# Patient Record
Sex: MALE | Race: WHITE | NOT HISPANIC OR LATINO | Employment: OTHER | URBAN - METROPOLITAN AREA
[De-identification: names, ages, dates, MRNs, and addresses within clinical notes are randomized per-mention and may not be internally consistent; named-entity substitution may affect disease eponyms.]

---

## 2023-02-06 ENCOUNTER — HOSPITAL ENCOUNTER (EMERGENCY)
Dept: DATA CONVERSION | Facility: HOSPITAL | Age: 88
Discharge: HOME | End: 2023-02-06
Attending: STUDENT IN AN ORGANIZED HEALTH CARE EDUCATION/TRAINING PROGRAM

## 2023-02-06 DIAGNOSIS — E78.5 HYPERLIPIDEMIA, UNSPECIFIED: ICD-10-CM

## 2023-02-06 DIAGNOSIS — R42 DIZZINESS AND GIDDINESS: ICD-10-CM
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
## 2023-02-06 DIAGNOSIS — M25.511 PAIN IN RIGHT SHOULDER: ICD-10-CM

## 2023-02-06 DIAGNOSIS — R51.9 HEADACHE, UNSPECIFIED: ICD-10-CM

## 2023-02-06 DIAGNOSIS — G89.29 OTHER CHRONIC PAIN: ICD-10-CM

## 2023-02-06 DIAGNOSIS — R94.31 ABNORMAL ELECTROCARDIOGRAM (ECG) (EKG): ICD-10-CM

## 2023-02-06 DIAGNOSIS — N40.0 BENIGN PROSTATIC HYPERPLASIA WITHOUT LOWER URINARY TRACT SYMPTOMS: ICD-10-CM

## 2023-02-06 DIAGNOSIS — R55 SYNCOPE AND COLLAPSE: ICD-10-CM

## 2023-02-06 DIAGNOSIS — M48.02 SPINAL STENOSIS, CERVICAL REGION: ICD-10-CM

## 2023-02-06 DIAGNOSIS — F41.9 ANXIETY DISORDER, UNSPECIFIED: ICD-10-CM

## 2023-02-06 DIAGNOSIS — I10 ESSENTIAL (PRIMARY) HYPERTENSION: ICD-10-CM

## 2023-02-06 DIAGNOSIS — F32.A DEPRESSION, UNSPECIFIED: ICD-10-CM

## 2023-02-06 LAB
BASOPHILS (10*3/UL) IN BLOOD BY AUTOMATED COUNT: 0.02 X10E9/L (ref 0–0.1)
BASOPHILS/100 LEUKOCYTES IN BLOOD BY AUTOMATED COUNT: 0.6 % (ref 0–2)
EOSINOPHILS (10*3/UL) IN BLOOD BY AUTOMATED COUNT: 0.1 X10E9/L (ref 0–0.4)
EOSINOPHILS/100 LEUKOCYTES IN BLOOD BY AUTOMATED COUNT: 3 % (ref 0–6)
ERYTHROCYTE DISTRIBUTION WIDTH (RATIO) BY AUTOMATED COUNT: 13.1 % (ref 11.5–14.5)
ERYTHROCYTE MEAN CORPUSCULAR HEMOGLOBIN CONCENTRATION (G/DL) BY AUTOMATED: 32.9 G/DL (ref 32–36)
ERYTHROCYTE MEAN CORPUSCULAR VOLUME (FL) BY AUTOMATED COUNT: 95 FL (ref 80–100)
ERYTHROCYTES (10*6/UL) IN BLOOD BY AUTOMATED COUNT: 3.83 X10E12/L (ref 4.5–5.9)
HEMATOCRIT (%) IN BLOOD BY AUTOMATED COUNT: 36.5 % (ref 41–52)
HEMOGLOBIN (G/DL) IN BLOOD: 12 G/DL (ref 13.5–17.5)
IMMATURE GRANULOCYTES/100 LEUKOCYTES IN BLOOD BY AUTOMATED COUNT: 0.3 % (ref 0–0.9)
LEUKOCYTES (10*3/UL) IN BLOOD BY AUTOMATED COUNT: 3.3 X10E9/L (ref 4.4–11.3)
LYMPHOCYTES (10*3/UL) IN BLOOD BY AUTOMATED COUNT: 0.89 X10E9/L (ref 0.8–3)
LYMPHOCYTES/100 LEUKOCYTES IN BLOOD BY AUTOMATED COUNT: 26.9 % (ref 13–44)
MONOCYTES (10*3/UL) IN BLOOD BY AUTOMATED COUNT: 0.35 X10E9/L (ref 0.05–0.8)
MONOCYTES/100 LEUKOCYTES IN BLOOD BY AUTOMATED COUNT: 10.6 % (ref 2–10)
NEUTROPHILS (10*3/UL) IN BLOOD BY AUTOMATED COUNT: 1.94 X10E9/L (ref 1.6–5.5)
NEUTROPHILS/100 LEUKOCYTES IN BLOOD BY AUTOMATED COUNT: 58.6 % (ref 40–80)
NRBC (PER 100 WBCS) BY AUTOMATED COUNT: 0 /100 WBC (ref 0–0)
PLATELETS (10*3/UL) IN BLOOD AUTOMATED COUNT: 144 X10E9/L (ref 150–450)
TROPONIN I, HIGH SENSITIVITY: 6 NG/L (ref 0–20)

## 2023-04-26 LAB
ALANINE AMINOTRANSFERASE (SGPT) (U/L) IN SER/PLAS: 12 U/L (ref 10–52)
ALBUMIN (G/DL) IN SER/PLAS: 4.1 G/DL (ref 3.4–5)
ALKALINE PHOSPHATASE (U/L) IN SER/PLAS: 44 U/L (ref 33–136)
ANION GAP IN SER/PLAS: 11 MMOL/L (ref 10–20)
ASPARTATE AMINOTRANSFERASE (SGOT) (U/L) IN SER/PLAS: 14 U/L (ref 9–39)
BASOPHILS (10*3/UL) IN BLOOD BY AUTOMATED COUNT: 0.03 X10E9/L (ref 0–0.1)
BASOPHILS/100 LEUKOCYTES IN BLOOD BY AUTOMATED COUNT: 0.9 % (ref 0–2)
BILIRUBIN DIRECT (MG/DL) IN SER/PLAS: 0.1 MG/DL (ref 0–0.3)
BILIRUBIN TOTAL (MG/DL) IN SER/PLAS: 1.5 MG/DL (ref 0–1.2)
CALCIDIOL (25 OH VITAMIN D3) (NG/ML) IN SER/PLAS: 53 NG/ML
CALCIUM (MG/DL) IN SER/PLAS: 9.4 MG/DL (ref 8.6–10.3)
CARBON DIOXIDE, TOTAL (MMOL/L) IN SER/PLAS: 31 MMOL/L (ref 21–32)
CHLORIDE (MMOL/L) IN SER/PLAS: 106 MMOL/L (ref 98–107)
CHOLESTEROL (MG/DL) IN SER/PLAS: 141 MG/DL (ref 0–199)
CHOLESTEROL IN HDL (MG/DL) IN SER/PLAS: 71.7 MG/DL
CHOLESTEROL/HDL RATIO: 2
COBALAMIN (VITAMIN B12) (PG/ML) IN SER/PLAS: 544 PG/ML (ref 211–911)
CREATININE (MG/DL) IN SER/PLAS: 0.8 MG/DL (ref 0.5–1.3)
EOSINOPHILS (10*3/UL) IN BLOOD BY AUTOMATED COUNT: 0.09 X10E9/L (ref 0–0.4)
EOSINOPHILS/100 LEUKOCYTES IN BLOOD BY AUTOMATED COUNT: 2.6 % (ref 0–6)
ERYTHROCYTE DISTRIBUTION WIDTH (RATIO) BY AUTOMATED COUNT: 12.7 % (ref 11.5–14.5)
ERYTHROCYTE MEAN CORPUSCULAR HEMOGLOBIN CONCENTRATION (G/DL) BY AUTOMATED: 32 G/DL (ref 32–36)
ERYTHROCYTE MEAN CORPUSCULAR VOLUME (FL) BY AUTOMATED COUNT: 98 FL (ref 80–100)
ERYTHROCYTES (10*6/UL) IN BLOOD BY AUTOMATED COUNT: 4.2 X10E12/L (ref 4.5–5.9)
GFR MALE: 86 ML/MIN/1.73M2
GLUCOSE (MG/DL) IN SER/PLAS: 88 MG/DL (ref 74–99)
HEMATOCRIT (%) IN BLOOD BY AUTOMATED COUNT: 41.3 % (ref 41–52)
HEMOGLOBIN (G/DL) IN BLOOD: 13.2 G/DL (ref 13.5–17.5)
IMMATURE GRANULOCYTES/100 LEUKOCYTES IN BLOOD BY AUTOMATED COUNT: 0 % (ref 0–0.9)
IRON (UG/DL) IN SER/PLAS: 100 UG/DL (ref 35–150)
IRON BINDING CAPACITY (UG/DL) IN SER/PLAS: 247 UG/DL (ref 240–445)
IRON SATURATION (%) IN SER/PLAS: 40 % (ref 25–45)
LDL: 61 MG/DL (ref 0–99)
LEUKOCYTES (10*3/UL) IN BLOOD BY AUTOMATED COUNT: 3.5 X10E9/L (ref 4.4–11.3)
LYMPHOCYTES (10*3/UL) IN BLOOD BY AUTOMATED COUNT: 0.95 X10E9/L (ref 0.8–3)
LYMPHOCYTES/100 LEUKOCYTES IN BLOOD BY AUTOMATED COUNT: 27.5 % (ref 13–44)
MONOCYTES (10*3/UL) IN BLOOD BY AUTOMATED COUNT: 0.34 X10E9/L (ref 0.05–0.8)
MONOCYTES/100 LEUKOCYTES IN BLOOD BY AUTOMATED COUNT: 9.8 % (ref 2–10)
NEUTROPHILS (10*3/UL) IN BLOOD BY AUTOMATED COUNT: 2.05 X10E9/L (ref 1.6–5.5)
NEUTROPHILS/100 LEUKOCYTES IN BLOOD BY AUTOMATED COUNT: 59.2 % (ref 40–80)
NRBC (PER 100 WBCS) BY AUTOMATED COUNT: 0 /100 WBC (ref 0–0)
PLATELETS (10*3/UL) IN BLOOD AUTOMATED COUNT: 152 X10E9/L (ref 150–450)
POTASSIUM (MMOL/L) IN SER/PLAS: 4.2 MMOL/L (ref 3.5–5.3)
PROTEIN TOTAL: 6.9 G/DL (ref 6.4–8.2)
SODIUM (MMOL/L) IN SER/PLAS: 144 MMOL/L (ref 136–145)
THYROTROPIN (MIU/L) IN SER/PLAS BY DETECTION LIMIT <= 0.05 MIU/L: 2.46 MIU/L (ref 0.44–3.98)
TRIGLYCERIDE (MG/DL) IN SER/PLAS: 42 MG/DL (ref 0–149)
UREA NITROGEN (MG/DL) IN SER/PLAS: 29 MG/DL (ref 6–23)
VLDL: 8 MG/DL (ref 0–40)

## 2023-09-12 LAB — PROSTATE SPECIFIC AG (NG/ML) IN SER/PLAS: 1.49 NG/ML (ref 0–4)

## 2023-12-18 ENCOUNTER — HOSPITAL ENCOUNTER (INPATIENT)
Facility: HOSPITAL | Age: 88
LOS: 4 days | Discharge: NURSING, REHAB OR LONG-TERM CARE | DRG: 535 | End: 2023-12-22
Attending: EMERGENCY MEDICINE | Admitting: INTERNAL MEDICINE
Payer: MEDICARE

## 2023-12-18 ENCOUNTER — APPOINTMENT (OUTPATIENT)
Dept: RADIOLOGY | Facility: HOSPITAL | Age: 88
DRG: 535 | End: 2023-12-18
Payer: MEDICARE

## 2023-12-18 DIAGNOSIS — S09.90XA INJURY OF HEAD, INITIAL ENCOUNTER: ICD-10-CM

## 2023-12-18 DIAGNOSIS — S32.592A: ICD-10-CM
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
## 2023-12-18 LAB
ABO GROUP (TYPE) IN BLOOD: NORMAL
ALBUMIN SERPL BCP-MCNC: 4.1 G/DL (ref 3.4–5)
ALP SERPL-CCNC: 61 U/L (ref 33–136)
ALT SERPL W P-5'-P-CCNC: 14 U/L (ref 10–52)
ANION GAP SERPL CALC-SCNC: 9 MMOL/L (ref 10–20)
ANTIBODY SCREEN: NORMAL
AST SERPL W P-5'-P-CCNC: 19 U/L (ref 9–39)
BASOPHILS # BLD AUTO: 0.03 X10*3/UL (ref 0–0.1)
BASOPHILS NFR BLD AUTO: 0.7 %
BILIRUB SERPL-MCNC: 1.1 MG/DL (ref 0–1.2)
BUN SERPL-MCNC: 20 MG/DL (ref 6–23)
CALCIUM SERPL-MCNC: 9.1 MG/DL (ref 8.6–10.3)
CHLORIDE SERPL-SCNC: 103 MMOL/L (ref 98–107)
CHOLEST SERPL-MCNC: 150 MG/DL (ref 0–199)
CHOLESTEROL/HDL RATIO: 2
CK SERPL-CCNC: 89 U/L (ref 0–325)
CO2 SERPL-SCNC: 32 MMOL/L (ref 21–32)
CREAT SERPL-MCNC: 0.83 MG/DL (ref 0.5–1.3)
EOSINOPHIL # BLD AUTO: 0.11 X10*3/UL (ref 0–0.4)
EOSINOPHIL NFR BLD AUTO: 2.7 %
ERYTHROCYTE [DISTWIDTH] IN BLOOD BY AUTOMATED COUNT: 13.6 % (ref 11.5–14.5)
GFR SERPL CREATININE-BSD FRML MDRD: 85 ML/MIN/1.73M*2
GLUCOSE BLD MANUAL STRIP-MCNC: 91 MG/DL (ref 74–99)
GLUCOSE SERPL-MCNC: 90 MG/DL (ref 74–99)
HCT VFR BLD AUTO: 37.7 % (ref 41–52)
HDLC SERPL-MCNC: 74.1 MG/DL
HGB BLD-MCNC: 12.6 G/DL (ref 13.5–17.5)
IMM GRANULOCYTES # BLD AUTO: 0.03 X10*3/UL (ref 0–0.5)
IMM GRANULOCYTES NFR BLD AUTO: 0.7 % (ref 0–0.9)
INR PPP: 1.2 (ref 0.9–1.1)
LACTATE SERPL-SCNC: 1.9 MMOL/L (ref 0.4–2)
LDLC SERPL CALC-MCNC: 48 MG/DL
LYMPHOCYTES # BLD AUTO: 1.35 X10*3/UL (ref 0.8–3)
LYMPHOCYTES NFR BLD AUTO: 33.3 %
MCH RBC QN AUTO: 32.6 PG (ref 26–34)
MCHC RBC AUTO-ENTMCNC: 33.4 G/DL (ref 32–36)
MCV RBC AUTO: 98 FL (ref 80–100)
MONOCYTES # BLD AUTO: 0.47 X10*3/UL (ref 0.05–0.8)
MONOCYTES NFR BLD AUTO: 11.6 %
NEUTROPHILS # BLD AUTO: 2.07 X10*3/UL (ref 1.6–5.5)
NEUTROPHILS NFR BLD AUTO: 51 %
NON HDL CHOLESTEROL: 76 MG/DL (ref 0–149)
NRBC BLD-RTO: 0 /100 WBCS (ref 0–0)
PLATELET # BLD AUTO: 147 X10*3/UL (ref 150–450)
POTASSIUM SERPL-SCNC: 3.7 MMOL/L (ref 3.5–5.3)
PROT SERPL-MCNC: 7 G/DL (ref 6.4–8.2)
PROTHROMBIN TIME: 13.1 SECONDS (ref 9.8–12.8)
RBC # BLD AUTO: 3.86 X10*6/UL (ref 4.5–5.9)
RH FACTOR (ANTIGEN D): NORMAL
SARS-COV-2 RNA RESP QL NAA+PROBE: DETECTED
SODIUM SERPL-SCNC: 140 MMOL/L (ref 136–145)
TRIGL SERPL-MCNC: 141 MG/DL (ref 0–149)
VLDL: 28 MG/DL (ref 0–40)
WBC # BLD AUTO: 4.1 X10*3/UL (ref 4.4–11.3)

## 2023-12-18 PROCEDURE — 86900 BLOOD TYPING SEROLOGIC ABO: CPT | Performed by: EMERGENCY MEDICINE

## 2023-12-18 PROCEDURE — G0390 TRAUMA RESPONS W/HOSP CRITI: HCPCS

## 2023-12-18 PROCEDURE — 72131 CT LUMBAR SPINE W/O DYE: CPT

## 2023-12-18 PROCEDURE — 99222 1ST HOSP IP/OBS MODERATE 55: CPT

## 2023-12-18 PROCEDURE — 72125 CT NECK SPINE W/O DYE: CPT

## 2023-12-18 PROCEDURE — 80053 COMPREHEN METABOLIC PANEL: CPT | Performed by: EMERGENCY MEDICINE

## 2023-12-18 PROCEDURE — 82947 ASSAY GLUCOSE BLOOD QUANT: CPT

## 2023-12-18 PROCEDURE — 72125 CT NECK SPINE W/O DYE: CPT | Performed by: RADIOLOGY

## 2023-12-18 PROCEDURE — 72128 CT CHEST SPINE W/O DYE: CPT

## 2023-12-18 PROCEDURE — 99222 1ST HOSP IP/OBS MODERATE 55: CPT | Performed by: SURGERY

## 2023-12-18 PROCEDURE — 83605 ASSAY OF LACTIC ACID: CPT | Performed by: EMERGENCY MEDICINE

## 2023-12-18 PROCEDURE — 36415 COLL VENOUS BLD VENIPUNCTURE: CPT | Performed by: EMERGENCY MEDICINE

## 2023-12-18 PROCEDURE — 82550 ASSAY OF CK (CPK): CPT | Performed by: EMERGENCY MEDICINE

## 2023-12-18 PROCEDURE — 99223 1ST HOSP IP/OBS HIGH 75: CPT | Performed by: INTERNAL MEDICINE

## 2023-12-18 PROCEDURE — 80061 LIPID PANEL: CPT | Performed by: INTERNAL MEDICINE

## 2023-12-18 PROCEDURE — 71260 CT THORAX DX C+: CPT

## 2023-12-18 PROCEDURE — 85610 PROTHROMBIN TIME: CPT | Performed by: EMERGENCY MEDICINE

## 2023-12-18 PROCEDURE — 70486 CT MAXILLOFACIAL W/O DYE: CPT

## 2023-12-18 PROCEDURE — 85025 COMPLETE CBC W/AUTO DIFF WBC: CPT | Performed by: EMERGENCY MEDICINE

## 2023-12-18 PROCEDURE — 99291 CRITICAL CARE FIRST HOUR: CPT | Performed by: PHYSICIAN ASSISTANT

## 2023-12-18 PROCEDURE — 1200000002 HC GENERAL ROOM WITH TELEMETRY DAILY

## 2023-12-18 PROCEDURE — 71260 CT THORAX DX C+: CPT | Performed by: RADIOLOGY

## 2023-12-18 PROCEDURE — 72128 CT CHEST SPINE W/O DYE: CPT | Performed by: RADIOLOGY

## 2023-12-18 PROCEDURE — 74177 CT ABD & PELVIS W/CONTRAST: CPT | Performed by: RADIOLOGY

## 2023-12-18 PROCEDURE — 70450 CT HEAD/BRAIN W/O DYE: CPT

## 2023-12-18 PROCEDURE — 87635 SARS-COV-2 COVID-19 AMP PRB: CPT | Performed by: PHYSICIAN ASSISTANT

## 2023-12-18 PROCEDURE — 72131 CT LUMBAR SPINE W/O DYE: CPT | Performed by: RADIOLOGY

## 2023-12-18 PROCEDURE — 2550000001 HC RX 255 CONTRASTS: Performed by: EMERGENCY MEDICINE

## 2023-12-18 RX ORDER — MORPHINE SULFATE 2 MG/ML
2 INJECTION, SOLUTION INTRAMUSCULAR; INTRAVENOUS EVERY 4 HOURS PRN
Status: DISCONTINUED | OUTPATIENT
Start: 2023-12-18 | End: 2023-12-20

## 2023-12-18 RX ORDER — ACETAMINOPHEN 325 MG/1
975 TABLET ORAL EVERY 8 HOURS
Status: DISCONTINUED | OUTPATIENT
Start: 2023-12-18 | End: 2023-12-23 | Stop reason: HOSPADM

## 2023-12-18 RX ORDER — LANOLIN ALCOHOL/MO/W.PET/CERES
400 CREAM (GRAM) TOPICAL DAILY
Status: DISCONTINUED | OUTPATIENT
Start: 2023-12-19 | End: 2023-12-23 | Stop reason: HOSPADM

## 2023-12-18 RX ORDER — LIDOCAINE 50 MG/G
1 PATCH TOPICAL DAILY
Status: DISCONTINUED | OUTPATIENT
Start: 2023-12-19 | End: 2023-12-19

## 2023-12-18 RX ORDER — GABAPENTIN 300 MG/1
300 CAPSULE ORAL 3 TIMES DAILY
Status: DISCONTINUED | OUTPATIENT
Start: 2023-12-18 | End: 2023-12-23 | Stop reason: HOSPADM

## 2023-12-18 RX ORDER — MORPHINE SULFATE 4 MG/ML
4 INJECTION, SOLUTION INTRAMUSCULAR; INTRAVENOUS EVERY 4 HOURS PRN
Status: DISCONTINUED | OUTPATIENT
Start: 2023-12-18 | End: 2023-12-20

## 2023-12-18 RX ADMIN — IOHEXOL 70 ML: 350 INJECTION, SOLUTION INTRAVENOUS at 18:24

## 2023-12-18 ASSESSMENT — COLUMBIA-SUICIDE SEVERITY RATING SCALE - C-SSRS
2. HAVE YOU ACTUALLY HAD ANY THOUGHTS OF KILLING YOURSELF?: NO
6. HAVE YOU EVER DONE ANYTHING, STARTED TO DO ANYTHING, OR PREPARED TO DO ANYTHING TO END YOUR LIFE?: NO
1. SINCE LAST CONTACT, HAVE YOU WISHED YOU WERE DEAD OR WISHED YOU COULD GO TO SLEEP AND NOT WAKE UP?: NO
6. HAVE YOU EVER DONE ANYTHING, STARTED TO DO ANYTHING, OR PREPARED TO DO ANYTHING TO END YOUR LIFE?: NO
2. HAVE YOU ACTUALLY HAD ANY THOUGHTS OF KILLING YOURSELF?: NO
1. IN THE PAST MONTH, HAVE YOU WISHED YOU WERE DEAD OR WISHED YOU COULD GO TO SLEEP AND NOT WAKE UP?: NO

## 2023-12-18 ASSESSMENT — PAIN SCALES - GENERAL: PAINLEVEL_OUTOF10: 10 - WORST POSSIBLE PAIN

## 2023-12-18 ASSESSMENT — PAIN DESCRIPTION - PROGRESSION: CLINICAL_PROGRESSION: NOT CHANGED

## 2023-12-18 ASSESSMENT — LIFESTYLE VARIABLES
EVER FELT BAD OR GUILTY ABOUT YOUR DRINKING: NO
HAVE PEOPLE ANNOYED YOU BY CRITICIZING YOUR DRINKING: NO
EVER HAD A DRINK FIRST THING IN THE MORNING TO STEADY YOUR NERVES TO GET RID OF A HANGOVER: NO
HAVE YOU EVER FELT YOU SHOULD CUT DOWN ON YOUR DRINKING: NO

## 2023-12-18 ASSESSMENT — PAIN - FUNCTIONAL ASSESSMENT: PAIN_FUNCTIONAL_ASSESSMENT: 0-10

## 2023-12-18 NOTE — ED PROVIDER NOTES
HPI   No chief complaint on file.      88-year-old male presents today for injury secondary to a fall.  History of present illness is somewhat limited.  Patient reportedly fell on 2 separate occasions today with the most recent being outside.  The patient was found on the ground outside by EMS.  It is not known how long the patient was on the ground for.  Patient was found to have an abrasion like lesion over the left eyebrow.  Patient who is alert and oriented could not provide information of what transpired.  Endorses no use of anticoagulants.  Reports pain throughout his head neck and back.  No reports of chest pain or shortness of breath.  Denies vomiting.                          No data recorded                Patient History   Past Medical History:   Diagnosis Date    Personal history of malignant neoplasm, unspecified     History of malignant neoplasm    Personal history of other diseases of the circulatory system     History of hypertension    Personal history of other diseases of the digestive system     History of diverticulitis    Personal history of other diseases of the digestive system     History of pancreatitis    Personal history of other diseases of the musculoskeletal system and connective tissue     History of arthritis    Personal history of other diseases of the musculoskeletal system and connective tissue     History of back pain    Personal history of other specified conditions     History of abdominal pain    Personal history of other specified conditions     History of headache     Past Surgical History:   Procedure Laterality Date    CATARACT EXTRACTION  09/11/2017    Cataract Surgery    CHOLECYSTECTOMY  09/11/2017    Cholecystectomy    COLONOSCOPY  09/11/2017    Colonoscopy (Fiberoptic)    OTHER SURGICAL HISTORY  09/11/2017    Biopsy Skin     No family history on file.  Social History     Tobacco Use    Smoking status: Not on file    Smokeless tobacco: Not on file   Substance Use Topics     Alcohol use: Not on file    Drug use: Not on file       Physical Exam   ED Triage Vitals   Temp Pulse Resp BP   -- -- -- --      SpO2 Temp src Heart Rate Source Patient Position   -- -- -- --      BP Location FiO2 (%)     -- --       Physical Exam  Vitals and nursing note reviewed.   Constitutional:       General: He is not in acute distress.     Appearance: Normal appearance. He is normal weight. He is not ill-appearing, toxic-appearing or diaphoretic.      Comments: GCS of 15   HENT:      Head: Normocephalic.      Comments: No evidence of basilar skull fracture or midface instability     Nose: Nose normal.      Mouth/Throat:      Mouth: Mucous membranes are moist.   Eyes:      Extraocular Movements: Extraocular movements intact.      Conjunctiva/sclera: Conjunctivae normal.      Comments: No entrapment   Neck:      Comments: There is no direct bony tenderness on examination of the midline cervical thoracic or lumbar spine.  No crepitus or step-off.  Patient does have tenderness about the paraspinous planes throughout the upper portion of the thoracic spine extending into the lumbar spine  Cardiovascular:      Rate and Rhythm: Normal rate and regular rhythm.      Pulses: Normal pulses.   Pulmonary:      Effort: Pulmonary effort is normal. No respiratory distress.      Breath sounds: Normal breath sounds.   Chest:      Chest wall: No tenderness.   Abdominal:      General: Abdomen is flat. Bowel sounds are normal. There is no distension.      Palpations: Abdomen is soft.      Tenderness: There is no abdominal tenderness. There is no guarding or rebound.   Musculoskeletal:         General: Normal range of motion.      Cervical back: Normal range of motion and neck supple.      Comments: No direct bony tenderness on examination of the extremities.  The patient is neurovascularly intact throughout.  He has full range of motion.  No pelvic instability on exam.  There is no obvious shortening or rotation of the  bilateral lower extremities.  There is no increased pain with logrolling of the bilateral hips   Skin:     General: Skin is warm and dry.      Capillary Refill: Capillary refill takes less than 2 seconds.      Comments: Abrasion to the left supraorbital region   Neurological:      General: No focal deficit present.      Mental Status: He is alert and oriented to person, place, and time.   Psychiatric:         Mood and Affect: Mood normal.         Behavior: Behavior normal.         Thought Content: Thought content normal.         Judgment: Judgment normal.         ED Course & MDM   ED Course as of 12/18/23 1928   Mon Dec 18, 2023   1746 EKG obtained.  Interpreted by me.  Sinus bradycardia.  Rate of 58.  Mild inferior depression, unchanged from prior.  No acute ischemia. [MK]   1823 Blood work is found to be consistent with previous [DS]   1824 Creatine Kinase: 89 [DS]   1824 Lactate: 1.9 [DS]   1923 IMPRESSION:  CHEST:  1.  No CT evidence of acute intrathoracic injury.  2. No CT evidence of acute thoracic spine injury.      ABDOMEN-PELVIS:  1.  Acute, comminuted fractures of the left superior inferior pubic  ramus with associated space of right sees hematoma along the left.  There is a small region of density as described above that increases  in conspicuity between the arterial and portal venous phase imaging  compatible with a small focus of active bleeding within the region  immediately superior to the left superior pubic ramus.  2. No acute osseous abnormality detected of the lumbar spine.  3. No CT evidence of acute solid organ injury.   [DS]   1923 IMPRESSION:  No CT evidence of acute intracranial injury.      No CT evidence of acute displaced facial bone fracture.   [DS]   1923 IMPRESSION:  Multilevel spondylosis without acute osseous abnormality of the  cervical spine.   [DS]   1927 There is a comminuted fracture of the left superior inferior pubic  rami. There is associated left-sided space of Retzius  hematoma with a  small region of density (series 602, image 44) on the arterial phase  exam which increases in density and conspicuity on the portal venous  phase delay (series 702 image 44) suggestive of a small focus of  active extravasation/active bleeding. This measures up to 0.6 x 0.3  cm on the arterial phase imaging and 1.4 x 0.5 cm on the portal  venous phase imaging. .   [DS]      ED Course User Index  [DS] Axel Leigh PA-C  [MK] Hilario Valle MD         Diagnoses as of 12/18/23 1928   Fall, initial encounter   Injury of head, initial encounter   Closed fracture of multiple rami of left pubis, initial encounter (CMS/[facility])       Medical Decision Making  Trauma activation was called on arrival.  History is somewhat limited however the patient reportedly fell on 2 separate occasions today.  He was found outside by EMS.  Is not known how long the patient was outside for.  I reviewed the patient's electronic medical records which revealed a past history of TIA, B12 deficiency, cervical spinal stenosis, and hyperlipidemia.  Blood work will be obtained.  Extensive imaging was ordered.  Blood work sent to be consistent with previous.  CK 89 with a lactate of 1.9.  The remainder of the lab work was found to be without concerning abnormality.  CT imaging of the head and cervical spine was ordered and found to be negative for acute traumatic injury.  CT T and L-spine were also found to be negative.  CT imaging of the chest abdomen pelvis with IV contrast revealed comminuted fracture of the left superior inferior pubic rami.  There is associated left-sided space of Retzius hematoma with a small region of density (series 602, image 44) on the arterial phase exam which increases in density and conspicuity on the portal venous phase delay (series 702 image 44) suggestive of a small focus of active extravasation/active bleeding. This measures up to 0.6 x 0.3 cm on the arterial phase imaging and 1.4 x 0.5 cm on the  portal venous phase imaging.  Case was discussed with Dr. Romero trauma service on-call who recommended admitting to the medicine service.  Case was also discussed with Dr. Orozco orthopedics on-call.  He was agreeable to the consultation.  Patient be admitted to the medicine service for further evaluation.  I suspect the patient most likely will require PT OT evaluation as well as placement.          Procedure  Critical Care    Performed by: Axel Leigh PA-C  Authorized by: Hilario Valle MD    Critical care provider statement:     Critical care time (minutes):  80    Critical care start time:  12/18/2023 6:00 PM    Critical care end time:  12/18/2023 7:20 PM    Critical care was necessary to treat or prevent imminent or life-threatening deterioration of the following conditions:  Trauma    Critical care was time spent personally by me on the following activities:  Ordering and performing treatments and interventions, ordering and review of laboratory studies, ordering and review of radiographic studies, re-evaluation of patient's condition, pulse oximetry, review of old charts, examination of patient and discussions with consultants    I assumed direction of critical care for this patient from another provider in my specialty: no      Care discussed with: admitting provider         Axel Leigh PA-C  12/18/23 1951

## 2023-12-18 NOTE — ED TRIAGE NOTES
PT BIBA FROM HOME AFTER FALLING IN [place]. PER EMS PT WAS FOUND ON GROUND NEAR MAILBOX IN SNOW. PT HIT HEAD. DENIES LOC. DENIES THINNERS. ON ARRIVAL TO ED, PT COMPLAINS OF NECK, HEAD, BACK, AND LEFT LEG PAIN. PT HAS LACERATION ABOVE L EYE ON EYEBROW. PT A&O X2 ON ARRIVAL WITH SOME GARBLED SPEECH AND INABILITY TO FIND CORRECT WORDS. PT UNABLE TO FOLLOW COMMANDS.

## 2023-12-19 ENCOUNTER — APPOINTMENT (OUTPATIENT)
Dept: RADIOLOGY | Facility: HOSPITAL | Age: 88
DRG: 535 | End: 2023-12-19
Payer: MEDICARE

## 2023-12-19 LAB
ANION GAP SERPL CALC-SCNC: 8 MMOL/L (ref 10–20)
BUN SERPL-MCNC: 17 MG/DL (ref 6–23)
CALCIUM SERPL-MCNC: 8.7 MG/DL (ref 8.6–10.3)
CHLORIDE SERPL-SCNC: 102 MMOL/L (ref 98–107)
CO2 SERPL-SCNC: 33 MMOL/L (ref 21–32)
CREAT SERPL-MCNC: 0.66 MG/DL (ref 0.5–1.3)
ERYTHROCYTE [DISTWIDTH] IN BLOOD BY AUTOMATED COUNT: 13.4 % (ref 11.5–14.5)
GFR SERPL CREATININE-BSD FRML MDRD: >90 ML/MIN/1.73M*2
GLUCOSE SERPL-MCNC: 99 MG/DL (ref 74–99)
HCT VFR BLD AUTO: 35.8 % (ref 41–52)
HCT VFR BLD AUTO: 36.4 % (ref 41–52)
HGB BLD-MCNC: 12.1 G/DL (ref 13.5–17.5)
HGB BLD-MCNC: 12.2 G/DL (ref 13.5–17.5)
MAGNESIUM SERPL-MCNC: 1.94 MG/DL (ref 1.6–2.4)
MCH RBC QN AUTO: 33.1 PG (ref 26–34)
MCHC RBC AUTO-ENTMCNC: 33.8 G/DL (ref 32–36)
MCV RBC AUTO: 98 FL (ref 80–100)
NRBC BLD-RTO: 0 /100 WBCS (ref 0–0)
PLATELET # BLD AUTO: 119 X10*3/UL (ref 150–450)
POTASSIUM SERPL-SCNC: 4.2 MMOL/L (ref 3.5–5.3)
RBC # BLD AUTO: 3.66 X10*6/UL (ref 4.5–5.9)
SODIUM SERPL-SCNC: 139 MMOL/L (ref 136–145)
WBC # BLD AUTO: 5.1 X10*3/UL (ref 4.4–11.3)

## 2023-12-19 PROCEDURE — 70544 MR ANGIOGRAPHY HEAD W/O DYE: CPT | Performed by: RADIOLOGY

## 2023-12-19 PROCEDURE — 70547 MR ANGIOGRAPHY NECK W/O DYE: CPT | Performed by: RADIOLOGY

## 2023-12-19 PROCEDURE — 2500000001 HC RX 250 WO HCPCS SELF ADMINISTERED DRUGS (ALT 637 FOR MEDICARE OP)

## 2023-12-19 PROCEDURE — 36415 COLL VENOUS BLD VENIPUNCTURE: CPT

## 2023-12-19 PROCEDURE — 80048 BASIC METABOLIC PNL TOTAL CA: CPT

## 2023-12-19 PROCEDURE — 70551 MRI BRAIN STEM W/O DYE: CPT

## 2023-12-19 PROCEDURE — 85027 COMPLETE CBC AUTOMATED: CPT

## 2023-12-19 PROCEDURE — 99232 SBSQ HOSP IP/OBS MODERATE 35: CPT | Performed by: SURGERY

## 2023-12-19 PROCEDURE — 2500000004 HC RX 250 GENERAL PHARMACY W/ HCPCS (ALT 636 FOR OP/ED): Performed by: INTERNAL MEDICINE

## 2023-12-19 PROCEDURE — 70547 MR ANGIOGRAPHY NECK W/O DYE: CPT

## 2023-12-19 PROCEDURE — 99232 SBSQ HOSP IP/OBS MODERATE 35: CPT | Performed by: INTERNAL MEDICINE

## 2023-12-19 PROCEDURE — 72141 MRI NECK SPINE W/O DYE: CPT

## 2023-12-19 PROCEDURE — 85018 HEMOGLOBIN: CPT

## 2023-12-19 PROCEDURE — 2500000005 HC RX 250 GENERAL PHARMACY W/O HCPCS

## 2023-12-19 PROCEDURE — 72141 MRI NECK SPINE W/O DYE: CPT | Performed by: RADIOLOGY

## 2023-12-19 PROCEDURE — 99231 SBSQ HOSP IP/OBS SF/LOW 25: CPT | Performed by: NURSE PRACTITIONER

## 2023-12-19 PROCEDURE — 2500000004 HC RX 250 GENERAL PHARMACY W/ HCPCS (ALT 636 FOR OP/ED)

## 2023-12-19 PROCEDURE — 70551 MRI BRAIN STEM W/O DYE: CPT | Performed by: RADIOLOGY

## 2023-12-19 PROCEDURE — 1200000002 HC GENERAL ROOM WITH TELEMETRY DAILY

## 2023-12-19 PROCEDURE — 70544 MR ANGIOGRAPHY HEAD W/O DYE: CPT

## 2023-12-19 PROCEDURE — 2500000002 HC RX 250 W HCPCS SELF ADMINISTERED DRUGS (ALT 637 FOR MEDICARE OP, ALT 636 FOR OP/ED)

## 2023-12-19 PROCEDURE — 83735 ASSAY OF MAGNESIUM: CPT

## 2023-12-19 PROCEDURE — 99223 1ST HOSP IP/OBS HIGH 75: CPT | Performed by: PSYCHIATRY & NEUROLOGY

## 2023-12-19 RX ORDER — BIOTIN 10 MG
1 TABLET ORAL
COMMUNITY

## 2023-12-19 RX ORDER — FINASTERIDE 5 MG/1
5 TABLET, FILM COATED ORAL
Status: DISCONTINUED | OUTPATIENT
Start: 2023-12-19 | End: 2023-12-23 | Stop reason: HOSPADM

## 2023-12-19 RX ORDER — ATORVASTATIN CALCIUM 20 MG/1
20 TABLET, FILM COATED ORAL
Status: DISCONTINUED | OUTPATIENT
Start: 2023-12-19 | End: 2023-12-23 | Stop reason: HOSPADM

## 2023-12-19 RX ORDER — FINASTERIDE 5 MG/1
5 TABLET, FILM COATED ORAL
COMMUNITY

## 2023-12-19 RX ORDER — ATORVASTATIN CALCIUM 20 MG/1
20 TABLET, FILM COATED ORAL
COMMUNITY
Start: 2023-05-30

## 2023-12-19 RX ORDER — FLUTICASONE PROPIONATE 50 MCG
2 SPRAY, SUSPENSION (ML) NASAL DAILY
Status: DISCONTINUED | OUTPATIENT
Start: 2023-12-19 | End: 2023-12-23 | Stop reason: HOSPADM

## 2023-12-19 RX ORDER — SERTRALINE HYDROCHLORIDE 100 MG/1
100 TABLET, FILM COATED ORAL
COMMUNITY
Start: 2022-12-21 | End: 2023-12-21

## 2023-12-19 RX ORDER — MORPHINE SULFATE 4 MG/ML
4 INJECTION, SOLUTION INTRAMUSCULAR; INTRAVENOUS ONCE
Status: DISCONTINUED | OUTPATIENT
Start: 2023-12-19 | End: 2023-12-20

## 2023-12-19 RX ORDER — ASPIRIN 81 MG/1
81 TABLET ORAL
COMMUNITY

## 2023-12-19 RX ORDER — FLUNISOLIDE 0.25 MG/ML
2 SOLUTION NASAL
COMMUNITY
Start: 2022-09-26

## 2023-12-19 RX ORDER — ONDANSETRON HYDROCHLORIDE 2 MG/ML
4 INJECTION, SOLUTION INTRAVENOUS EVERY 6 HOURS PRN
Status: DISCONTINUED | OUTPATIENT
Start: 2023-12-19 | End: 2023-12-23 | Stop reason: HOSPADM

## 2023-12-19 RX ORDER — SERTRALINE HYDROCHLORIDE 100 MG/1
100 TABLET, FILM COATED ORAL
Status: DISCONTINUED | OUTPATIENT
Start: 2023-12-19 | End: 2023-12-23 | Stop reason: HOSPADM

## 2023-12-19 RX ORDER — AMLODIPINE BESYLATE 2.5 MG/1
1 TABLET ORAL DAILY
COMMUNITY
Start: 2023-08-02 | End: 2024-07-27

## 2023-12-19 RX ORDER — ASPIRIN 81 MG/1
81 TABLET ORAL
Status: DISCONTINUED | OUTPATIENT
Start: 2023-12-19 | End: 2023-12-23 | Stop reason: HOSPADM

## 2023-12-19 RX ORDER — PANTOPRAZOLE SODIUM 40 MG/1
40 TABLET, DELAYED RELEASE ORAL
Status: DISCONTINUED | OUTPATIENT
Start: 2023-12-20 | End: 2023-12-23 | Stop reason: HOSPADM

## 2023-12-19 RX ORDER — LIDOCAINE 560 MG/1
1 PATCH PERCUTANEOUS; TOPICAL; TRANSDERMAL DAILY
Status: DISCONTINUED | OUTPATIENT
Start: 2023-12-19 | End: 2023-12-23 | Stop reason: HOSPADM

## 2023-12-19 RX ORDER — AMLODIPINE BESYLATE 5 MG/1
2.5 TABLET ORAL DAILY
Status: DISCONTINUED | OUTPATIENT
Start: 2023-12-19 | End: 2023-12-23 | Stop reason: HOSPADM

## 2023-12-19 RX ORDER — LORAZEPAM 2 MG/ML
0.5 INJECTION INTRAMUSCULAR ONCE
Status: COMPLETED | OUTPATIENT
Start: 2023-12-19 | End: 2023-12-19

## 2023-12-19 RX ADMIN — MORPHINE SULFATE 2 MG: 2 INJECTION, SOLUTION INTRAMUSCULAR; INTRAVENOUS at 13:21

## 2023-12-19 RX ADMIN — AMLODIPINE BESYLATE 2.5 MG: 5 TABLET ORAL at 13:15

## 2023-12-19 RX ADMIN — ATORVASTATIN CALCIUM 20 MG: 40 TABLET, FILM COATED ORAL at 13:15

## 2023-12-19 RX ADMIN — GABAPENTIN 300 MG: 300 CAPSULE ORAL at 21:19

## 2023-12-19 RX ADMIN — MAGNESIUM OXIDE TAB 400 MG (241.3 MG ELEMENTAL MG) 400 MG: 400 (241.3 MG) TAB at 10:49

## 2023-12-19 RX ADMIN — ACETAMINOPHEN 975 MG: 325 TABLET ORAL at 21:19

## 2023-12-19 RX ADMIN — GABAPENTIN 300 MG: 300 CAPSULE ORAL at 10:49

## 2023-12-19 RX ADMIN — MORPHINE SULFATE 2 MG: 2 INJECTION, SOLUTION INTRAMUSCULAR; INTRAVENOUS at 00:09

## 2023-12-19 RX ADMIN — ONDANSETRON 4 MG: 2 INJECTION INTRAMUSCULAR; INTRAVENOUS at 03:05

## 2023-12-19 RX ADMIN — FINASTERIDE 5 MG: 5 TABLET, FILM COATED ORAL at 15:11

## 2023-12-19 RX ADMIN — ACETAMINOPHEN 975 MG: 325 TABLET ORAL at 07:00

## 2023-12-19 RX ADMIN — LORAZEPAM 0.5 MG: 2 INJECTION INTRAMUSCULAR; INTRAVENOUS at 21:12

## 2023-12-19 RX ADMIN — ASPIRIN 81 MG: 81 TABLET, COATED ORAL at 13:15

## 2023-12-19 RX ADMIN — LIDOCAINE 1 PATCH: 4 PATCH TOPICAL at 13:21

## 2023-12-19 RX ADMIN — FLUTICASONE PROPIONATE 2 SPRAY: 50 SPRAY, METERED NASAL at 15:12

## 2023-12-19 RX ADMIN — SERTRALINE HYDROCHLORIDE 100 MG: 100 TABLET ORAL at 13:15

## 2023-12-19 RX ADMIN — ACETAMINOPHEN 975 MG: 325 TABLET ORAL at 13:15

## 2023-12-19 RX ADMIN — ACETAMINOPHEN 975 MG: 325 TABLET ORAL at 00:09

## 2023-12-19 RX ADMIN — MORPHINE SULFATE 2 MG: 2 INJECTION, SOLUTION INTRAMUSCULAR; INTRAVENOUS at 13:15

## 2023-12-19 SDOH — SOCIAL STABILITY: SOCIAL INSECURITY: WERE YOU ABLE TO COMPLETE ALL THE BEHAVIORAL HEALTH SCREENINGS?: YES

## 2023-12-19 SDOH — SOCIAL STABILITY: SOCIAL INSECURITY: HAVE YOU HAD THOUGHTS OF HARMING ANYONE ELSE?: NO

## 2023-12-19 SDOH — SOCIAL STABILITY: SOCIAL INSECURITY: ARE YOU OR HAVE YOU BEEN THREATENED OR ABUSED PHYSICALLY, EMOTIONALLY, OR SEXUALLY BY ANYONE?: NO

## 2023-12-19 SDOH — SOCIAL STABILITY: SOCIAL INSECURITY: DOES ANYONE TRY TO KEEP YOU FROM HAVING/CONTACTING OTHER FRIENDS OR DOING THINGS OUTSIDE YOUR HOME?: NO

## 2023-12-19 SDOH — SOCIAL STABILITY: SOCIAL INSECURITY: ARE THERE ANY APPARENT SIGNS OF INJURIES/BEHAVIORS THAT COULD BE RELATED TO ABUSE/NEGLECT?: NO

## 2023-12-19 SDOH — SOCIAL STABILITY: SOCIAL INSECURITY: HAS ANYONE EVER THREATENED TO HURT YOUR FAMILY OR YOUR PETS?: NO

## 2023-12-19 SDOH — SOCIAL STABILITY: SOCIAL INSECURITY: ABUSE: ADULT

## 2023-12-19 SDOH — SOCIAL STABILITY: SOCIAL INSECURITY: DO YOU FEEL ANYONE HAS EXPLOITED OR TAKEN ADVANTAGE OF YOU FINANCIALLY OR OF YOUR PERSONAL PROPERTY?: NO

## 2023-12-19 SDOH — SOCIAL STABILITY: SOCIAL INSECURITY: DO YOU FEEL UNSAFE GOING BACK TO THE PLACE WHERE YOU ARE LIVING?: NO

## 2023-12-19 ASSESSMENT — PAIN SCALES - GENERAL
PAINLEVEL_OUTOF10: 8
PAINLEVEL_OUTOF10: 5 - MODERATE PAIN
PAINLEVEL_OUTOF10: 7
PAINLEVEL_OUTOF10: 10 - WORST POSSIBLE PAIN
PAINLEVEL_OUTOF10: 5 - MODERATE PAIN

## 2023-12-19 ASSESSMENT — PAIN DESCRIPTION - PROGRESSION: CLINICAL_PROGRESSION: GRADUALLY IMPROVING

## 2023-12-19 ASSESSMENT — LIFESTYLE VARIABLES
HOW MANY STANDARD DRINKS CONTAINING ALCOHOL DO YOU HAVE ON A TYPICAL DAY: PATIENT DOES NOT DRINK
PRESCIPTION_ABUSE_PAST_12_MONTHS: NO
AUDIT-C TOTAL SCORE: 0
SKIP TO QUESTIONS 9-10: 1
HOW OFTEN DO YOU HAVE 6 OR MORE DRINKS ON ONE OCCASION: NEVER
AUDIT-C TOTAL SCORE: 0
SUBSTANCE_ABUSE_PAST_12_MONTHS: NO
HOW OFTEN DO YOU HAVE A DRINK CONTAINING ALCOHOL: NEVER

## 2023-12-19 ASSESSMENT — ACTIVITIES OF DAILY LIVING (ADL)
HEARING - LEFT EAR: DIFFICULTY WITH NOISE
ADEQUATE_TO_COMPLETE_ADL: YES
WALKS IN HOME: NEEDS ASSISTANCE
LACK_OF_TRANSPORTATION: NO
FEEDING YOURSELF: INDEPENDENT
JUDGMENT_ADEQUATE_SAFELY_COMPLETE_DAILY_ACTIVITIES: YES
TOILETING: NEEDS ASSISTANCE
DRESSING YOURSELF: INDEPENDENT
BATHING: NEEDS ASSISTANCE
HEARING - RIGHT EAR: DIFFICULTY WITH NOISE
PATIENT'S MEMORY ADEQUATE TO SAFELY COMPLETE DAILY ACTIVITIES?: NO
GROOMING: INDEPENDENT

## 2023-12-19 ASSESSMENT — COGNITIVE AND FUNCTIONAL STATUS - GENERAL
MOVING FROM LYING ON BACK TO SITTING ON SIDE OF FLAT BED WITH BEDRAILS: A LOT
TURNING FROM BACK TO SIDE WHILE IN FLAT BAD: A LOT
WALKING IN HOSPITAL ROOM: A LOT
HELP NEEDED FOR BATHING: A LOT
DRESSING REGULAR UPPER BODY CLOTHING: A LOT
MOVING TO AND FROM BED TO CHAIR: A LOT
EATING MEALS: A LOT
TOILETING: A LITTLE
DAILY ACTIVITIY SCORE: 20
MOVING TO AND FROM BED TO CHAIR: A LOT
TOILETING: A LOT
MOBILITY SCORE: 13
PATIENT BASELINE BEDBOUND: NO
DRESSING REGULAR LOWER BODY CLOTHING: A LOT
TURNING FROM BACK TO SIDE WHILE IN FLAT BAD: A LOT
MOVING FROM LYING ON BACK TO SITTING ON SIDE OF FLAT BED WITH BEDRAILS: A LITTLE
STANDING UP FROM CHAIR USING ARMS: A LOT
WALKING IN HOSPITAL ROOM: A LOT
DAILY ACTIVITIY SCORE: 12
STANDING UP FROM CHAIR USING ARMS: A LOT
HELP NEEDED FOR BATHING: A LITTLE
DRESSING REGULAR LOWER BODY CLOTHING: A LITTLE
DRESSING REGULAR UPPER BODY CLOTHING: A LITTLE
PERSONAL GROOMING: A LOT
CLIMB 3 TO 5 STEPS WITH RAILING: A LOT
CLIMB 3 TO 5 STEPS WITH RAILING: A LOT
MOBILITY SCORE: 12

## 2023-12-19 ASSESSMENT — PATIENT HEALTH QUESTIONNAIRE - PHQ9
1. LITTLE INTEREST OR PLEASURE IN DOING THINGS: NOT AT ALL
SUM OF ALL RESPONSES TO PHQ9 QUESTIONS 1 & 2: 0
2. FEELING DOWN, DEPRESSED OR HOPELESS: NOT AT ALL

## 2023-12-19 ASSESSMENT — COLUMBIA-SUICIDE SEVERITY RATING SCALE - C-SSRS
2. HAVE YOU ACTUALLY HAD ANY THOUGHTS OF KILLING YOURSELF?: NO
6. HAVE YOU EVER DONE ANYTHING, STARTED TO DO ANYTHING, OR PREPARED TO DO ANYTHING TO END YOUR LIFE?: NO
1. IN THE PAST MONTH, HAVE YOU WISHED YOU WERE DEAD OR WISHED YOU COULD GO TO SLEEP AND NOT WAKE UP?: NO

## 2023-12-19 ASSESSMENT — PAIN - FUNCTIONAL ASSESSMENT
PAIN_FUNCTIONAL_ASSESSMENT: 0-10

## 2023-12-19 ASSESSMENT — PAIN DESCRIPTION - LOCATION: LOCATION: PELVIS

## 2023-12-19 NOTE — PROGRESS NOTES
"Colton Jiménez is a 88 y.o. male on day 1 of admission presenting with Fall, initial encounter.    SUBJECTIVE  Patient mentions falling down from slippery ice at his doorsteps and hitting his head twice.  Patient does endorse some headaches.  Patient denies chest pain, shortness of breath, lightheadedness, dizziness.  Patient able to complete most ADLs and takes care of his wife who has dementia and is also in the hospital.    OBJECTIVE    Physical Exam:  General: Laying in bed with c-collar in obvious distress  HEENT: Laceration across left eyebrow noted  Chest:  Clear to auscultation bilaterally  CV:  Regular rate and rhythm.    Extremities:  No lower extremity edema or cyanosis.   Neurological:  AAOx3.  Mildly dysarthric  Skin:  Warm and dry.      Last Recorded Vitals  Blood pressure 126/72, pulse 58, temperature 36.2 °C (97.2 °F), resp. rate 17, height 1.753 m (5' 9\"), weight 68 kg (150 lb), SpO2 96 %.  Intake/Output last 3 Shifts:  No intake/output data recorded.    Last CBC & BMP  Lab Results   Component Value Date    GLUCOSE 99 12/19/2023    CALCIUM 8.7 12/19/2023     12/19/2023    K 4.2 12/19/2023    CO2 33 (H) 12/19/2023     12/19/2023    BUN 17 12/19/2023    CREATININE 0.66 12/19/2023     Lab Results   Component Value Date    WBC 5.1 12/19/2023    HGB 12.1 (L) 12/19/2023    HCT 35.8 (L) 12/19/2023    MCV 98 12/19/2023     (L) 12/19/2023       ASSESSMENT & PLAN  Colton Jiménez is a 88 y.o. male with past medical history of Hx TIA, Hx DVT, HTN, HLD who presents to the hospital with complaints of fall. Patient states he went out to check his mail and slipped on the driveway. He did strike his head during the fall. He denies LOC. He states he has generally felt well before the fall. His COVID 19 PCR is positive. Looking at the chart looks like he had a symptomatic COVID 19 infection a few weeks ago and was treated with Paxlovid. Patient himself does not recall this but is clearly documented by his " PCP.     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 at disc protrusion and endplate spurring at C4-5 compressing the cord and is severely stenosing with spinal canal  Plan  -Neurosurgery consulted      #COVID 19 + pcr - false positive   -CXR shows no focal lung consolidation or effusion, asymptomatic will monitor      #HTN  #HLD  #Depression/Anxiety   #Hx TIA  #Hx pVT   -Resume home amlodipine, atorvastatin, no satellite, sertraline, aspirin, finasteride        Inpatient Checklist  Code Status: Full  DVT prophylaxis: SCDs, hold given evidence of active bleeding on CT  Lines/Drains/Tubes: IV peripheral  Diet: Regular  Disposition ->Destination: Medicine, likely SNF  ->DC Medications/Needs/Follow-ups: Follow-up with neurology in 4 months, follow-up with orthopedics in 10-14 days      Juan J Tejeda,   PGY-1, Internal Medicine  Please SecureChat for any further questions  This is a preliminary note, please await attending attestation for final A/P'

## 2023-12-19 NOTE — PROGRESS NOTES
"    Subjective   Patient complains of left pelvis pain and neck pain.  He has been removing his cervical collar.  No other complaints.  Cervical collar was replaced.       Objective     Physical Exam  Well-developed, well-nourished, no acute distress, awake and alert  HEENT: Left eyebrow abrasion.  Nontender  Neck: Mild posterior tenderness  Chest: Nontender  Abdomen: Nontender  Pelvis: Left sided pubis tenderness  Extremities: Nontender    Last Recorded Vitals  Blood pressure 172/72, pulse 63, temperature 36.2 °C (97.2 °F), resp. rate 16, height 1.753 m (5' 9\"), weight 68 kg (150 lb), SpO2 97 %.  Intake/Output last 3 Shifts:  No intake/output data recorded.    Relevant Results  Labs: WBC 5.1, hemoglobin 12.1 which is stable, platelet 119           Assessment/Plan   Principal Problem:    Fall, initial encounter  88-year-old male who suffered a same level fall yesterday afternoon.  Identified injuries include:  1.  Left superior/inferior pubic rami fractures with small hematoma.  Patient is hemodynamically stable.  Hemoglobin 12.1.  Minimal change from yesterday.  Orthopedic surgery has evaluated and recommends nonoperative therapy.  2.  Left eyebrow abrasion.  3.  Patient has some confusion and question of some mild aphasia yesterday.  History of TIAs.  No brain hemorrhage on CT scan.  MRI head and neck ordered.  4.  Mild neck discomfort and tenderness.  No injury identified on exam.  No C-spine fracture noted on CT scan.  Await results of MRI.  Leave cervical collar in place until MRI results available.    Calos Romero MD  "

## 2023-12-19 NOTE — NURSING NOTE
Upon walking into patient's room, his c-collar was off. C-collar reapplied and patient instructed to leave collar on. Patient appeared confused at this time. Upon completing patient's admission, patient asked about his wallet.  Belongings were in a bag in his room.  Clothes were preset and all wet.  Pants and shirt were cut apart. Belongings searched and no wallet or other valuables present.

## 2023-12-19 NOTE — CONSULTS
Reason For Consult  Trauma    History Of Present Illness  Colton Jiménez is a 88 y.o. male who fell outside onto the ground this afternoon.  He states that he slipped and fell and hit his buttocks and face.  He tried to get up and fell again.  He did not have any loss of consciousness.  A neighbor called EMS.  The patient states he was on the ground for about half an hour.  He complains of left hip pain.  He has some neck discomfort.    Past medical history:  Patient is a poor historian and is unable to give any significant past medical history.  Electronic health records reviewed and patient has history of TIA and cervical stenosis.     Past Medical History  He has a past medical history of Personal history of malignant neoplasm, unspecified, Personal history of other diseases of the circulatory system, Personal history of other diseases of the digestive system, Personal history of other diseases of the digestive system, Personal history of other diseases of the musculoskeletal system and connective tissue, Personal history of other diseases of the musculoskeletal system and connective tissue, Personal history of other specified conditions, and Personal history of other specified conditions.    Surgical History  He has a past surgical history that includes Other surgical history (09/11/2017); Colonoscopy (09/11/2017); Cholecystectomy (09/11/2017); and Cataract extraction (09/11/2017).     Social History  He has no history on file for tobacco use, alcohol use, and drug use.    Family History  No family history on file.     Allergies  Patient has no known allergies.     Physical Exam  Constitutional: Well-developed, well-nourished, awake and alert, no acute distress  Skin: Warm and dry, no lesions, no rashes, no jaundice  HEENT: Normocephalic, EOMI, no scleral icterus, eyes have no redness or swelling or discharge, external inspection of ears and nose is normal, mucous membranes moist  Left eyebrow abrasion with some  "surrounding blood.  No active bleeding.  Nontender.  Neck: Soft, no mass or adenopathy.  Complains of some neck discomfort.  Mild neck tenderness on palpation and mild discomfort with movement.  Cervical collar left in place.  Cardiac: Regular rate and rhythm, no murmur  Chest: Patent airway, clear to auscultation, normal breath sounds with good chest expansion, no wheezes or rales or rhonchi noted, thorax symmetric  Abdomen: Nondistended, positive bowel sounds, soft, nontender, no mass  Pelvis: Left pelvic tenderness along the left pubic ramus region.  No swelling or ecchymosis or erythema  Rectal: Not performed  Extremities: No injury, no lower extremity edema or calf tenderness  Lymphatic: No cervical adenopathy  Musculoskeletal: Back nontender.  Severe left pelvic pain when logrolling patient.  Patient was logrolled just enough to see his back and was then returned to supine position.  Neurological: Awake and alert.  Question of mild aphasia as he has difficulty finding the correct word.  No obvious focal neurologic abnormalities  Psychological: Appropriate mood and behavior     Last Recorded Vitals  Blood pressure (!) 142/92, pulse 70, temperature 36.2 °C (97.2 °F), resp. rate 16, height 1.753 m (5' 9\"), weight 68 kg (150 lb), SpO2 100 %.    Relevant Results  Labs: WBC 4.1, hemoglobin 12.6, platelet 147.  CMP unremarkable.  Lactic acid 1.9.    I reviewed the CT scans of the head, C-T-L spine, chest/abdomen/pelvis.  Left superior/inferior pubic rami fractures with hemorrhage.  Small hematoma.  CT abdomen also shows pneumobilia.    Assessment/Plan   88-year-old male who suffered a same level fall this afternoon.  Identified injuries include:  1.  Left superior/inferior pubic rami fractures with small hematoma.  Patient is hemodynamically stable.  Hemoglobin 12.6.  Orthopedic surgery has been consulted.  Further evaluation and treatment per their service.  2.  Left eyebrow abrasion.  3.  Patient has some confusion " and question of some mild aphasia.  History of TIAs.  No brain hemorrhage on CT scan.  Further evaluation and treatment per internal medicine service.  4.  Mild neck discomfort and tenderness.  No injury identified on exam.  No C-spine fracture noted on CT scan.  Leave cervical collar in place for now and reexamine neck in AM.    Calos Romero MD

## 2023-12-19 NOTE — CONSULTS
Trauma History and Physical        Subjective   Patient is a 88 y.o. male admitted on 12/18/2023  5:44 PM  Arrival Date: 12/18/23   Activation Time: 1714  Trauma Activation Level: limited  Date of injury: 12/18/23   Time of injury: Immediately prior to arrival    HPI:  Pt having significant word finding difficulties and is a limited historian. Pt reportedly found down in the snow by his neighbor. Estimated down time ~30 minutes, positive head strike, pt denies LOC, unwitnessed fall. Pt endorsing Lt pelvic pain and neck pain.     Mechanism of injury: fall  Method of Arrival: EMS  Prior to arrival: N/A    PRIMARY SURVEY:  Airway: intact  Breathing: equal breath sounds and unlabored  Circulation: pulses intact and equal and no obvious source of hemorrhage  Disability:  GCS 14, A&Ox3  Exposure/Environment: Clothing removed, injuries noted.    Procedures: none    SECONDARY SURVEY:    PMH: MDD, duodenal ulcer, HTN, thrombocytopenia, TIA, and paroxysmal Vtach; Fell in October, syncopal episode in February?  Meds: ASA 83  PSHx: lumbar fusion, laparoscopic cholecystectomy  Social: lives at home with wife, he is her primary caregiver after she had a stroke but she is currently in the hospital with COVID.    Patient has no known allergies.  Social History     Tobacco Use    Smoking status: Unknown     No family history on file.    Objective   Vitals:  Most Recent:  Vitals:    12/18/23 1953   BP: (!) 142/92   Pulse: 70   Resp: 16   Temp:    SpO2: 100%       24hr Min/Max:  Temp  Min: 35.6 °C (96.1 °F)  Max: 36.2 °C (97.2 °F)  Pulse  Min: 61  Max: 91  BP  Min: 142/92  Max: 177/84  Resp  Min: 16  Max: 19  SpO2  Min: 91 %  Max: 100 %    Hemodynamic parameters for last 24 hours:       No intake/output data recorded.      Physical exam:    Physical Exam  Constitutional:       General: He is not in acute distress.     Appearance: Normal appearance. He is not ill-appearing.   HENT:      Head: Laceration (Lt eyebrow, hemostatic)  present. No raccoon eyes or Mack's sign.      Jaw: No malocclusion.      Right Ear: External ear normal.      Left Ear: External ear normal.      Nose: No nasal deformity, septal deviation or signs of injury.      Right Nostril: No epistaxis or septal hematoma.      Left Nostril: No epistaxis or septal hematoma.      Mouth/Throat:      Lips: Pink.      Mouth: Mucous membranes are moist. No injury.      Dentition: Normal dentition.   Eyes:      Extraocular Movements: Extraocular movements intact.      Pupils: Pupils are equal, round, and reactive to light.   Neck:      Trachea: No tracheal deviation.      Comments: No step-offs  Cardiovascular:      Rate and Rhythm: Normal rate and regular rhythm.      Heart sounds: Normal heart sounds.   Pulmonary:      Effort: Pulmonary effort is normal. No respiratory distress.      Breath sounds: Normal breath sounds and air entry.   Chest:      Chest wall: No deformity, tenderness or crepitus.      Comments: No chest wall ecchymoses or hematoma  Abdominal:      General: Abdomen is scaphoid. Bowel sounds are normal. There is no distension.      Palpations: Abdomen is soft.   Genitourinary:     Penis: Normal.       Comments: TTP over lt pubic bone, no palpable deformity, pelvis stable to AP and lateral compression. No blood at urethral meatus.  Musculoskeletal:         General: No deformity. Normal range of motion.      Cervical back: Bony tenderness (Point tender over C7) present. No deformity. Pain with movement and muscular tenderness present.      Thoracic back: Normal. No deformity or bony tenderness.      Lumbar back: Normal. No deformity or bony tenderness.      Comments: No joint tenderness or swelling   Skin:     General: Skin is warm and dry.      Coloration: Skin is not pale.   Neurological:      Mental Status: He is alert. He is confused.      GCS: GCS eye subscore is 4. GCS verbal subscore is 4. GCS motor subscore is 6.      Cranial Nerves: Cranial nerves 2-12 are  intact.      Sensory: Sensation is intact.      Motor: Motor function is intact.      Comments: Word finding difficulties         Lab/Radiology/Diagnostic Review:  Results for orders placed or performed during the hospital encounter of 12/18/23 (from the past 24 hour(s))   POCT GLUCOSE   Result Value Ref Range    POCT Glucose 91 74 - 99 mg/dL   CBC and Auto Differential   Result Value Ref Range    WBC 4.1 (L) 4.4 - 11.3 x10*3/uL    nRBC 0.0 0.0 - 0.0 /100 WBCs    RBC 3.86 (L) 4.50 - 5.90 x10*6/uL    Hemoglobin 12.6 (L) 13.5 - 17.5 g/dL    Hematocrit 37.7 (L) 41.0 - 52.0 %    MCV 98 80 - 100 fL    MCH 32.6 26.0 - 34.0 pg    MCHC 33.4 32.0 - 36.0 g/dL    RDW 13.6 11.5 - 14.5 %    Platelets 147 (L) 150 - 450 x10*3/uL    Neutrophils % 51.0 40.0 - 80.0 %    Immature Granulocytes %, Automated 0.7 0.0 - 0.9 %    Lymphocytes % 33.3 13.0 - 44.0 %    Monocytes % 11.6 2.0 - 10.0 %    Eosinophils % 2.7 0.0 - 6.0 %    Basophils % 0.7 0.0 - 2.0 %    Neutrophils Absolute 2.07 1.60 - 5.50 x10*3/uL    Immature Granulocytes Absolute, Automated 0.03 0.00 - 0.50 x10*3/uL    Lymphocytes Absolute 1.35 0.80 - 3.00 x10*3/uL    Monocytes Absolute 0.47 0.05 - 0.80 x10*3/uL    Eosinophils Absolute 0.11 0.00 - 0.40 x10*3/uL    Basophils Absolute 0.03 0.00 - 0.10 x10*3/uL   Comprehensive Metabolic Panel   Result Value Ref Range    Glucose 90 74 - 99 mg/dL    Sodium 140 136 - 145 mmol/L    Potassium 3.7 3.5 - 5.3 mmol/L    Chloride 103 98 - 107 mmol/L    Bicarbonate 32 21 - 32 mmol/L    Anion Gap 9 (L) 10 - 20 mmol/L    Urea Nitrogen 20 6 - 23 mg/dL    Creatinine 0.83 0.50 - 1.30 mg/dL    eGFR 85 >60 mL/min/1.73m*2    Calcium 9.1 8.6 - 10.3 mg/dL    Albumin 4.1 3.4 - 5.0 g/dL    Alkaline Phosphatase 61 33 - 136 U/L    Total Protein 7.0 6.4 - 8.2 g/dL    AST 19 9 - 39 U/L    Bilirubin, Total 1.1 0.0 - 1.2 mg/dL    ALT 14 10 - 52 U/L   Lactate   Result Value Ref Range    Lactate 1.9 0.4 - 2.0 mmol/L   Protime-INR   Result Value Ref Range     Protime 13.1 (H) 9.8 - 12.8 seconds    INR 1.2 (H) 0.9 - 1.1   Type And Screen   Result Value Ref Range    ABO TYPE O     Rh TYPE POS     ANTIBODY SCREEN NEG    Creatine Kinase   Result Value Ref Range    Creatine Kinase 89 0 - 325 U/L     CT thoracic spine wo IV contrast    Result Date: 12/18/2023  Interpreted By:  Minor Reddy, STUDY: CT CHEST ABDOMEN PELVIS W IV CONTRAST; CT LUMBAR SPINE WO IV CONTRAST; CT THORACIC SPINE WO IV CONTRAST;  12/18/2023 6:24 pm   INDICATION: Signs/Symptoms:Trauma; Signs/Symptoms:fall.   COMPARISON: CT abdomen pelvis 06/15/2021   ACCESSION NUMBER(S): [id number]; [id number]; [id number]   ORDERING CLINICIAN: AKSHAT PIKE   TECHNIQUE: CT of the chest, abdomen, and pelvis was performed.  Contiguous axial images were obtained at 3 mm slice thickness through the chest, abdomen and pelvis. Coronal and sagittal reconstructions at 3 mm slice thickness were performed. Multiplanar reconstructions were processed of the thoracic and lumbar spine on a separate workstation. 70 ml of contrast  were administered intravenously without immediate complication.   FINDINGS: CHEST:   LUNG/PLEURA/LARGE AIRWAYS: No endobronchial lesion is seen.   Minimal dependent atelectatic change of the left lower lobe. Subcentimeter calcified nodule the right lower lobe compatible with old granulomatous disease.   No pulmonary contusion.   VESSELS: No evidence of thoracic aortic injury. No evidence of thoracic   The main pulmonary artery and its branches are unremarkable with respect course, caliber, and contour   No significant coronary atherosclerotic calcifications are seen.   HEART: Heart size within normal limits. No pericardial effusion.   MEDIASTINUM AND MARILIA: No pathologic enlarged mediastinal lymph nodes by CT criteria. Subcentimeter mediastinal lymph nodes are noted, nonspecific and possibly reactive.   CHEST WALL AND LOWER NECK: No acute osseous abnormality. Multilevel presumed degenerative changes  throughout the imaged spine. No acute soft tissue abnormality.   Thoracic spine:   No acute displaced fracture.Multilevel bridging anterior osteophyte formation suggestive of diffuse idiopathic skeletal hyperostosis. Please note this predisposes the patient to increased risk of injury due to relatively minor trauma and there is a level of focal point tenderness, MRI may be performed for further assessment of occult injury.   ABDOMEN:   LIVER: No evidence of acute injury.   BILE DUCTS: Nonspecific pneumobilia, possibly related to prior procedure.   GALLBLADDER: No calcified gallstones.   PANCREAS: Unremarkable.   SPLEEN: No evidence of acute injury.   ADRENAL GLANDS: No evidence of acute injury.   KIDNEYS AND URETERS: No evidence of renal laceration or contusion. Nonobstructing bilateral nephrolithiasis. No obstructing urolithiasis.   PELVIS:   BLADDER: Decompressed and partially effaced on the left due to space of rates hematoma.   REPRODUCTIVE ORGANS: Prostate is enlarged at 5.6 x 4.8 cm.   BOWEL: No secondary signs of bowel wall injury. No pathologic distension of visualized large or small bowel.     VESSELS: There is no aneurysmal dilatation of the abdominal aorta. No evidence of abdominal aortic injury.   PERITONEUM/RETROPERITONEUM/LYMPH NODES: No free intraperitoneal gas. No enlarged mesenteric lymph nodes.   BONE AND SOFT TISSUE: There is a comminuted fracture of the left superior inferior pubic rami. There is associated left-sided space of Retzius hematoma with a small region of density (series 602, image 44) on the arterial phase exam which increases in density and conspicuity on the portal venous phase delay (series 702 image 44) suggestive of a small focus of active extravasation/active bleeding. This measures up to 0.6 x 0.3 cm on the arterial phase imaging and 1.4 x 0.5 cm on the portal venous phase imaging. .   Lumbar spine:   No acute displaced fracture.L4-S1 posterior fusion which appears similar to  prior comparison imaging. Multilevel disc space narrowing. Multilevel facet arthropathy. Multilevel anterior osteophyte formation. Multilevel vacuum disc phenomena.       CHEST: 1.  No CT evidence of acute intrathoracic injury. 2. No CT evidence of acute thoracic spine injury.   ABDOMEN-PELVIS: 1.  Acute, comminuted fractures of the left superior inferior pubic ramus with associated space of right sees hematoma along the left. There is a small region of density as described above that increases in conspicuity between the arterial and portal venous phase imaging compatible with a small focus of active bleeding within the region immediately superior to the left superior pubic ramus. 2. No acute osseous abnormality detected of the lumbar spine. 3. No CT evidence of acute solid organ injury.     MACRO: Minor Reddy discussed the significance and urgency of this critical finding via epic secure chat with  AKSHAT PIKE on 12/18/2023 at 7:09 pm.  (**-RCF-**) Findings:  See findings.     Signed by: Minor Reddy 12/18/2023 7:10 PM Dictation workstation:   [id number]    CT lumbar spine wo IV contrast    Result Date: 12/18/2023  Interpreted By:  Minor Reddy, STUDY: CT CHEST ABDOMEN PELVIS W IV CONTRAST; CT LUMBAR SPINE WO IV CONTRAST; CT THORACIC SPINE WO IV CONTRAST;  12/18/2023 6:24 pm   INDICATION: Signs/Symptoms:Trauma; Signs/Symptoms:fall.   COMPARISON: CT abdomen pelvis 06/15/2021   ACCESSION NUMBER(S): [id number]; [id number]; [id number]   ORDERING CLINICIAN: AKSHAT PIKE   TECHNIQUE: CT of the chest, abdomen, and pelvis was performed.  Contiguous axial images were obtained at 3 mm slice thickness through the chest, abdomen and pelvis. Coronal and sagittal reconstructions at 3 mm slice thickness were performed. Multiplanar reconstructions were processed of the thoracic and lumbar spine on a separate workstation. 70 ml of contrast  were administered intravenously without immediate complication.    FINDINGS: CHEST:   LUNG/PLEURA/LARGE AIRWAYS: No endobronchial lesion is seen.   Minimal dependent atelectatic change of the left lower lobe. Subcentimeter calcified nodule the right lower lobe compatible with old granulomatous disease.   No pulmonary contusion.   VESSELS: No evidence of thoracic aortic injury. No evidence of thoracic   The main pulmonary artery and its branches are unremarkable with respect course, caliber, and contour   No significant coronary atherosclerotic calcifications are seen.   HEART: Heart size within normal limits. No pericardial effusion.   MEDIASTINUM AND MARILIA: No pathologic enlarged mediastinal lymph nodes by CT criteria. Subcentimeter mediastinal lymph nodes are noted, nonspecific and possibly reactive.   CHEST WALL AND LOWER NECK: No acute osseous abnormality. Multilevel presumed degenerative changes throughout the imaged spine. No acute soft tissue abnormality.   Thoracic spine:   No acute displaced fracture.Multilevel bridging anterior osteophyte formation suggestive of diffuse idiopathic skeletal hyperostosis. Please note this predisposes the patient to increased risk of injury due to relatively minor trauma and there is a level of focal point tenderness, MRI may be performed for further assessment of occult injury.   ABDOMEN:   LIVER: No evidence of acute injury.   BILE DUCTS: Nonspecific pneumobilia, possibly related to prior procedure.   GALLBLADDER: No calcified gallstones.   PANCREAS: Unremarkable.   SPLEEN: No evidence of acute injury.   ADRENAL GLANDS: No evidence of acute injury.   KIDNEYS AND URETERS: No evidence of renal laceration or contusion. Nonobstructing bilateral nephrolithiasis. No obstructing urolithiasis.   PELVIS:   BLADDER: Decompressed and partially effaced on the left due to space of rates hematoma.   REPRODUCTIVE ORGANS: Prostate is enlarged at 5.6 x 4.8 cm.   BOWEL: No secondary signs of bowel wall injury. No pathologic distension of visualized large or  small bowel.     VESSELS: There is no aneurysmal dilatation of the abdominal aorta. No evidence of abdominal aortic injury.   PERITONEUM/RETROPERITONEUM/LYMPH NODES: No free intraperitoneal gas. No enlarged mesenteric lymph nodes.   BONE AND SOFT TISSUE: There is a comminuted fracture of the left superior inferior pubic rami. There is associated left-sided space of Retzius hematoma with a small region of density (series 602, image 44) on the arterial phase exam which increases in density and conspicuity on the portal venous phase delay (series 702 image 44) suggestive of a small focus of active extravasation/active bleeding. This measures up to 0.6 x 0.3 cm on the arterial phase imaging and 1.4 x 0.5 cm on the portal venous phase imaging. .   Lumbar spine:   No acute displaced fracture.L4-S1 posterior fusion which appears similar to prior comparison imaging. Multilevel disc space narrowing. Multilevel facet arthropathy. Multilevel anterior osteophyte formation. Multilevel vacuum disc phenomena.       CHEST: 1.  No CT evidence of acute intrathoracic injury. 2. No CT evidence of acute thoracic spine injury.   ABDOMEN-PELVIS: 1.  Acute, comminuted fractures of the left superior inferior pubic ramus with associated space of right sees hematoma along the left. There is a small region of density as described above that increases in conspicuity between the arterial and portal venous phase imaging compatible with a small focus of active bleeding within the region immediately superior to the left superior pubic ramus. 2. No acute osseous abnormality detected of the lumbar spine. 3. No CT evidence of acute solid organ injury.     MACRO: Minor Reddy discussed the significance and urgency of this critical finding via epic secure chat with  AKSHAT PIKE on 12/18/2023 at 7:09 pm.  (**-RCF-**) Findings:  See findings.     Signed by: Minor Reddy 12/18/2023 7:10 PM Dictation workstation:   [id number]    CT chest abdomen  pelvis w IV contrast    Result Date: 12/18/2023  Interpreted By:  Minor Reddy, STUDY: CT CHEST ABDOMEN PELVIS W IV CONTRAST; CT LUMBAR SPINE WO IV CONTRAST; CT THORACIC SPINE WO IV CONTRAST;  12/18/2023 6:24 pm   INDICATION: Signs/Symptoms:Trauma; Signs/Symptoms:fall.   COMPARISON: CT abdomen pelvis 06/15/2021   ACCESSION NUMBER(S): [id number]; [id number]; [id number]   ORDERING CLINICIAN: AKSHAT PIKE   TECHNIQUE: CT of the chest, abdomen, and pelvis was performed.  Contiguous axial images were obtained at 3 mm slice thickness through the chest, abdomen and pelvis. Coronal and sagittal reconstructions at 3 mm slice thickness were performed. Multiplanar reconstructions were processed of the thoracic and lumbar spine on a separate workstation. 70 ml of contrast  were administered intravenously without immediate complication.   FINDINGS: CHEST:   LUNG/PLEURA/LARGE AIRWAYS: No endobronchial lesion is seen.   Minimal dependent atelectatic change of the left lower lobe. Subcentimeter calcified nodule the right lower lobe compatible with old granulomatous disease.   No pulmonary contusion.   VESSELS: No evidence of thoracic aortic injury. No evidence of thoracic   The main pulmonary artery and its branches are unremarkable with respect course, caliber, and contour   No significant coronary atherosclerotic calcifications are seen.   HEART: Heart size within normal limits. No pericardial effusion.   MEDIASTINUM AND MARILIA: No pathologic enlarged mediastinal lymph nodes by CT criteria. Subcentimeter mediastinal lymph nodes are noted, nonspecific and possibly reactive.   CHEST WALL AND LOWER NECK: No acute osseous abnormality. Multilevel presumed degenerative changes throughout the imaged spine. No acute soft tissue abnormality.   Thoracic spine:   No acute displaced fracture.Multilevel bridging anterior osteophyte formation suggestive of diffuse idiopathic skeletal hyperostosis. Please note this predisposes the  patient to increased risk of injury due to relatively minor trauma and there is a level of focal point tenderness, MRI may be performed for further assessment of occult injury.   ABDOMEN:   LIVER: No evidence of acute injury.   BILE DUCTS: Nonspecific pneumobilia, possibly related to prior procedure.   GALLBLADDER: No calcified gallstones.   PANCREAS: Unremarkable.   SPLEEN: No evidence of acute injury.   ADRENAL GLANDS: No evidence of acute injury.   KIDNEYS AND URETERS: No evidence of renal laceration or contusion. Nonobstructing bilateral nephrolithiasis. No obstructing urolithiasis.   PELVIS:   BLADDER: Decompressed and partially effaced on the left due to space of rates hematoma.   REPRODUCTIVE ORGANS: Prostate is enlarged at 5.6 x 4.8 cm.   BOWEL: No secondary signs of bowel wall injury. No pathologic distension of visualized large or small bowel.     VESSELS: There is no aneurysmal dilatation of the abdominal aorta. No evidence of abdominal aortic injury.   PERITONEUM/RETROPERITONEUM/LYMPH NODES: No free intraperitoneal gas. No enlarged mesenteric lymph nodes.   BONE AND SOFT TISSUE: There is a comminuted fracture of the left superior inferior pubic rami. There is associated left-sided space of Retzius hematoma with a small region of density (series 602, image 44) on the arterial phase exam which increases in density and conspicuity on the portal venous phase delay (series 702 image 44) suggestive of a small focus of active extravasation/active bleeding. This measures up to 0.6 x 0.3 cm on the arterial phase imaging and 1.4 x 0.5 cm on the portal venous phase imaging. .   Lumbar spine:   No acute displaced fracture.L4-S1 posterior fusion which appears similar to prior comparison imaging. Multilevel disc space narrowing. Multilevel facet arthropathy. Multilevel anterior osteophyte formation. Multilevel vacuum disc phenomena.       CHEST: 1.  No CT evidence of acute intrathoracic injury. 2. No CT evidence of  acute thoracic spine injury.   ABDOMEN-PELVIS: 1.  Acute, comminuted fractures of the left superior inferior pubic ramus with associated space of right sees hematoma along the left. There is a small region of density as described above that increases in conspicuity between the arterial and portal venous phase imaging compatible with a small focus of active bleeding within the region immediately superior to the left superior pubic ramus. 2. No acute osseous abnormality detected of the lumbar spine. 3. No CT evidence of acute solid organ injury.     MACRO: Minor Reddy discussed the significance and urgency of this critical finding via epic secure chat with  AKSHAT PIKE on 12/18/2023 at 7:09 pm.  (**-RCF-**) Findings:  See findings.     Signed by: Minor Reddy 12/18/2023 7:10 PM Dictation workstation:   [id number]    CT cervical spine wo IV contrast    Result Date: 12/18/2023  Interpreted By:  Minor Reddy, STUDY: CT CERVICAL SPINE WO IV CONTRAST;  12/18/2023 6:24 pm   INDICATION: Signs/Symptoms:fall.   COMPARISON: None.   ACCESSION NUMBER(S): [id number]   ORDERING CLINICIAN: AKSHAT PIKE   TECHNIQUE: Axial CT images of the cervical spine are obtained. Axial, coronal and sagittal reconstructions are provided for review.   FINDINGS:     Fractures: There is no evidence for an acute fracture of the cervical spine.   Vertebral Alignment: No posttraumatic malalignment is detected.   Craniocervical Junction: The odontoid process and craniocervical junction are intact.   Vertebrae/Disc Spaces:  Multilevel disc space narrowing. Multilevel facet arthropathy Multilevel uncovertebral hypertrophy.Multilevel osteophyte formation.   Prevertebral/Paraspinal Soft Tissues: The prevertebral and paraspinal soft tissues are unremarkable.         Multilevel spondylosis without acute osseous abnormality of the cervical spine.   MACRO: None   Signed by: Minor Reddy 12/18/2023 6:37 PM Dictation workstation:    [id number]    CT head W O contrast trauma protocol    Result Date: 12/18/2023  Interpreted By:  Minor Reddy, STUDY: CT HEAD W/O CONTRAST TRAUMA PROTOCOL; CT FACIAL BONES WO IV CONTRAST; 12/18/2023 6:24 pm   INDICATION: Signs/Symptoms:head injury, mental status change; Signs/Symptoms:fall, confusion.   COMPARISON: Head CT 02/06/2023   ACCESSION NUMBER(S): [id number]; [id number]   ORDERING CLINICIAN: AKSHAT PIKE   TECHNIQUE: Noncontrast volumetric CT scan of head and facial bones was performed. Angled reformats in brain and bone windows were generated. The images were reviewed in bone, brain, blood and soft tissue windows. Three-dimensional reformations were processed of the facial bones on a separate workstation.   FINDINGS: CSF Spaces: The ventricles, sulci and basal cisterns are within normal limits. There is no extraaxial fluid collection.   Parenchyma:  The grey-white differentiation is intact. There is no mass effect or midline shift.  There is no intracranial hemorrhage.   Calvarium: The calvarium is unremarkable.   Facial bones, paranasal sinuses and mastoids: No acute displaced fracture. Pterygoid plates are intact. Bilateral mandibular condyles are well situated. Orbits appear symmetric without evidence of retrobulbar hematoma.       No CT evidence of acute intracranial injury.   No CT evidence of acute displaced facial bone fracture.   MACRO: None     Signed by: Minor Reddy 12/18/2023 6:34 PM Dictation workstation:   [id number]    CT maxillofacial bones wo IV contrast    Result Date: 12/18/2023  Interpreted By:  Minor Reddy, STUDY: CT HEAD W/O CONTRAST TRAUMA PROTOCOL; CT FACIAL BONES WO IV CONTRAST; 12/18/2023 6:24 pm   INDICATION: Signs/Symptoms:head injury, mental status change; Signs/Symptoms:fall, confusion.   COMPARISON: Head CT 02/06/2023   ACCESSION NUMBER(S): [id number]; [id number]   ORDERING CLINICIAN: AKSAHT PIKE   TECHNIQUE: Noncontrast volumetric CT scan of head and  facial bones was performed. Angled reformats in brain and bone windows were generated. The images were reviewed in bone, brain, blood and soft tissue windows. Three-dimensional reformations were processed of the facial bones on a separate workstation.   FINDINGS: CSF Spaces: The ventricles, sulci and basal cisterns are within normal limits. There is no extraaxial fluid collection.   Parenchyma:  The grey-white differentiation is intact. There is no mass effect or midline shift.  There is no intracranial hemorrhage.   Calvarium: The calvarium is unremarkable.   Facial bones, paranasal sinuses and mastoids: No acute displaced fracture. Pterygoid plates are intact. Bilateral mandibular condyles are well situated. Orbits appear symmetric without evidence of retrobulbar hematoma.       No CT evidence of acute intracranial injury.   No CT evidence of acute displaced facial bone fracture.   MACRO: None     Signed by: Minor Reddy 12/18/2023 6:34 PM Dictation workstation:   [id number]    Assessment /Plan    Patient is an 89 y/o male with PMH significant for MDD, duodenal ulcer, HTN, thrombocytopenia, TIA, and paroxysmal Vtach who presented to [facility] on 12/18 as a limited trauma after fall from standing. Pt reportedly found down in the snow by his neighbor. Estimated down time ~30 minutes, positive head strike, pt denies LOC, unwitnessed fall. Pt endorsing Lt pelvic pain and neck pain.     List of Injuries/Significant Findings:  Lt comminuted and displaced pubic rami fx  Pelvic hematoma  Lt scalp laceration  Neck pain and tenderness  Aphasia? History of TIA  Frequent falls, prior syncope episode    Assessment and Recommendations:  - Concern for stroke or TIA given new aphasia and confusion    > Consider MRI head and/or neurology consult  - Neck pain likely muscular, but point tender at C7    > Continue C-collar at all times for now    > Consider MRI neck and/or NSGY consult  - Ortho consulted for pubic fracture  -  Strict bed rest until evaluated by ortho  - NPO after midnight for possible OR  - Multimodal pain control    > Scheduled tylenol, gabapentin, and lidocaine patch    > Avoid NSAIDs given h/o duodenal ulcer    > PRN morphine for mod-severe pain  - Outpatient falls clinic referral   - Hold DVT chemoprophylaxis given evidence of active bleeding on CT  - DVT ppx with SCDs only  - PT/OT once cleared by ortho    Dispo: admit to [facility], discharge location pending PT/OT eval    Patient seen and discussed with attending surgeon, Dr. Romero.    I spent 60 minutes in the professional and overall care of this patient.      Delilah Stevenson PA-C

## 2023-12-19 NOTE — HOSPITAL COURSE
Colton Jiménez is a 88 y.o. male who presents to the hospital with complaints of fall. Patient states he went out to check his mail and slipped on the driveway. He did strike his head during the fall. He denies LOC. He states he has generally felt well before the fall. His COVID 19 PCR is positive. Looking at the chart looks like he had a symptomatic COVID 19 infection a few weeks ago and was treated with Paxlovid. Patient himself does not recall this but is clearly documented by his PCP.

## 2023-12-19 NOTE — PROGRESS NOTES
"Colton Jiménez is a 88 y.o. male on day 1 of admission presenting with Fall, initial encounter.    Subjective   Patient complains of continued head and neck pain. No pelvic pain at rest.        Objective     Physical Exam   Secondary Survey:  NEURO: A&O x3, GCS 15, CN II-XII intact, TOLEDO equally, muscle strength 5/5, no sensory deficits. Intermittent trouble with work finding  HEAD: NC/AT, Small lac above left eyebrow, no bony step offs, midface stable.  EENT: PERRL, EOMI. Pupils 4-2mm b/l. external ear without laceration. Nasal septum midline, no crepitus or septal hematoma. Oral mucosa and tongue without lacerations, teeth in place.   NECK: No cervical spine tenderness or step offs, no lacerations or abrasions, trachea midline. No JVD. Cervical neck tenderness on palpation, c-collar in place  RESPIRATORY/CHEST: No abrasions, contusions, crepitus or tenderness to palpation. Non-labored, equal chest expansion, CTAB, no W/R/R.  CV: RRR, nml S1 and S2, no M/R/G. Pulses bilateral: 2+ radial, 2+DP, 2+PT,  2+femoral and 2+ carotid. No TTP of chest  ABDOMEN: soft, nontender, nondistended. No scars, abrasions or lacerations.  PELVIS: Stable to compression. Mild tenderness on palpation  : nml external genitalia, no blood at urethral meatus  RECTAL: rectal tone intact with no gross blood noted on exam.  BACK/SPINE: No thoracic midline tenderness, step-offs or deformities. No lumbar midline tenderness, step-offs, or deformities.  No abrasions, hematomas or lacerations noted.  EXTREMITIES: No edema or cyanosis. Nml ROM w/o pain. No deformities, lacerations or contusions.      Last Recorded Vitals  Blood pressure 126/72, pulse 58, temperature 36.2 °C (97.2 °F), resp. rate 17, height 1.753 m (5' 9\"), weight 68 kg (150 lb), SpO2 96 %.  Intake/Output last 3 Shifts:  No intake/output data recorded.    Relevant Results              Results for orders placed or performed during the hospital encounter of 12/18/23 (from the past 24 " hour(s))   POCT GLUCOSE   Result Value Ref Range    POCT Glucose 91 74 - 99 mg/dL   CBC and Auto Differential   Result Value Ref Range    WBC 4.1 (L) 4.4 - 11.3 x10*3/uL    nRBC 0.0 0.0 - 0.0 /100 WBCs    RBC 3.86 (L) 4.50 - 5.90 x10*6/uL    Hemoglobin 12.6 (L) 13.5 - 17.5 g/dL    Hematocrit 37.7 (L) 41.0 - 52.0 %    MCV 98 80 - 100 fL    MCH 32.6 26.0 - 34.0 pg    MCHC 33.4 32.0 - 36.0 g/dL    RDW 13.6 11.5 - 14.5 %    Platelets 147 (L) 150 - 450 x10*3/uL    Neutrophils % 51.0 40.0 - 80.0 %    Immature Granulocytes %, Automated 0.7 0.0 - 0.9 %    Lymphocytes % 33.3 13.0 - 44.0 %    Monocytes % 11.6 2.0 - 10.0 %    Eosinophils % 2.7 0.0 - 6.0 %    Basophils % 0.7 0.0 - 2.0 %    Neutrophils Absolute 2.07 1.60 - 5.50 x10*3/uL    Immature Granulocytes Absolute, Automated 0.03 0.00 - 0.50 x10*3/uL    Lymphocytes Absolute 1.35 0.80 - 3.00 x10*3/uL    Monocytes Absolute 0.47 0.05 - 0.80 x10*3/uL    Eosinophils Absolute 0.11 0.00 - 0.40 x10*3/uL    Basophils Absolute 0.03 0.00 - 0.10 x10*3/uL   Comprehensive Metabolic Panel   Result Value Ref Range    Glucose 90 74 - 99 mg/dL    Sodium 140 136 - 145 mmol/L    Potassium 3.7 3.5 - 5.3 mmol/L    Chloride 103 98 - 107 mmol/L    Bicarbonate 32 21 - 32 mmol/L    Anion Gap 9 (L) 10 - 20 mmol/L    Urea Nitrogen 20 6 - 23 mg/dL    Creatinine 0.83 0.50 - 1.30 mg/dL    eGFR 85 >60 mL/min/1.73m*2    Calcium 9.1 8.6 - 10.3 mg/dL    Albumin 4.1 3.4 - 5.0 g/dL    Alkaline Phosphatase 61 33 - 136 U/L    Total Protein 7.0 6.4 - 8.2 g/dL    AST 19 9 - 39 U/L    Bilirubin, Total 1.1 0.0 - 1.2 mg/dL    ALT 14 10 - 52 U/L   Lactate   Result Value Ref Range    Lactate 1.9 0.4 - 2.0 mmol/L   Protime-INR   Result Value Ref Range    Protime 13.1 (H) 9.8 - 12.8 seconds    INR 1.2 (H) 0.9 - 1.1   Type And Screen   Result Value Ref Range    ABO TYPE O     Rh TYPE POS     ANTIBODY SCREEN NEG    Creatine Kinase   Result Value Ref Range    Creatine Kinase 89 0 - 325 U/L   Lipid Panel   Result Value Ref  Range    Cholesterol 150 0 - 199 mg/dL    HDL-Cholesterol 74.1 mg/dL    Cholesterol/HDL Ratio 2.0     LDL Calculated 48 <=99 mg/dL    VLDL 28 0 - 40 mg/dL    Triglycerides 141 0 - 149 mg/dL    Non HDL Cholesterol 76 0 - 149 mg/dL   SARS-CoV-2 RT PCR   Result Value Ref Range    Coronavirus 2019, PCR Detected (A) Not Detected   Hemoglobin and hematocrit, blood   Result Value Ref Range    Hemoglobin 12.2 (L) 13.5 - 17.5 g/dL    Hematocrit 36.4 (L) 41.0 - 52.0 %   CBC   Result Value Ref Range    WBC 5.1 4.4 - 11.3 x10*3/uL    nRBC 0.0 0.0 - 0.0 /100 WBCs    RBC 3.66 (L) 4.50 - 5.90 x10*6/uL    Hemoglobin 12.1 (L) 13.5 - 17.5 g/dL    Hematocrit 35.8 (L) 41.0 - 52.0 %    MCV 98 80 - 100 fL    MCH 33.1 26.0 - 34.0 pg    MCHC 33.8 32.0 - 36.0 g/dL    RDW 13.4 11.5 - 14.5 %    Platelets 119 (L) 150 - 450 x10*3/uL   Magnesium   Result Value Ref Range    Magnesium 1.94 1.60 - 2.40 mg/dL   Basic metabolic panel   Result Value Ref Range    Glucose 99 74 - 99 mg/dL    Sodium 139 136 - 145 mmol/L    Potassium 4.2 3.5 - 5.3 mmol/L    Chloride 102 98 - 107 mmol/L    Bicarbonate 33 (H) 21 - 32 mmol/L    Anion Gap 8 (L) 10 - 20 mmol/L    Urea Nitrogen 17 6 - 23 mg/dL    Creatinine 0.66 0.50 - 1.30 mg/dL    eGFR >90 >60 mL/min/1.73m*2    Calcium 8.7 8.6 - 10.3 mg/dL                    Assessment/Plan   Principal Problem:    Fall, initial encounter    Patient is an 89 y/o male with PMH significant for MDD, duodenal ulcer, HTN, thrombocytopenia, TIA, and paroxysmal Vtach who presented to [facility] on 12/18 as a limited trauma after fall from standing. Pt reportedly found down in the snow by his neighbor. Estimated down time ~30 minutes, positive head strike, pt denies LOC, unwitnessed fall. Pt endorsing Lt pelvic pain and neck pain.      List of Injuries/Significant Findings:  Lt comminuted and displaced pubic rami fx  Pelvic hematoma  Lt scalp laceration  Neck pain and tenderness  Aphasia? History of TIA  Frequent falls, prior syncope  episode     Assessment and Recommendations:  - Concern for stroke or TIA given new aphasia and confusion    > pending MRI head  - Neck pain likely muscular, but point tender at C7    > Continue C-collar at all times for now    > pending MRI neck  - Ortho consulted for pubic fracture     -> Nonop, WBAT  - Multimodal pain control    > Scheduled tylenol, gabapentin, and lidocaine patch    > Avoid NSAIDs given h/o duodenal ulcer    > PRN morphine for mod-severe pain  - Outpatient falls clinic referral   - Hold DVT chemoprophylaxis given evidence of active bleeding on CT  - DVT ppx with SCDs only  - PT/OT, pending eval    Patient seen and discussed with Dr. Romero, plan as above.        I spent 15 minutes in the professional and overall care of this patient.      Kimberlee Godinez, APRN-CNP

## 2023-12-19 NOTE — H&P
"Hospital Medicine History & Physical    Patient Name: Colton Jiménez   YOB: 1935    Subjective:    Colton Jiménez is a 88 y.o. male who presents to the hospital with complaints of fall. Patient states he went out to check his mail and slipped on the driveway. He did strike his head during the fall. He denies LOC. He states he has generally felt well before the fall. His COVID 19 PCR is positive. Looking at the chart looks like he had a symptomatic COVID 19 infection a few weeks ago and was treated with Paxlovid. Patient himself does not recall this but is clearly documented by his PCP.     ED work up showed fractures of the L pubic rami along with a small hematoma. Patient was hemodynamically stable. He was noted to have some word finding difficultly by other providers, I did not note this during my interaction.     A 10 point ROS was completed and is negative expect as stated in HPI.     Past Medical History:  HTN  HLD  Depression/Anxiety   Hx TIA  Hx pVT     Past Surgical History:  Cholecystectomy   EGD  Lumbar Fusion  Tonsillectomy   Basal Cell excision     Social History: Tobacco - Never, Alcohol - social drinker, Recreational Drugs - none    Family History: family history is not on file.    Objective:    BP (!) 142/92   Pulse 70   Temp 36.2 °C (97.2 °F)   Resp 16   Ht 1.753 m (5' 9\")   Wt 68 kg (150 lb)   SpO2 100%   BMI 22.15 kg/m²     Physical Exam:    GENERAL: In no apparent distress. Alert and cooperative.  HEENT: L eyebrow abrasion. Wearing a C-spine collar.   CARDIOVASCULAR: Regular rate and rhythm.  No murmurs, rubs, or gallops. S1/S2.  RESPIRATORY: Clear to auscultation b/l. No wheezes, rales or rhonchi. Normal effort.   ABDOMEN: Soft, non-tender. Not distended. No rebound or guarding.  MUSCULOSKELETAL: L pelvic pain  EXTREMITIES: No peripheral edema.  NEUROLOGICAL: A&O X 2. Able to relay tonight's events but confused on historical questioning. No focalizing deficits.   SKIN: Warm and " dry, no lesions, no rashes.  PSYCH: Appropriate mood and affect.      Assessment/Plan:    Mechanical Fall  Pubic Rami Fracture w/ hematoma 2/2 above   L scalp laceration 2/2 above   ? of aphasia   COVID 19 + pcr - false positive   HTN  HLD  Depression/Anxiety   Hx TIA  Hx pVT     Bedrest, C-collar, pain control, trend H&H, hold blood thinners. Trauma and Ortho consults. PT/OT once cleared by both services  With reports of aphasia and hx TIA will go ahead with MRI/MRA head and neck. Neurochecks. Daily ASA once H&H stability established   No specific interventions needed for positive COVID 19 PCR   Reconcile home meds once list is available         DVT Prophylaxis: SCDs only for now given above issues  Code Status: FULL CODE assumed, cannot discuss with patient in his current condition   Disposition: admit       Frankie Steven, DO  Hospital Medicine

## 2023-12-19 NOTE — PROGRESS NOTES
SW attempted to see patient but he appears confused at this time. Patient is from home with wife who has dementia. Wife is also in the ED. Patients daughter would like wife placed in a nursing home while patient is in the hospice. Daughter Vivienne [phone]. SW left voicemail for her to call this  back.  will continue to follow. Message with questions.  TIMI Montilla

## 2023-12-19 NOTE — CONSULTS
Consults    Reason For Consult  Concern for dysarthria    History Of Present Illness  Colton Jiménez is a 88 y.o. male with history of hypertension, hyperlipidemia, depression/anxiety, TIA, paroxysmal V. tach presenting with complaints of fall. He was out to check his mail and slipped on the driveway, did not strike his head, did not lose consciousness, had felt generally well prior to fall. Is COVID-positive had COVID few weeks ago and was treated with Paxlovid.    Has sustained left pubic rami fracture with small hematoma. Has had some difficulty with word finding by other providers, however not by primary team.     CT head with no acute intracranial injury. MRI/MRA head and neck pending.     Neurology consulted for new dysarthria.  During time of my examination, patient has difficulty stating words such as blue, misha and repeating more complicated sentences, however does not have difficulty with word searching during regular conversation.  States did not have this issue prior to his fall yesterday.  The patient states that he has had progressive difficulties with ambulation and in the past was diagnosed with cervical stenosis.  He did meet with a surgeon at [facility] but never had any surgery done.    -Medical history: As above  -Surgical history: Cholecystectomy, EGD, lumbar fusion, tonsillectomy, basal cell excision  -Social history: Never smoker, no alcohol or illicit drug use    10 systems reviewed and negative except otherwise noted in HPI above.      Physical Exam     Last Recorded Vitals  /67   Pulse 56   Temp 36.2 °C (97.2 °F)   Resp 16   Wt 68 kg (150 lb)   SpO2 96%      The patient is a well developed, [well-nourished] [male] in no acute distress.  The patient is currently in a c-collar.    The patient's funduscopic examination shows no papilledema bilaterally.    The patient's extremity examination shows that the pulses are 2+ in the upper and lower extremities bilaterally and there is no edema in  the lower extremities bilaterally.    The patient's mental status testing is alert and oriented ×3 with no evidence of aphasia or dysarthria.  The patient's memory testing, fund of knowledge and concentration are all within normal limits.  The patient's cranial nerves 2, 3, 4, 5, 6, 7, 8, 9, 10, 11 and 12 are all within normal limits.  The patient's motor testing shows normal tone, bulk, and power in the upper bilaterally and and 5-/5 power in the lower extremities bilaterally with exception that the proximal left lower extremity strength was limited secondary to the patient's recent fracture.    The patient's sensory testing is intact to light touch in the upper and lower extremities bilaterally.  The patient's cerebellar testing is intact in the upper and lower extremities bilaterally.  The patient's station and gait were not tested as the patient cannot stand.  The patient's reflexes are 1+ in the upper and lower extremities and symmetrical.    Scheduled medications  acetaminophen, 975 mg, oral, q8h  amLODIPine, 2.5 mg, oral, Daily  aspirin, 81 mg, oral, Daily  atorvastatin, 20 mg, oral, Daily  finasteride, 5 mg, oral, Daily  fluticasone, 2 spray, Each Nostril, Daily  gabapentin, 300 mg, oral, TID  lidocaine, 1 patch, transdermal, Daily  magnesium oxide, 400 mg, oral, Daily  morphine, 4 mg, intravenous, Once  sertraline, 100 mg, oral, Daily      Continuous medications     PRN medications  PRN medications: morphine, morphine, ondansetron   Results for orders placed or performed during the hospital encounter of 12/18/23 (from the past 96 hour(s))   POCT GLUCOSE   Result Value Ref Range    POCT Glucose 91 74 - 99 mg/dL   CBC and Auto Differential   Result Value Ref Range    WBC 4.1 (L) 4.4 - 11.3 x10*3/uL    nRBC 0.0 0.0 - 0.0 /100 WBCs    RBC 3.86 (L) 4.50 - 5.90 x10*6/uL    Hemoglobin 12.6 (L) 13.5 - 17.5 g/dL    Hematocrit 37.7 (L) 41.0 - 52.0 %    MCV 98 80 - 100 fL    MCH 32.6 26.0 - 34.0 pg    MCHC 33.4 32.0 -  36.0 g/dL    RDW 13.6 11.5 - 14.5 %    Platelets 147 (L) 150 - 450 x10*3/uL    Neutrophils % 51.0 40.0 - 80.0 %    Immature Granulocytes %, Automated 0.7 0.0 - 0.9 %    Lymphocytes % 33.3 13.0 - 44.0 %    Monocytes % 11.6 2.0 - 10.0 %    Eosinophils % 2.7 0.0 - 6.0 %    Basophils % 0.7 0.0 - 2.0 %    Neutrophils Absolute 2.07 1.60 - 5.50 x10*3/uL    Immature Granulocytes Absolute, Automated 0.03 0.00 - 0.50 x10*3/uL    Lymphocytes Absolute 1.35 0.80 - 3.00 x10*3/uL    Monocytes Absolute 0.47 0.05 - 0.80 x10*3/uL    Eosinophils Absolute 0.11 0.00 - 0.40 x10*3/uL    Basophils Absolute 0.03 0.00 - 0.10 x10*3/uL   Comprehensive Metabolic Panel   Result Value Ref Range    Glucose 90 74 - 99 mg/dL    Sodium 140 136 - 145 mmol/L    Potassium 3.7 3.5 - 5.3 mmol/L    Chloride 103 98 - 107 mmol/L    Bicarbonate 32 21 - 32 mmol/L    Anion Gap 9 (L) 10 - 20 mmol/L    Urea Nitrogen 20 6 - 23 mg/dL    Creatinine 0.83 0.50 - 1.30 mg/dL    eGFR 85 >60 mL/min/1.73m*2    Calcium 9.1 8.6 - 10.3 mg/dL    Albumin 4.1 3.4 - 5.0 g/dL    Alkaline Phosphatase 61 33 - 136 U/L    Total Protein 7.0 6.4 - 8.2 g/dL    AST 19 9 - 39 U/L    Bilirubin, Total 1.1 0.0 - 1.2 mg/dL    ALT 14 10 - 52 U/L   Lactate   Result Value Ref Range    Lactate 1.9 0.4 - 2.0 mmol/L   Protime-INR   Result Value Ref Range    Protime 13.1 (H) 9.8 - 12.8 seconds    INR 1.2 (H) 0.9 - 1.1   Type And Screen   Result Value Ref Range    ABO TYPE O     Rh TYPE POS     ANTIBODY SCREEN NEG    Creatine Kinase   Result Value Ref Range    Creatine Kinase 89 0 - 325 U/L   Lipid Panel   Result Value Ref Range    Cholesterol 150 0 - 199 mg/dL    HDL-Cholesterol 74.1 mg/dL    Cholesterol/HDL Ratio 2.0     LDL Calculated 48 <=99 mg/dL    VLDL 28 0 - 40 mg/dL    Triglycerides 141 0 - 149 mg/dL    Non HDL Cholesterol 76 0 - 149 mg/dL   SARS-CoV-2 RT PCR   Result Value Ref Range    Coronavirus 2019, PCR Detected (A) Not Detected   Hemoglobin and hematocrit, blood   Result Value Ref Range     Hemoglobin 12.2 (L) 13.5 - 17.5 g/dL    Hematocrit 36.4 (L) 41.0 - 52.0 %   CBC   Result Value Ref Range    WBC 5.1 4.4 - 11.3 x10*3/uL    nRBC 0.0 0.0 - 0.0 /100 WBCs    RBC 3.66 (L) 4.50 - 5.90 x10*6/uL    Hemoglobin 12.1 (L) 13.5 - 17.5 g/dL    Hematocrit 35.8 (L) 41.0 - 52.0 %    MCV 98 80 - 100 fL    MCH 33.1 26.0 - 34.0 pg    MCHC 33.8 32.0 - 36.0 g/dL    RDW 13.4 11.5 - 14.5 %    Platelets 119 (L) 150 - 450 x10*3/uL   Magnesium   Result Value Ref Range    Magnesium 1.94 1.60 - 2.40 mg/dL   Basic metabolic panel   Result Value Ref Range    Glucose 99 74 - 99 mg/dL    Sodium 139 136 - 145 mmol/L    Potassium 4.2 3.5 - 5.3 mmol/L    Chloride 102 98 - 107 mmol/L    Bicarbonate 33 (H) 21 - 32 mmol/L    Anion Gap 8 (L) 10 - 20 mmol/L    Urea Nitrogen 17 6 - 23 mg/dL    Creatinine 0.66 0.50 - 1.30 mg/dL    eGFR >90 >60 mL/min/1.73m*2    Calcium 8.7 8.6 - 10.3 mg/dL      MR cervical spine wo IV contrast    Result Date: 12/19/2023  Interpreted By:  Lemuel Ruth, STUDY: MR CERVICAL SPINE WO IV CONTRAST;  12/19/2023 3:06 pm   INDICATION: Signs/Symptoms:Point tendernss, negative CT.   COMPARISON: None.   ACCESSION NUMBER(S): [id number]   ORDERING CLINICIAN: RICHARD HERNANDEZ   TECHNIQUE: Sagittal T1, T2, STIR, axial T1 and axial T2 weighted images were acquired through the cervical spine.   FINDINGS: The C5 and C6 vertebral bodies are fused.   3-4 mm anterolisthesis is noted at C7-T1, T1-2 and T2-3.   The central spinal cord has an approximately 1 cm length of hyperintensity consistent with edema/myelomalacia centered at a central C4-5 disc protrusion and endplate spurring. The spinal canal is severely stenosed at this level with deformity of the cord in the midline.   Otherwise no intrinsic abnormalities of the spinal cord are noted.   Craniocervical junction: No mass of the foramen magnum is noted. The atlanto odontoid articulation has moderate degenerative changes.   C2-3: The facet joints are moderately  arthritic more so on the right. The disc bulges minimally with no significant stenosis.   C3-4: The disc bulges minimally with no significant stenosis. The facet joints are moderately arthritic worse on the right.   C4-5: The spinal canal, lateral recesses and to a lesser degree neural foramina are severely stenosed secondary to central disc protrusion and uncovertebral spurring. The spinal cord is severely deformed centrally by the protrusion and endplate spurring.   C5-6: The spinal canal is mildly to moderately stenosed by disc bulge and uncovertebral spurring abutting and slightly flattening the cord. A large amount of CSF lies posterior to the cord at this level however.   C6-7: The disc bulges mildly with no significant stenosis.   C7-T1: No stenosis is noted.   T1-2: No stenosis is noted on the sagittal images.   T2-3: The neural foramina are mildly stenosed by facet hypertrophy on sagittal images bilaterally.   T3-4 and T4-5: No stenoses are noted on the sagittal images.         The spinal cord has edema/myelomalacia centered at the disc protrusion and endplate spurring at C4-5 compressing the cord and severely stenosing the spinal canal, lateral recesses and foramina.   The C5 and C6 vertebral bodies are fused.   Additional degenerative changes elsewhere as noted.   MACRO: None   Signed by: Lemuel Ruth 12/19/2023 3:35 PM Dictation workstation:   [id number]    MR brain wo IV contrast    Result Date: 12/19/2023  Interpreted By:  Lemuel Ruth, STUDY: MR BRAIN WO IV CONTRAST; MR ANGIO NECK WO IV CONTRAST; MR ANGIO HEAD WO IV CONTRAST;  12/19/2023 3:06 pm   INDICATION: Signs/Symptoms:TIA; Signs/Symptoms:tia.  Stroke protocol. Trauma to head, fell on ice striking head. Intermittent difficulty finding words. Questionable aphasia.   COMPARISON: 12/18/2023 head CT   ACCESSION NUMBER(S): [id number]; [id number]; [id number]   ORDERING CLINICIAN: BRISSA ENAMORADO   TECHNIQUE: Axial T2, FLAIR, DWI, gradient echo T2  and  sagittal and coronal T1 weighted images of brain were acquired.   Time-of-flight MRA of the head  and neck was performed. The images were reviewed as source images and maximum intensity projections. Carotid stenoses were determined using modified NASCET criteria.   FINDINGS: Brain:   CSF Spaces: The sulci and ventricles are normal for the patient's age similar to the prior exam.   Parenchyma: No acute infarct, hemorrhage or mass is noted.   The white matter, thalami and justice have focal to confluent nonspecific FLAIR hyperintensities corresponding to lucencies on the prior CT. The basal ganglia have numerous T2 hyperintensities related to dilated perivascular spaces, also similar to the prior exam.   Paranasal Sinuses and Mastoids: Visualized paranasal sinuses and mastoid air cells are unremarkable.   MRA of head:   Anterior circulation:    There is expected flow signal in bilateral intracranial internal carotid arteries, bilateral carotid terminals, bilateral proximal anterior and middle cerebral arteries.   Posterior circulation:    Bilateral intracranial vertebral arteries, vertebrobasilar junction, basilar artery and proximal posterior cerebral arteries demonstrate expected flow signal.   MRA of neck:   The source images are mildly degraded by artifact.   Right carotid vessels:  There is expected flow signal in the visualized portion of the common carotid artery.  There is mild attenuation of flow signal at the carotid bifurcation which may be secondary to flow related artifact. The right internal carotid bulb has less than 10% stenosis.   Left carotid vessels:   There is expected flow signal in the visualized portion of the common carotid artery.  There is mild attenuation of flow signal at the carotid bifurcation which may be secondary to flow related artifact. The left internal carotid bulb has less than 10% stenosis.   Vertebral vessels:   The visualized segments of the cervical vertebral arteries  demonstrate expected flow signal.       MRI Brain:   No acute infarct, hemorrhage or mass is noted.   The parenchymal hyperintensities are nonspecific and may be secondary to chronic microvascular ischemic changes among others.   MRA:   No evidence of major vessel cutoff or significant stenosis on MRA head and neck.   MACRO: None   Signed by: Lemuel Ruth 12/19/2023 3:30 PM Dictation workstation:   [id number]    MR angio neck wo IV contrast    Result Date: 12/19/2023  Interpreted By:  Lemuel Ruth, STUDY: MR BRAIN WO IV CONTRAST; MR ANGIO NECK WO IV CONTRAST; MR ANGIO HEAD WO IV CONTRAST;  12/19/2023 3:06 pm   INDICATION: Signs/Symptoms:TIA; Signs/Symptoms:tia.  Stroke protocol. Trauma to head, fell on ice striking head. Intermittent difficulty finding words. Questionable aphasia.   COMPARISON: 12/18/2023 head CT   ACCESSION NUMBER(S): [id number]; [id number]; [id number]   ORDERING CLINICIAN: BRISSA ENAMORADO   TECHNIQUE: Axial T2, FLAIR, DWI, gradient echo T2 and  sagittal and coronal T1 weighted images of brain were acquired.   Time-of-flight MRA of the head  and neck was performed. The images were reviewed as source images and maximum intensity projections. Carotid stenoses were determined using modified NASCET criteria.   FINDINGS: Brain:   CSF Spaces: The sulci and ventricles are normal for the patient's age similar to the prior exam.   Parenchyma: No acute infarct, hemorrhage or mass is noted.   The white matter, thalami and justice have focal to confluent nonspecific FLAIR hyperintensities corresponding to lucencies on the prior CT. The basal ganglia have numerous T2 hyperintensities related to dilated perivascular spaces, also similar to the prior exam.   Paranasal Sinuses and Mastoids: Visualized paranasal sinuses and mastoid air cells are unremarkable.   MRA of head:   Anterior circulation:    There is expected flow signal in bilateral intracranial internal carotid arteries, bilateral carotid terminals,  bilateral proximal anterior and middle cerebral arteries.   Posterior circulation:    Bilateral intracranial vertebral arteries, vertebrobasilar junction, basilar artery and proximal posterior cerebral arteries demonstrate expected flow signal.   MRA of neck:   The source images are mildly degraded by artifact.   Right carotid vessels:  There is expected flow signal in the visualized portion of the common carotid artery.  There is mild attenuation of flow signal at the carotid bifurcation which may be secondary to flow related artifact. The right internal carotid bulb has less than 10% stenosis.   Left carotid vessels:   There is expected flow signal in the visualized portion of the common carotid artery.  There is mild attenuation of flow signal at the carotid bifurcation which may be secondary to flow related artifact. The left internal carotid bulb has less than 10% stenosis.   Vertebral vessels:   The visualized segments of the cervical vertebral arteries demonstrate expected flow signal.       MRI Brain:   No acute infarct, hemorrhage or mass is noted.   The parenchymal hyperintensities are nonspecific and may be secondary to chronic microvascular ischemic changes among others.   MRA:   No evidence of major vessel cutoff or significant stenosis on MRA head and neck.   MACRO: None   Signed by: Lemuel Ruth 12/19/2023 3:30 PM Dictation workstation:   [id number]    MR angio head wo IV contrast    Result Date: 12/19/2023  Interpreted By:  Lemuel Ruth, STUDY: MR BRAIN WO IV CONTRAST; MR ANGIO NECK WO IV CONTRAST; MR ANGIO HEAD WO IV CONTRAST;  12/19/2023 3:06 pm   INDICATION: Signs/Symptoms:TIA; Signs/Symptoms:tia.  Stroke protocol. Trauma to head, fell on ice striking head. Intermittent difficulty finding words. Questionable aphasia.   COMPARISON: 12/18/2023 head CT   ACCESSION NUMBER(S): [id number]; [id number]; [id number]   ORDERING CLINICIAN: BRISSA ENAMORADO   TECHNIQUE: Axial T2, FLAIR, DWI, gradient echo T2  demonstrate expected flow signal.       MRI Brain:   No acute infarct, hemorrhage or mass is noted.   The parenchymal hyperintensities are nonspecific and may be secondary to chronic microvascular ischemic changes among others.   MRA:   No evidence of major vessel cutoff or significant stenosis on MRA head and neck.   MACRO: None   Signed by: Lemuel Ruth 12/19/2023 3:30 PM Dictation workstation:   [id number]    CT thoracic spine wo IV contrast    Result Date: 12/18/2023  Interpreted By:  Minor Reddy, STUDY: CT CHEST ABDOMEN PELVIS W IV CONTRAST; CT LUMBAR SPINE WO IV CONTRAST; CT THORACIC SPINE WO IV CONTRAST;  12/18/2023 6:24 pm   INDICATION: Signs/Symptoms:Trauma; Signs/Symptoms:fall.   COMPARISON: CT abdomen pelvis 06/15/2021   ACCESSION NUMBER(S): [id number]; [id number]; [id number]   ORDERING CLINICIAN: AKSHAT PIKE   TECHNIQUE: CT of the chest, abdomen, and pelvis was performed.  Contiguous axial images were obtained at 3 mm slice thickness through the chest, abdomen and pelvis. Coronal and sagittal reconstructions at 3 mm slice thickness were performed. Multiplanar reconstructions were processed of the thoracic and lumbar spine on a separate workstation. 70 ml of contrast  were administered intravenously without immediate complication.   FINDINGS: CHEST:   LUNG/PLEURA/LARGE AIRWAYS: No endobronchial lesion is seen.   Minimal dependent atelectatic change of the left lower lobe. Subcentimeter calcified nodule the right lower lobe compatible with old granulomatous disease.   No pulmonary contusion.   VESSELS: No evidence of thoracic aortic injury. No evidence of thoracic   The main pulmonary artery and its branches are unremarkable with respect course, caliber, and contour   No significant coronary atherosclerotic calcifications are seen.   HEART: Heart size within normal limits. No pericardial effusion.   MEDIASTINUM AND MARILIA: No pathologic enlarged mediastinal lymph nodes by CT criteria.  Subcentimeter mediastinal lymph nodes are noted, nonspecific and possibly reactive.   CHEST WALL AND LOWER NECK: No acute osseous abnormality. Multilevel presumed degenerative changes throughout the imaged spine. No acute soft tissue abnormality.   Thoracic spine:   No acute displaced fracture.Multilevel bridging anterior osteophyte formation suggestive of diffuse idiopathic skeletal hyperostosis. Please note this predisposes the patient to increased risk of injury due to relatively minor trauma and there is a level of focal point tenderness, MRI may be performed for further assessment of occult injury.   ABDOMEN:   LIVER: No evidence of acute injury.   BILE DUCTS: Nonspecific pneumobilia, possibly related to prior procedure.   GALLBLADDER: No calcified gallstones.   PANCREAS: Unremarkable.   SPLEEN: No evidence of acute injury.   ADRENAL GLANDS: No evidence of acute injury.   KIDNEYS AND URETERS: No evidence of renal laceration or contusion. Nonobstructing bilateral nephrolithiasis. No obstructing urolithiasis.   PELVIS:   BLADDER: Decompressed and partially effaced on the left due to space of rates hematoma.   REPRODUCTIVE ORGANS: Prostate is enlarged at 5.6 x 4.8 cm.   BOWEL: No secondary signs of bowel wall injury. No pathologic distension of visualized large or small bowel.     VESSELS: There is no aneurysmal dilatation of the abdominal aorta. No evidence of abdominal aortic injury.   PERITONEUM/RETROPERITONEUM/LYMPH NODES: No free intraperitoneal gas. No enlarged mesenteric lymph nodes.   BONE AND SOFT TISSUE: There is a comminuted fracture of the left superior inferior pubic rami. There is associated left-sided space of Retzius hematoma with a small region of density (series 602, image 44) on the arterial phase exam which increases in density and conspicuity on the portal venous phase delay (series 702 image 44) suggestive of a small focus of active extravasation/active bleeding. This measures up to 0.6 x 0.3  cm on the arterial phase imaging and 1.4 x 0.5 cm on the portal venous phase imaging. .   Lumbar spine:   No acute displaced fracture.L4-S1 posterior fusion which appears similar to prior comparison imaging. Multilevel disc space narrowing. Multilevel facet arthropathy. Multilevel anterior osteophyte formation. Multilevel vacuum disc phenomena.       CHEST: 1.  No CT evidence of acute intrathoracic injury. 2. No CT evidence of acute thoracic spine injury.   ABDOMEN-PELVIS: 1.  Acute, comminuted fractures of the left superior inferior pubic ramus with associated space of right sees hematoma along the left. There is a small region of density as described above that increases in conspicuity between the arterial and portal venous phase imaging compatible with a small focus of active bleeding within the region immediately superior to the left superior pubic ramus. 2. No acute osseous abnormality detected of the lumbar spine. 3. No CT evidence of acute solid organ injury.     MACRO: Minor Reddy discussed the significance and urgency of this critical finding via epic secure chat with  AKSHAT PIKE on 12/18/2023 at 7:09 pm.  (**-RCF-**) Findings:  See findings.     Signed by: Minor Reddy 12/18/2023 7:10 PM Dictation workstation:   [id number]    CT lumbar spine wo IV contrast    Result Date: 12/18/2023  Interpreted By:  Minor Reddy, STUDY: CT CHEST ABDOMEN PELVIS W IV CONTRAST; CT LUMBAR SPINE WO IV CONTRAST; CT THORACIC SPINE WO IV CONTRAST;  12/18/2023 6:24 pm   INDICATION: Signs/Symptoms:Trauma; Signs/Symptoms:fall.   COMPARISON: CT abdomen pelvis 06/15/2021   ACCESSION NUMBER(S): [id number]; [id number]; [id number]   ORDERING CLINICIAN: AKSHAT PIKE   TECHNIQUE: CT of the chest, abdomen, and pelvis was performed.  Contiguous axial images were obtained at 3 mm slice thickness through the chest, abdomen and pelvis. Coronal and sagittal reconstructions at 3 mm slice thickness were performed. Multiplanar  reconstructions were processed of the thoracic and lumbar spine on a separate workstation. 70 ml of contrast  were administered intravenously without immediate complication.   FINDINGS: CHEST:   LUNG/PLEURA/LARGE AIRWAYS: No endobronchial lesion is seen.   Minimal dependent atelectatic change of the left lower lobe. Subcentimeter calcified nodule the right lower lobe compatible with old granulomatous disease.   No pulmonary contusion.   VESSELS: No evidence of thoracic aortic injury. No evidence of thoracic   The main pulmonary artery and its branches are unremarkable with respect course, caliber, and contour   No significant coronary atherosclerotic calcifications are seen.   HEART: Heart size within normal limits. No pericardial effusion.   MEDIASTINUM AND MARILIA: No pathologic enlarged mediastinal lymph nodes by CT criteria. Subcentimeter mediastinal lymph nodes are noted, nonspecific and possibly reactive.   CHEST WALL AND LOWER NECK: No acute osseous abnormality. Multilevel presumed degenerative changes throughout the imaged spine. No acute soft tissue abnormality.   Thoracic spine:   No acute displaced fracture.Multilevel bridging anterior osteophyte formation suggestive of diffuse idiopathic skeletal hyperostosis. Please note this predisposes the patient to increased risk of injury due to relatively minor trauma and there is a level of focal point tenderness, MRI may be performed for further assessment of occult injury.   ABDOMEN:   LIVER: No evidence of acute injury.   BILE DUCTS: Nonspecific pneumobilia, possibly related to prior procedure.   GALLBLADDER: No calcified gallstones.   PANCREAS: Unremarkable.   SPLEEN: No evidence of acute injury.   ADRENAL GLANDS: No evidence of acute injury.   KIDNEYS AND URETERS: No evidence of renal laceration or contusion. Nonobstructing bilateral nephrolithiasis. No obstructing urolithiasis.   PELVIS:   BLADDER: Decompressed and partially effaced on the left due to space of  rates hematoma.   REPRODUCTIVE ORGANS: Prostate is enlarged at 5.6 x 4.8 cm.   BOWEL: No secondary signs of bowel wall injury. No pathologic distension of visualized large or small bowel.     VESSELS: There is no aneurysmal dilatation of the abdominal aorta. No evidence of abdominal aortic injury.   PERITONEUM/RETROPERITONEUM/LYMPH NODES: No free intraperitoneal gas. No enlarged mesenteric lymph nodes.   BONE AND SOFT TISSUE: There is a comminuted fracture of the left superior inferior pubic rami. There is associated left-sided space of Retzius hematoma with a small region of density (series 602, image 44) on the arterial phase exam which increases in density and conspicuity on the portal venous phase delay (series 702 image 44) suggestive of a small focus of active extravasation/active bleeding. This measures up to 0.6 x 0.3 cm on the arterial phase imaging and 1.4 x 0.5 cm on the portal venous phase imaging. .   Lumbar spine:   No acute displaced fracture.L4-S1 posterior fusion which appears similar to prior comparison imaging. Multilevel disc space narrowing. Multilevel facet arthropathy. Multilevel anterior osteophyte formation. Multilevel vacuum disc phenomena.       CHEST: 1.  No CT evidence of acute intrathoracic injury. 2. No CT evidence of acute thoracic spine injury.   ABDOMEN-PELVIS: 1.  Acute, comminuted fractures of the left superior inferior pubic ramus with associated space of right sees hematoma along the left. There is a small region of density as described above that increases in conspicuity between the arterial and portal venous phase imaging compatible with a small focus of active bleeding within the region immediately superior to the left superior pubic ramus. 2. No acute osseous abnormality detected of the lumbar spine. 3. No CT evidence of acute solid organ injury.     MACRO: Minor Reddy discussed the significance and urgency of this critical finding via epic secure chat with  AKSHAT PIKE  on 12/18/2023 at 7:09 pm.  (**-RCF-**) Findings:  See findings.     Signed by: Minor Reddy 12/18/2023 7:10 PM Dictation workstation:   [id number]    CT chest abdomen pelvis w IV contrast    Result Date: 12/18/2023  Interpreted By:  Minor Reddy, STUDY: CT CHEST ABDOMEN PELVIS W IV CONTRAST; CT LUMBAR SPINE WO IV CONTRAST; CT THORACIC SPINE WO IV CONTRAST;  12/18/2023 6:24 pm   INDICATION: Signs/Symptoms:Trauma; Signs/Symptoms:fall.   COMPARISON: CT abdomen pelvis 06/15/2021   ACCESSION NUMBER(S): [id number]; [id number]; [id number]   ORDERING CLINICIAN: AKSHAT PIKE   TECHNIQUE: CT of the chest, abdomen, and pelvis was performed.  Contiguous axial images were obtained at 3 mm slice thickness through the chest, abdomen and pelvis. Coronal and sagittal reconstructions at 3 mm slice thickness were performed. Multiplanar reconstructions were processed of the thoracic and lumbar spine on a separate workstation. 70 ml of contrast  were administered intravenously without immediate complication.   FINDINGS: CHEST:   LUNG/PLEURA/LARGE AIRWAYS: No endobronchial lesion is seen.   Minimal dependent atelectatic change of the left lower lobe. Subcentimeter calcified nodule the right lower lobe compatible with old granulomatous disease.   No pulmonary contusion.   VESSELS: No evidence of thoracic aortic injury. No evidence of thoracic   The main pulmonary artery and its branches are unremarkable with respect course, caliber, and contour   No significant coronary atherosclerotic calcifications are seen.   HEART: Heart size within normal limits. No pericardial effusion.   MEDIASTINUM AND MARILIA: No pathologic enlarged mediastinal lymph nodes by CT criteria. Subcentimeter mediastinal lymph nodes are noted, nonspecific and possibly reactive.   CHEST WALL AND LOWER NECK: No acute osseous abnormality. Multilevel presumed degenerative changes throughout the imaged spine. No acute soft tissue abnormality.   Thoracic spine:   No  acute displaced fracture.Multilevel bridging anterior osteophyte formation suggestive of diffuse idiopathic skeletal hyperostosis. Please note this predisposes the patient to increased risk of injury due to relatively minor trauma and there is a level of focal point tenderness, MRI may be performed for further assessment of occult injury.   ABDOMEN:   LIVER: No evidence of acute injury.   BILE DUCTS: Nonspecific pneumobilia, possibly related to prior procedure.   GALLBLADDER: No calcified gallstones.   PANCREAS: Unremarkable.   SPLEEN: No evidence of acute injury.   ADRENAL GLANDS: No evidence of acute injury.   KIDNEYS AND URETERS: No evidence of renal laceration or contusion. Nonobstructing bilateral nephrolithiasis. No obstructing urolithiasis.   PELVIS:   BLADDER: Decompressed and partially effaced on the left due to space of rates hematoma.   REPRODUCTIVE ORGANS: Prostate is enlarged at 5.6 x 4.8 cm.   BOWEL: No secondary signs of bowel wall injury. No pathologic distension of visualized large or small bowel.     VESSELS: There is no aneurysmal dilatation of the abdominal aorta. No evidence of abdominal aortic injury.   PERITONEUM/RETROPERITONEUM/LYMPH NODES: No free intraperitoneal gas. No enlarged mesenteric lymph nodes.   BONE AND SOFT TISSUE: There is a comminuted fracture of the left superior inferior pubic rami. There is associated left-sided space of Retzius hematoma with a small region of density (series 602, image 44) on the arterial phase exam which increases in density and conspicuity on the portal venous phase delay (series 702 image 44) suggestive of a small focus of active extravasation/active bleeding. This measures up to 0.6 x 0.3 cm on the arterial phase imaging and 1.4 x 0.5 cm on the portal venous phase imaging. .   Lumbar spine:   No acute displaced fracture.L4-S1 posterior fusion which appears similar to prior comparison imaging. Multilevel disc space narrowing. Multilevel facet  arthropathy. Multilevel anterior osteophyte formation. Multilevel vacuum disc phenomena.       CHEST: 1.  No CT evidence of acute intrathoracic injury. 2. No CT evidence of acute thoracic spine injury.   ABDOMEN-PELVIS: 1.  Acute, comminuted fractures of the left superior inferior pubic ramus with associated space of right sees hematoma along the left. There is a small region of density as described above that increases in conspicuity between the arterial and portal venous phase imaging compatible with a small focus of active bleeding within the region immediately superior to the left superior pubic ramus. 2. No acute osseous abnormality detected of the lumbar spine. 3. No CT evidence of acute solid organ injury.     MACRO: Minor Reddy discussed the significance and urgency of this critical finding via epic secure chat with  AKSHAT PIKE on 12/18/2023 at 7:09 pm.  (**-RCF-**) Findings:  See findings.     Signed by: Minor Reddy 12/18/2023 7:10 PM Dictation workstation:   [id number]    CT cervical spine wo IV contrast    Result Date: 12/18/2023  Interpreted By:  Minor Reddy, STUDY: CT CERVICAL SPINE WO IV CONTRAST;  12/18/2023 6:24 pm   INDICATION: Signs/Symptoms:fall.   COMPARISON: None.   ACCESSION NUMBER(S): [id number]   ORDERING CLINICIAN: AKSHAT PIKE   TECHNIQUE: Axial CT images of the cervical spine are obtained. Axial, coronal and sagittal reconstructions are provided for review.   FINDINGS:     Fractures: There is no evidence for an acute fracture of the cervical spine.   Vertebral Alignment: No posttraumatic malalignment is detected.   Craniocervical Junction: The odontoid process and craniocervical junction are intact.   Vertebrae/Disc Spaces:  Multilevel disc space narrowing. Multilevel facet arthropathy Multilevel uncovertebral hypertrophy.Multilevel osteophyte formation.   Prevertebral/Paraspinal Soft Tissues: The prevertebral and paraspinal soft tissues are unremarkable.          Multilevel spondylosis without acute osseous abnormality of the cervical spine.   MACRO: None   Signed by: Minor Reddy 12/18/2023 6:37 PM Dictation workstation:   [id number]    CT head W O contrast trauma protocol    Result Date: 12/18/2023  Interpreted By:  Minor Reddy, STUDY: CT HEAD W/O CONTRAST TRAUMA PROTOCOL; CT FACIAL BONES WO IV CONTRAST; 12/18/2023 6:24 pm   INDICATION: Signs/Symptoms:head injury, mental status change; Signs/Symptoms:fall, confusion.   COMPARISON: Head CT 02/06/2023   ACCESSION NUMBER(S): [id number]; [id number]   ORDERING CLINICIAN: AKSHAT PIKE   TECHNIQUE: Noncontrast volumetric CT scan of head and facial bones was performed. Angled reformats in brain and bone windows were generated. The images were reviewed in bone, brain, blood and soft tissue windows. Three-dimensional reformations were processed of the facial bones on a separate workstation.   FINDINGS: CSF Spaces: The ventricles, sulci and basal cisterns are within normal limits. There is no extraaxial fluid collection.   Parenchyma:  The grey-white differentiation is intact. There is no mass effect or midline shift.  There is no intracranial hemorrhage.   Calvarium: The calvarium is unremarkable.   Facial bones, paranasal sinuses and mastoids: No acute displaced fracture. Pterygoid plates are intact. Bilateral mandibular condyles are well situated. Orbits appear symmetric without evidence of retrobulbar hematoma.       No CT evidence of acute intracranial injury.   No CT evidence of acute displaced facial bone fracture.   MACRO: None     Signed by: Minor Reddy 12/18/2023 6:34 PM Dictation workstation:   [id number]    CT maxillofacial bones wo IV contrast    Result Date: 12/18/2023  Interpreted By:  Minor Reddy, STUDY: CT HEAD W/O CONTRAST TRAUMA PROTOCOL; CT FACIAL BONES WO IV CONTRAST; 12/18/2023 6:24 pm   INDICATION: Signs/Symptoms:head injury, mental status change; Signs/Symptoms:fall, confusion.    COMPARISON: Head CT 02/06/2023   ACCESSION NUMBER(S): [id number]; [id number]   ORDERING CLINICIAN: AKSHAT PIKE   TECHNIQUE: Noncontrast volumetric CT scan of head and facial bones was performed. Angled reformats in brain and bone windows were generated. The images were reviewed in bone, brain, blood and soft tissue windows. Three-dimensional reformations were processed of the facial bones on a separate workstation.   FINDINGS: CSF Spaces: The ventricles, sulci and basal cisterns are within normal limits. There is no extraaxial fluid collection.   Parenchyma:  The grey-white differentiation is intact. There is no mass effect or midline shift.  There is no intracranial hemorrhage.   Calvarium: The calvarium is unremarkable.   Facial bones, paranasal sinuses and mastoids: No acute displaced fracture. Pterygoid plates are intact. Bilateral mandibular condyles are well situated. Orbits appear symmetric without evidence of retrobulbar hematoma.       No CT evidence of acute intracranial injury.   No CT evidence of acute displaced facial bone fracture.   MACRO: None     Signed by: Minor Reddy 12/18/2023 6:34 PM Dictation workstation:   [id number]        Assessment/Plan   Impression: The patient is an 88-year-old male with a history of multiple stroke risk factors who fell.  The patient suffered a superior pubic rami fracture.  His neurological examination is abnormal and noted above.  The differential diagnosis for the patient's lower extremity weakness includes cervical stenosis, lumbar stenosis, polyneuropathy and multiple lumbosacral radiculopathies.  The patient does have severe cervical stenosis noted on the MRI of the cervical spine.    Plan: The patient needs an orthopedic surgery consult for his fracture and also a neurosurgical consult for his cervical stenosis.  I would certainly continue his c-collar until seen by neurosurgery.  The patient needs to continue stroke risk factor modification.   The  patient needs a PT, OT, social service, rehab and speech therapy consult.  The patient needs DVT prophylaxis.  The patient needs neurochecks as per protocol.  I will send the note to Dr. Coombs.  Thank you very much for sending me this very interesting consultation.  I discussed all these issues in detail with the patient and and his family and answered all their questions.  I did review the neuroimaging with the patient's family.  I will continue to follow the patient while they are in the hospital.  The patient needs follow-up with their primary care doctor within 2 weeks of discharge.  On discharge, the patient will follow up with me in the office in 4 months.    Angela Steven,

## 2023-12-19 NOTE — CONSULTS
ORTHOPAEDIC SURGERY CONSULT NOTE    Patient Name: Colton Jiménez  MRN: [id number]  Admission Date: 12/18/2023  Date of Evaluation:  December 19, 2023  Time of Evaluation: 9:32 AM      Reason for Consult: Pubic rami fractures  Primary Care Physician: Elvie Hugo, APRN-CNP    IMPRESSION  1.  Left superior and inferior pubic rami fractures    PLAN  Admission status: Admitted to primary team.  Test(s)/Imaging: CT scan reviewed.  Intervention: None.  PT/OT: WBAT LLE.  Diet: Per primary.   Pain Control: Per primary  DVT Prophylaxis: Per primary.  Disposition: No acute orthopedic intervention necessary at this time. Recommend symptomatic pain control. The expected course of healing was discussed with the patient. Patient will need to follow up in clinic in 10-14 days.     SUBJECTIVE  Mr. Jiménez is a 88 y.o. male with a history of TIA, pVT, HTN, HLD, lumbar fusion who presents for left hip pain. Patient reports fall at home outside in his driveway yesterday, was down for about 30 minutes, found by neighbor.  He denies LOC.  Patient denies low back pain, numbness/tingling of the affected leg, or injury to other extremities. Patient denies use of bisphosphonates, chronic steroids or anticoagulation.  Ambulates without assistive device at baseline.      Past Medical History:   Past Medical History:   Diagnosis Date    Personal history of malignant neoplasm, unspecified     History of malignant neoplasm    Personal history of other diseases of the circulatory system     History of hypertension    Personal history of other diseases of the digestive system     History of diverticulitis    Personal history of other diseases of the digestive system     History of pancreatitis    Personal history of other diseases of the musculoskeletal system and connective tissue     History of arthritis    Personal history of other diseases of the musculoskeletal system and connective tissue     History of back pain    Personal history of  "other specified conditions     History of abdominal pain    Personal history of other specified conditions     History of headache     Past Surgical History:   Past Surgical History:   Procedure Laterality Date    CATARACT EXTRACTION  09/11/2017    Cataract Surgery    CHOLECYSTECTOMY  09/11/2017    Cholecystectomy    COLONOSCOPY  09/11/2017    Colonoscopy (Fiberoptic)    OTHER SURGICAL HISTORY  09/11/2017    Biopsy Skin     Family History: No family history on file.  Social History:   Social History     Tobacco Use    Smoking status: Unknown     Medications:  Prior to Admission Medications:  (Not in a hospital admission)     Current Allergies:   Allergies as of 12/18/2023    (No Known Allergies)       Complete Review of Systems:    GENERAL: Denies fever or chills.   RESPIRATORY: Denies shortness of breath.   CARDIOVASCULAR: Denies chest pain.   GI: Denies nausea or vomiting.   MUSCULOSKELETAL: See HPI  HEMATOLOGY Denies anticoagulation or hematologic disorder.   NEURO: Denies numbness or tingling.     OBJECTIVE  Physical Exam:   Patient Vitals for the past 24 hrs:   BP Temp Temp src Pulse Resp SpO2 Height Weight   12/19/23 0900 143/67 -- -- 56 -- 96 % -- --   12/19/23 0800 133/64 -- -- 56 -- 96 % -- --   12/19/23 0700 174/81 -- -- 60 -- 99 % -- --   12/19/23 0500 143/70 -- -- 74 16 94 % -- --   12/19/23 0349 137/73 -- -- 67 16 100 % -- --   12/19/23 0225 137/64 -- -- 62 16 93 % -- --   12/19/23 0130 136/67 -- -- 57 16 94 % -- --   12/19/23 0002 158/64 -- -- 51 18 98 % -- --   12/18/23 2217 143/69 -- -- 66 16 94 % -- --   12/18/23 1953 (!) 142/92 -- -- 70 16 100 % -- --   12/18/23 1900 175/86 36.2 °C (97.2 °F) -- 71 17 95 % -- --   12/18/23 1851 -- -- -- -- -- -- 1.753 m (5' 9\") 68 kg (150 lb)   12/18/23 1830 168/81 36.1 °C (97 °F) -- 68 18 96 % -- --   12/18/23 1829 171/79 35.6 °C (96.1 °F) Temporal 91 19 94 % 1.753 m (5' 9\") 68 kg (150 lb)   12/18/23 1815 176/78 36.1 °C (97 °F) -- 63 19 94 % -- --   12/18/23 1800 " 177/84 36 °C (96.8 °F) -- 61 17 94 % -- --   12/18/23 1745 169/80 36 °C (96.8 °F) -- 64 18 93 % -- --   12/18/23 1730 171/79 35.6 °C (96.1 °F) Temporal 91 19 91 % -- --     Body mass index is 22.15 kg/m².  GENERAL: NAD. A&Ox3. Cooperative. Pleasant.   Left hip MSK:  - Leg lengths grossly equal, no rash, ecchymosis, abrasions or lacerations noted over hip  - Wiggles toes; + EHL/TA/TP/GS function intact  - SILT L3-S1; 2+ DP pulse, BCR across all digits   - Compartments soft and compressible   - TTP over groin  Secondary skeletal exam   - No tenderness or obvious deformity to long bones or other major joints    DATA:   Diagnostic tests reviewed for today's visit:      LABORATORY TESTS:    Results for orders placed or performed during the hospital encounter of 12/18/23 (from the past 24 hour(s))   POCT GLUCOSE   Result Value Ref Range    POCT Glucose 91 74 - 99 mg/dL   CBC and Auto Differential   Result Value Ref Range    WBC 4.1 (L) 4.4 - 11.3 x10*3/uL    nRBC 0.0 0.0 - 0.0 /100 WBCs    RBC 3.86 (L) 4.50 - 5.90 x10*6/uL    Hemoglobin 12.6 (L) 13.5 - 17.5 g/dL    Hematocrit 37.7 (L) 41.0 - 52.0 %    MCV 98 80 - 100 fL    MCH 32.6 26.0 - 34.0 pg    MCHC 33.4 32.0 - 36.0 g/dL    RDW 13.6 11.5 - 14.5 %    Platelets 147 (L) 150 - 450 x10*3/uL    Neutrophils % 51.0 40.0 - 80.0 %    Immature Granulocytes %, Automated 0.7 0.0 - 0.9 %    Lymphocytes % 33.3 13.0 - 44.0 %    Monocytes % 11.6 2.0 - 10.0 %    Eosinophils % 2.7 0.0 - 6.0 %    Basophils % 0.7 0.0 - 2.0 %    Neutrophils Absolute 2.07 1.60 - 5.50 x10*3/uL    Immature Granulocytes Absolute, Automated 0.03 0.00 - 0.50 x10*3/uL    Lymphocytes Absolute 1.35 0.80 - 3.00 x10*3/uL    Monocytes Absolute 0.47 0.05 - 0.80 x10*3/uL    Eosinophils Absolute 0.11 0.00 - 0.40 x10*3/uL    Basophils Absolute 0.03 0.00 - 0.10 x10*3/uL   Comprehensive Metabolic Panel   Result Value Ref Range    Glucose 90 74 - 99 mg/dL    Sodium 140 136 - 145 mmol/L    Potassium 3.7 3.5 - 5.3 mmol/L     Chloride 103 98 - 107 mmol/L    Bicarbonate 32 21 - 32 mmol/L    Anion Gap 9 (L) 10 - 20 mmol/L    Urea Nitrogen 20 6 - 23 mg/dL    Creatinine 0.83 0.50 - 1.30 mg/dL    eGFR 85 >60 mL/min/1.73m*2    Calcium 9.1 8.6 - 10.3 mg/dL    Albumin 4.1 3.4 - 5.0 g/dL    Alkaline Phosphatase 61 33 - 136 U/L    Total Protein 7.0 6.4 - 8.2 g/dL    AST 19 9 - 39 U/L    Bilirubin, Total 1.1 0.0 - 1.2 mg/dL    ALT 14 10 - 52 U/L   Lactate   Result Value Ref Range    Lactate 1.9 0.4 - 2.0 mmol/L   Protime-INR   Result Value Ref Range    Protime 13.1 (H) 9.8 - 12.8 seconds    INR 1.2 (H) 0.9 - 1.1   Type And Screen   Result Value Ref Range    ABO TYPE O     Rh TYPE POS     ANTIBODY SCREEN NEG    Creatine Kinase   Result Value Ref Range    Creatine Kinase 89 0 - 325 U/L   Lipid Panel   Result Value Ref Range    Cholesterol 150 0 - 199 mg/dL    HDL-Cholesterol 74.1 mg/dL    Cholesterol/HDL Ratio 2.0     LDL Calculated 48 <=99 mg/dL    VLDL 28 0 - 40 mg/dL    Triglycerides 141 0 - 149 mg/dL    Non HDL Cholesterol 76 0 - 149 mg/dL   SARS-CoV-2 RT PCR   Result Value Ref Range    Coronavirus 2019, PCR Detected (A) Not Detected   Hemoglobin and hematocrit, blood   Result Value Ref Range    Hemoglobin 12.2 (L) 13.5 - 17.5 g/dL    Hematocrit 36.4 (L) 41.0 - 52.0 %   CBC   Result Value Ref Range    WBC 5.1 4.4 - 11.3 x10*3/uL    nRBC 0.0 0.0 - 0.0 /100 WBCs    RBC 3.66 (L) 4.50 - 5.90 x10*6/uL    Hemoglobin 12.1 (L) 13.5 - 17.5 g/dL    Hematocrit 35.8 (L) 41.0 - 52.0 %    MCV 98 80 - 100 fL    MCH 33.1 26.0 - 34.0 pg    MCHC 33.8 32.0 - 36.0 g/dL    RDW 13.4 11.5 - 14.5 %    Platelets 119 (L) 150 - 450 x10*3/uL   Magnesium   Result Value Ref Range    Magnesium 1.94 1.60 - 2.40 mg/dL   Basic metabolic panel   Result Value Ref Range    Glucose 99 74 - 99 mg/dL    Sodium 139 136 - 145 mmol/L    Potassium 4.2 3.5 - 5.3 mmol/L    Chloride 102 98 - 107 mmol/L    Bicarbonate 33 (H) 21 - 32 mmol/L    Anion Gap 8 (L) 10 - 20 mmol/L    Urea Nitrogen 17  6 - 23 mg/dL    Creatinine 0.66 0.50 - 1.30 mg/dL    eGFR >90 >60 mL/min/1.73m*2    Calcium 8.7 8.6 - 10.3 mg/dL           IMAGING TESTS:  PELVIS:      BLADDER:  Decompressed and partially effaced on the left due to space of rates  hematoma.      REPRODUCTIVE ORGANS:  Prostate is enlarged at 5.6 x 4.8 cm.      BOWEL:  No secondary signs of bowel wall injury. No pathologic distension of  visualized large or small bowel.          VESSELS:  There is no aneurysmal dilatation of the abdominal aorta. No evidence  of abdominal aortic injury.      PERITONEUM/RETROPERITONEUM/LYMPH NODES:  No free intraperitoneal gas. No enlarged mesenteric lymph nodes.      BONE AND SOFT TISSUE:  There is a comminuted fracture of the left superior inferior pubic  rami. There is associated left-sided space of Retzius hematoma with a  small region of density (series 602, image 44) on the arterial phase  exam which increases in density and conspicuity on the portal venous  phase delay (series 702 image 44) suggestive of a small focus of  active extravasation/active bleeding. This measures up to 0.6 x 0.3  cm on the arterial phase imaging and 1.4 x 0.5 cm on the portal  venous phase imaging. .      Lumbar spine:      No acute displaced fracture.L4-S1 posterior fusion which appears  similar to prior comparison imaging. Multilevel disc space narrowing.  Multilevel facet arthropathy. Multilevel anterior osteophyte  formation. Multilevel vacuum disc phenomena.      IMPRESSION:  CHEST:  1.  No CT evidence of acute intrathoracic injury.  2. No CT evidence of acute thoracic spine injury.      ABDOMEN-PELVIS:  1.  Acute, comminuted fractures of the left superior inferior pubic  ramus with associated space of right sees hematoma along the left.  There is a small region of density as described above that increases  in conspicuity between the arterial and portal venous phase imaging  compatible with a small focus of active bleeding within the  region  immediately superior to the left superior pubic ramus.  2. No acute osseous abnormality detected of the lumbar spine.  3. No CT evidence of acute solid organ injury.          Thank you for the consult, I appreciate the opportunity to be involved in this patient's care.      Barrett Elam, DO  Orthopaedic Surgery  Sports Medicine & Shoulder  [facility]   [phone]

## 2023-12-20 LAB
ANION GAP SERPL CALC-SCNC: 10 MMOL/L (ref 10–20)
BUN SERPL-MCNC: 23 MG/DL (ref 6–23)
CALCIUM SERPL-MCNC: 8.8 MG/DL (ref 8.6–10.3)
CHLORIDE SERPL-SCNC: 102 MMOL/L (ref 98–107)
CO2 SERPL-SCNC: 31 MMOL/L (ref 21–32)
CREAT SERPL-MCNC: 0.6 MG/DL (ref 0.5–1.3)
ERYTHROCYTE [DISTWIDTH] IN BLOOD BY AUTOMATED COUNT: 13.5 % (ref 11.5–14.5)
GFR SERPL CREATININE-BSD FRML MDRD: >90 ML/MIN/1.73M*2
GLUCOSE SERPL-MCNC: 105 MG/DL (ref 74–99)
HCT VFR BLD AUTO: 36.5 % (ref 41–52)
HGB BLD-MCNC: 11.9 G/DL (ref 13.5–17.5)
MAGNESIUM SERPL-MCNC: 1.98 MG/DL (ref 1.6–2.4)
MCH RBC QN AUTO: 32.2 PG (ref 26–34)
MCHC RBC AUTO-ENTMCNC: 32.6 G/DL (ref 32–36)
MCV RBC AUTO: 99 FL (ref 80–100)
NRBC BLD-RTO: 0 /100 WBCS (ref 0–0)
PLATELET # BLD AUTO: 118 X10*3/UL (ref 150–450)
POTASSIUM SERPL-SCNC: 4.2 MMOL/L (ref 3.5–5.3)
RBC # BLD AUTO: 3.69 X10*6/UL (ref 4.5–5.9)
SODIUM SERPL-SCNC: 139 MMOL/L (ref 136–145)
WBC # BLD AUTO: 5.3 X10*3/UL (ref 4.4–11.3)

## 2023-12-20 PROCEDURE — 99238 HOSP IP/OBS DSCHRG MGMT 30/<: CPT | Performed by: INTERNAL MEDICINE

## 2023-12-20 PROCEDURE — 99231 SBSQ HOSP IP/OBS SF/LOW 25: CPT | Performed by: PSYCHIATRY & NEUROLOGY

## 2023-12-20 PROCEDURE — 97161 PT EVAL LOW COMPLEX 20 MIN: CPT | Mod: GP

## 2023-12-20 PROCEDURE — 85027 COMPLETE CBC AUTOMATED: CPT

## 2023-12-20 PROCEDURE — 2500000001 HC RX 250 WO HCPCS SELF ADMINISTERED DRUGS (ALT 637 FOR MEDICARE OP)

## 2023-12-20 PROCEDURE — 2500000004 HC RX 250 GENERAL PHARMACY W/ HCPCS (ALT 636 FOR OP/ED): Performed by: INTERNAL MEDICINE

## 2023-12-20 PROCEDURE — 36415 COLL VENOUS BLD VENIPUNCTURE: CPT

## 2023-12-20 PROCEDURE — 80048 BASIC METABOLIC PNL TOTAL CA: CPT

## 2023-12-20 PROCEDURE — 2500000004 HC RX 250 GENERAL PHARMACY W/ HCPCS (ALT 636 FOR OP/ED)

## 2023-12-20 PROCEDURE — 99232 SBSQ HOSP IP/OBS MODERATE 35: CPT | Performed by: SURGERY

## 2023-12-20 PROCEDURE — 97165 OT EVAL LOW COMPLEX 30 MIN: CPT | Mod: GO

## 2023-12-20 PROCEDURE — 99232 SBSQ HOSP IP/OBS MODERATE 35: CPT | Performed by: REGISTERED NURSE

## 2023-12-20 PROCEDURE — 83735 ASSAY OF MAGNESIUM: CPT

## 2023-12-20 PROCEDURE — 99221 1ST HOSP IP/OBS SF/LOW 40: CPT | Performed by: NURSE PRACTITIONER

## 2023-12-20 PROCEDURE — 92610 EVALUATE SWALLOWING FUNCTION: CPT | Mod: GN

## 2023-12-20 PROCEDURE — 1200000002 HC GENERAL ROOM WITH TELEMETRY DAILY

## 2023-12-20 PROCEDURE — 2500000005 HC RX 250 GENERAL PHARMACY W/O HCPCS

## 2023-12-20 RX ORDER — DEXAMETHASONE 4 MG/1
4 TABLET ORAL DAILY
Status: DISCONTINUED | OUTPATIENT
Start: 2023-12-20 | End: 2023-12-23 | Stop reason: HOSPADM

## 2023-12-20 RX ORDER — POLYETHYLENE GLYCOL 3350 17 G/17G
17 POWDER, FOR SOLUTION ORAL DAILY
Status: DISCONTINUED | OUTPATIENT
Start: 2023-12-20 | End: 2023-12-23 | Stop reason: HOSPADM

## 2023-12-20 RX ORDER — DOCUSATE SODIUM 100 MG/1
100 CAPSULE, LIQUID FILLED ORAL 2 TIMES DAILY
Status: DISCONTINUED | OUTPATIENT
Start: 2023-12-20 | End: 2023-12-23 | Stop reason: HOSPADM

## 2023-12-20 RX ORDER — TRAMADOL HYDROCHLORIDE 50 MG/1
25 TABLET ORAL EVERY 8 HOURS PRN
Status: DISCONTINUED | OUTPATIENT
Start: 2023-12-20 | End: 2023-12-21

## 2023-12-20 RX ORDER — OXYCODONE HYDROCHLORIDE 5 MG/1
5 TABLET ORAL EVERY 4 HOURS PRN
Status: DISCONTINUED | OUTPATIENT
Start: 2023-12-20 | End: 2023-12-21

## 2023-12-20 RX ORDER — MORPHINE SULFATE 2 MG/ML
2 INJECTION, SOLUTION INTRAMUSCULAR; INTRAVENOUS EVERY 4 HOURS PRN
Status: DISCONTINUED | OUTPATIENT
Start: 2023-12-20 | End: 2023-12-21

## 2023-12-20 RX ADMIN — POLYETHYLENE GLYCOL 3350 17 G: 17 POWDER, FOR SOLUTION ORAL at 11:17

## 2023-12-20 RX ADMIN — ACETAMINOPHEN 975 MG: 325 TABLET ORAL at 05:23

## 2023-12-20 RX ADMIN — MAGNESIUM OXIDE TAB 400 MG (241.3 MG ELEMENTAL MG) 400 MG: 400 (241.3 MG) TAB at 09:00

## 2023-12-20 RX ADMIN — FLUTICASONE PROPIONATE 2 SPRAY: 50 SPRAY, METERED NASAL at 09:02

## 2023-12-20 RX ADMIN — FINASTERIDE 5 MG: 5 TABLET, FILM COATED ORAL at 09:02

## 2023-12-20 RX ADMIN — GABAPENTIN 300 MG: 300 CAPSULE ORAL at 18:36

## 2023-12-20 RX ADMIN — DOCUSATE SODIUM 100 MG: 100 CAPSULE, LIQUID FILLED ORAL at 20:37

## 2023-12-20 RX ADMIN — ACETAMINOPHEN 975 MG: 325 TABLET ORAL at 20:37

## 2023-12-20 RX ADMIN — DEXAMETHASONE 4 MG: 4 TABLET ORAL at 09:30

## 2023-12-20 RX ADMIN — ATORVASTATIN CALCIUM 20 MG: 40 TABLET, FILM COATED ORAL at 09:02

## 2023-12-20 RX ADMIN — DOCUSATE SODIUM 100 MG: 100 CAPSULE, LIQUID FILLED ORAL at 11:16

## 2023-12-20 RX ADMIN — ACETAMINOPHEN 975 MG: 325 TABLET ORAL at 13:07

## 2023-12-20 RX ADMIN — PANTOPRAZOLE SODIUM 40 MG: 40 TABLET, DELAYED RELEASE ORAL at 09:29

## 2023-12-20 RX ADMIN — AMLODIPINE BESYLATE 2.5 MG: 5 TABLET ORAL at 09:00

## 2023-12-20 RX ADMIN — GABAPENTIN 300 MG: 300 CAPSULE ORAL at 05:23

## 2023-12-20 RX ADMIN — GABAPENTIN 300 MG: 300 CAPSULE ORAL at 11:16

## 2023-12-20 RX ADMIN — SERTRALINE HYDROCHLORIDE 100 MG: 100 TABLET ORAL at 09:02

## 2023-12-20 RX ADMIN — LIDOCAINE 1 PATCH: 4 PATCH TOPICAL at 09:02

## 2023-12-20 RX ADMIN — ASPIRIN 81 MG: 81 TABLET, COATED ORAL at 09:00

## 2023-12-20 ASSESSMENT — PAIN SCALES - GENERAL
PAINLEVEL_OUTOF10: 3
PAINLEVEL_OUTOF10: 0 - NO PAIN

## 2023-12-20 ASSESSMENT — COGNITIVE AND FUNCTIONAL STATUS - GENERAL
MOVING TO AND FROM BED TO CHAIR: A LOT
DAILY ACTIVITIY SCORE: 14
CLIMB 3 TO 5 STEPS WITH RAILING: TOTAL
MOBILITY SCORE: 11
TOILETING: A LOT
EATING MEALS: A LITTLE
DRESSING REGULAR UPPER BODY CLOTHING: A LOT
HELP NEEDED FOR BATHING: A LOT
DAILY ACTIVITIY SCORE: 14
MOVING FROM LYING ON BACK TO SITTING ON SIDE OF FLAT BED WITH BEDRAILS: A LOT
DRESSING REGULAR LOWER BODY CLOTHING: A LOT
MOBILITY SCORE: 11
TOILETING: A LOT
CLIMB 3 TO 5 STEPS WITH RAILING: TOTAL
MOVING FROM LYING ON BACK TO SITTING ON SIDE OF FLAT BED WITH BEDRAILS: A LOT
TURNING FROM BACK TO SIDE WHILE IN FLAT BAD: A LOT
WALKING IN HOSPITAL ROOM: A LOT
PERSONAL GROOMING: A LITTLE
WALKING IN HOSPITAL ROOM: A LOT
TURNING FROM BACK TO SIDE WHILE IN FLAT BAD: A LOT
STANDING UP FROM CHAIR USING ARMS: A LOT
PERSONAL GROOMING: A LITTLE
STANDING UP FROM CHAIR USING ARMS: A LOT
DRESSING REGULAR LOWER BODY CLOTHING: A LOT
HELP NEEDED FOR BATHING: A LOT
DRESSING REGULAR UPPER BODY CLOTHING: A LOT
MOVING TO AND FROM BED TO CHAIR: A LOT
EATING MEALS: A LITTLE

## 2023-12-20 ASSESSMENT — PAIN - FUNCTIONAL ASSESSMENT
PAIN_FUNCTIONAL_ASSESSMENT: 0-10
PAIN_FUNCTIONAL_ASSESSMENT: 0-10

## 2023-12-20 NOTE — PROGRESS NOTES
Speech-Language Pathology                 Therapy Communication Note    Patient Name: Colton Jiménez  MRN: [id number]  Today's Date: 12/20/2023     Discipline: Speech Language Pathology    Missed Visit Reason: Missed Time Reason: Other (Comment) (too sleepy)    Missed Time: Attempt, pt very drowsy this a.m. even post tactile and verbal stimulation     Comment: possible surgical intervention per neurosurgery -

## 2023-12-20 NOTE — PROGRESS NOTES
"Colton Jiménez is a 88 y.o. male on day 2 of admission presenting with Fall, initial encounter.    Subjective   The patient recalled having me as his surgeon back in 2008 at [facility].  Dr. Vasquez and I performed fusion from L4 to the sacrum.  More recently he has had trouble with his balance and I can see that Dr. Dejah medina at [facility] was recommending surgery for the cervical spine.  The patient was again seen in February 2023 here at [facility] and a CT of his neck was performed.  It is essentially the same today.  He tells me his arm started to get weak years ago.  He does not associate the weakness in his arms to his fall yesterday.  He has a very uncomfortable collar on.  I think at 88 years of age we should make sure that he is in the best medical condition possible before contemplating surgery on his cervical spine.  The patient and his family have issues with his wife needed to be placed as well.  I think if we can get both of them situated then I can have a family discussion after we have cardiac clearance on this patient after the new year.  We can decide at that time whether we want to do a single level anterior cervical disc excision with interbody fusion or if we should do a posterior larger decompression.  General: NAD, AOx 3,  no aphasia or dysarthria, normal fund of knowledge  Cranial Nerves II-XII: VFF, PERRL, EOMI, Face Symm, Facial SILT, Palate/Tongue midline and symmetric  Motor: 5/5 Throughout all extremities except he does have trouble with his fine motor coordination in the hands just having him hold a spoon was difficult.,  No drift, no dysmetria on finger to nose  Sensation: SILT and PP throughout all extremities  Positive Romero's bilaterally       Objective     Physical Exam      Last Recorded Vitals  Blood pressure 166/79, pulse 67, temperature 36.4 °C (97.5 °F), temperature source Temporal, resp. rate 16, height 1.753 m (5' 9\"), weight 72 kg " (158 lb 11.7 oz), SpO2 96 %.  Intake/Output last 3 Shifts:  I/O last 3 completed shifts:  In: - (0 mL/kg)   Out: 200 (2.8 mL/kg) [Urine:200 (0.1 mL/kg/hr)]  Weight: 72 kg     Relevant Results           This patient currently has cardiac telemetry ordered; if you would like to modify or discontinue the telemetry order, click here to go to the orders activity to modify/discontinue the order.                 Assessment/Plan   Principal Problem:    Fall, initial encounter    88-year-old gentleman with severe cervical canal stenosis at C4-5.  The patient has signal change in the spinal cord suggestive of myelomalacia.  The canal stenosis has been present for quite some time and surgery has been recommended in the past by other surgeons.  I think it is in the patient's best interest to get him medically optimized and that we schedule this electively after any irritation to the cord has quieted down.  We need to get him a comfortable collar that he will use that will help minimize his neck motion and allow things to quiet down before scheduling surgery.       I spent 35 minutes in the professional and overall care of this patient.      Juanito Moyer MD

## 2023-12-20 NOTE — PROGRESS NOTES
Physical Therapy    Physical Therapy Evaluation    Patient Name: Colton Jiménez  MRN: [id number]  Today's Date: 12/20/2023   Time Calculation  Start Time: 1143  Stop Time: 1204  Time Calculation (min): 21 min    Assessment/Plan   PT Assessment  PT Assessment Results: Decreased strength, Decreased endurance, Impaired balance, Decreased mobility, Decreased safety awareness  Rehab Prognosis: Good  Assessment Comment: Continued skilled PT intervention indicated to facilitate increased strength, balance & gait stability  End of Session Patient Position: Bed, 2 rail up, Alarm on (hob elevated to comfort, call light in reach, all needs met)  IP OR SWING BED PT PLAN  Inpatient or Swing Bed: Inpatient  PT Plan  Treatment/Interventions: Bed mobility, Transfer training, Gait training, Balance training, Therapeutic exercise, Therapeutic activity  PT Plan: Skilled PT  PT Frequency: 3 times per week  PT Discharge Recommendations: Moderate intensity level of continued care  PT - OK to Discharge: Yes (to next leve of care when cleared by medical team)    Subjective     Current Problem:  Patient Active Problem List   Diagnosis    Fall, initial encounter       General Visit Information:  General  Reason for Referral: PT eval & treat/impaired mobility;12/18/23 fell 2x, c/o head/neck/back pain; ctap: comminuted fx's lt superior/inferior rami; ct cspine: disc osteophyte complexes C4-5, C5-6 w/ canal narrowing; mri spine:severe canal narrowing w/cord impingement/myelomalacia C4-5; Neurosx recommends cervical decompression,ok to work w/therapy w/cervical collar on at all times. Ortho consult:no ortho intervention,PT wbat  Referred By: Ileana  Past Medical History Relevant to Rehab: malignant neoplasm, lumbar stenosis, headaches, pud, chronic back pain, bph, l4-s1 fusion  Missed Visit: Yes  Missed Visit Reason:  (PT eval deferred, possible surgical intervention per neurosurgery)  Caregiver Feedback: Per conference w/ RN patient stable to  participate in therapy  General Comment: Pleasant & cooperative, receptive to mobility& instructions, oriented to self, mos/yr, & to place w/ cues    Home Living:  Home Living  Lives With:  (w/ spouse who is presently hospitalized, pt is her caregiver assists w/ transfers & mobilty w/ WC; 2dtrs local/supportive; 4steps w/o rail to enter [facility] fl setup including laundry;stall shower;standard ht toilet;standard bed)    Prior Level of Function:  Prior Function Per Pt/Caregiver Report  Level of [place]:  (indepdendent w/o use of device, recent h/o falls; ind adl; dtrs assist w/ iadl's prn & pt w/ recent meals on wheels; pt drives)    Precautions:  Precautions  Precautions Comment: fall, alarm, cervical collar at all times, O2, purewick  Pain:  Pain Assessment  Pain Assessment:  (denies c/o pain)  General Assessments:    Sensation  Sensation Comment: denies numbness/tingling      Functional Assessments:     Bed Mobility  Bed Mobility:  (mod assist x2 supine<>sit leans to rt upon sitting)  Transfers  Transfer:  (mod assistx1 elevated bed to ww leans to rt & retropulsive, difficulty extending trunk & le's)  Ambulation/Gait Training  Ambulation/Gait Training Performed:  (mod assist x1 w/ ww 2ft  side stepping, 2ft forward/2ft backward, 2ft side stepping; leans rt & retropulsive improved as standing activity progressed, general instability/fall risk w/ buckling le's)   Extremity/Trunk Assessments:   RLE   RLE :  (end rom tightness heelcords & hamstrings w/seated knee ext minus ~20deg, ankle df to neutral; stength grossly 3+/5)  LLE   LLE :  (end rom tightness heelcords & hamstrings w/seated knee ext minus ~20deg, ankle df to neutral; stength grossly 3+/5)    Outcome Measures:  [facility] Basic Mobility  Turning from your back to your side while in a flat bed without using bedrails: A lot  Moving from lying on your back to sitting on the side of a flat bed without using bedrails: A lot  Moving to and from bed to chair  (including a wheelchair): A lot  Standing up from a chair using your arms (e.g. wheelchair or bedside chair): A lot  To walk in hospital room: A lot  Climbing 3-5 steps with railing: Total  Basic Mobility - Total Score: 11     Goals:  Encounter Problems       Encounter Problems (Active)       PT Problem       bed mobility  (Progressing)       Start:  12/20/23    Expected End:  01/03/24       Sba supine<>sit          transfers  (Progressing)       Start:  12/20/23    Expected End:  01/03/24       Cga sit<>stand w/ ww         gait   (Progressing)       Start:  12/20/23    Expected End:  01/03/24       Cga >=40ftx2 w/ use of ww         balance   (Progressing)       Start:  12/20/23    Expected End:  01/03/24       Sba maintaining sitting supported balance while performing >=10reps x2 sets ble therex to facilitate inc fxl mobility& gait stability             Pain - Adult            Education Documentation  Mobility Training, taught by Ondina Aguilar, PT at 12/20/2023  1:02 PM.  Learner: Patient  Readiness: Acceptance  Method: Explanation  Response: Verbalizes Understanding, Needs Reinforcement  Comment: safety, activity progression, use of ww, le arom

## 2023-12-20 NOTE — PROGRESS NOTES
"Colton Jiménez is a 88 y.o. male on day 2 of admission presenting with Fall, initial encounter.    Subjective   Patient states the only thing bothering him right now in the C-collar- \"it is pushing up on my ear\" and he is having difficulty feeding himself      Denies any numbness or tingling or new weakness in BUE    Objective     Physical Exam  Constitutional:       General: He is not in acute distress.     Appearance: He is normal weight. He is ill-appearing.   HENT:      Head: Normocephalic.      Comments: Hard C-Collar in place- well fitting on neck, but pushing up on right ear     Right Ear: External ear normal.      Left Ear: External ear normal.      Mouth/Throat:      Mouth: Mucous membranes are moist.      Comments: Upper airway/throat congestion. Teeth in fair repair   Eyes:      General: Scleral icterus present. No visual field deficit.     Extraocular Movements: Extraocular movements intact.      Pupils: Pupils are equal, round, and reactive to light.      Comments: Jaundiced appearing eyes, conjunctiva slightly sallow/pale in appearance - not injected    Cardiovascular:      Rate and Rhythm: Normal rate and regular rhythm.      Pulses: Normal pulses.      Comments: NSR 65  Pulmonary:      Effort: Pulmonary effort is normal. No respiratory distress.      Breath sounds: Examination of the right-upper field reveals rhonchi. Examination of the left-upper field reveals rhonchi. Rhonchi present. No wheezing.   Chest:      Chest wall: No tenderness.   Abdominal:      General: Bowel sounds are normal. There is no distension.      Palpations: Abdomen is soft.      Tenderness: There is no abdominal tenderness.   Genitourinary:     Penis: Normal.       Comments: External catheter   Musculoskeletal:      Right lower leg: No edema.      Left lower leg: No edema.   Skin:     General: Skin is warm.      Coloration: Skin is not jaundiced.   Neurological:      Mental Status: He is alert and oriented to person, place, and " "time.      GCS: GCS eye subscore is 4. GCS verbal subscore is 5. GCS motor subscore is 6.      Cranial Nerves: No facial asymmetry.      Sensory: No sensory deficit.      Motor: No weakness.      Coordination: Coordination abnormal. Finger-Nose-Finger Test abnormal.      Comments: 5/5 strength in BUE and BLE in proximal and distal muscle groups    Psychiatric:      Comments: Poor judgement / insight          Last Recorded Vitals  Blood pressure 118/63, pulse 78, temperature 37.1 °C (98.8 °F), resp. rate 16, height 1.753 m (5' 9.02\"), weight 72 kg (158 lb 11.7 oz), SpO2 98 %.  Intake/Output last 3 Shifts:  I/O last 3 completed shifts:  In: - (0 mL/kg)   Out: 200 (2.8 mL/kg) [Urine:200 (0.1 mL/kg/hr)]  Weight: 72 kg     Relevant Results  Scheduled medications  acetaminophen, 975 mg, oral, q8h  amLODIPine, 2.5 mg, oral, Daily  aspirin, 81 mg, oral, Daily  atorvastatin, 20 mg, oral, Daily  dexAMETHasone, 4 mg, oral, Daily  docusate sodium, 100 mg, oral, BID  finasteride, 5 mg, oral, Daily  fluticasone, 2 spray, Each Nostril, Daily  gabapentin, 300 mg, oral, TID  lidocaine, 1 patch, transdermal, Daily  magnesium oxide, 400 mg, oral, Daily  pantoprazole, 40 mg, oral, Daily before breakfast  polyethylene glycol, 17 g, oral, Daily  sertraline, 100 mg, oral, Daily      Continuous medications     PRN medications  PRN medications: morphine, ondansetron, oxyCODONE, traMADol    Results for orders placed or performed during the hospital encounter of 12/18/23 (from the past 24 hour(s))   Magnesium   Result Value Ref Range    Magnesium 1.98 1.60 - 2.40 mg/dL   Basic Metabolic Panel   Result Value Ref Range    Glucose 105 (H) 74 - 99 mg/dL    Sodium 139 136 - 145 mmol/L    Potassium 4.2 3.5 - 5.3 mmol/L    Chloride 102 98 - 107 mmol/L    Bicarbonate 31 21 - 32 mmol/L    Anion Gap 10 10 - 20 mmol/L    Urea Nitrogen 23 6 - 23 mg/dL    Creatinine 0.60 0.50 - 1.30 mg/dL    eGFR >90 >60 mL/min/1.73m*2    Calcium 8.8 8.6 - 10.3 mg/dL "   CBC   Result Value Ref Range    WBC 5.3 4.4 - 11.3 x10*3/uL    nRBC 0.0 0.0 - 0.0 /100 WBCs    RBC 3.69 (L) 4.50 - 5.90 x10*6/uL    Hemoglobin 11.9 (L) 13.5 - 17.5 g/dL    Hematocrit 36.5 (L) 41.0 - 52.0 %    MCV 99 80 - 100 fL    MCH 32.2 26.0 - 34.0 pg    MCHC 32.6 32.0 - 36.0 g/dL    RDW 13.5 11.5 - 14.5 %    Platelets 118 (L) 150 - 450 x10*3/uL                Assessment/Plan   Principal Problem:    Fall, initial encounter    89 y/o male with a PMH significant for depression/anxiety, duodenal ulcer, HTN, thrombocytopenia, TIA, and paroxysmal Vtach who presented to [facility] on 12/18 as a limited trauma after fall from standing at home. Pt reportedly found down in the snow by his neighbor. Estimated down time ~30 minutes, positive head strike, pt denies LOC, unwitnessed fall. Pt endorsing Lt pelvic pain and neck pain.      List of Injuries/Significant Findings:  Lt comminuted and displaced pubic rami fx  Pelvic hematoma  Lt scalp laceration  Neck pain and tenderness  Severe cervical canal stenosis C4-5 - chronic   Frequent falls, prior syncope episode     Assessment and Recommendations:  - Continue C-collar at all times; awaiting [place] collar from DME   - Appreciate NSGY recommendations    > outpatient follow up for recommended cervical decompression.  - monitor for acute neurological changes/weakness to BUE  - Ortho consulted for pubic fracture     -> Nonop, WBAT  - Multimodal pain control    > Scheduled tylenol, gabapentin, and lidocaine patch    > Avoid NSAIDs given h/o duodenal ulcer    > PRN tramadol/oxycodone for mod-severe pain  - Outpatient falls clinic referral   - Hold DVT chemoprophylaxis given evidence of active bleeding on CT related to pelvic hematoma   - DVT ppx with SCDs only  - would be okay for PRN labs only given 48 hour stability   - PT/OT, pending eval - OKAY for PT/OT to work with patient as long as C-collar remains on     Dispo:  Anticipate SNF at discharge     The patient was seen and  discussed with the attending surgeon Dr. Romero    There are no new acute traumatic injuries or ongoing issues for trauma surgery to manage. We will now sign off. Thank You  Please re-consult if new issues should arise.       I spent 30 minutes in the professional and overall care of this patient.      Stacy Rashid, APRN-CNP

## 2023-12-20 NOTE — DISCHARGE SUMMARY
Discharge Diagnosis  Fall, initial encounter    Discharge Meds     Your medication list        ASK your doctor about these medications        Instructions Last Dose Given Next Dose Due   amLODIPine 2.5 mg tablet  Commonly known as: Norvasc           aspirin 81 mg EC tablet           atorvastatin 20 mg tablet  Commonly known as: Lipitor           biotin 10 mg tablet           finasteride 5 mg tablet  Commonly known as: Proscar           flunisolide 25 mcg (0.025 %) spray,non-aerosol  Commonly known as: Nasalide           sertraline 100 mg tablet  Commonly known as: Zoloft                    Test Results Pending At Discharge  Pending Labs       No current pending labs.            Hospital Course  Colton Jiménez is a 88 y.o. male who presents to the hospital with complaints of fall. Patient states he went out to check his mail and slipped on the driveway.  Patient endorses hitting his head twice however denies LOC.  Patient arrived hemodynamically stable however was found to have a fracture of the left pubic rami along with small hematoma and wearing a c-collar on examination for neck pain.  Orthopedics was consulted and recommended no surgical intervention at this time and instead focused on pain control and physical therapy, hemoglobin was trended and found to be stable. A CT of the head/C-spine along with an MRI of the head/neck showed no acute hemorrhage or fractures.  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  Patient to be discharged on all his medications prior to arrival and to SNF.  Patient advised to follow-up with outpatient falls clinic referral, neurosurgery, and orthopedics.  Patient medically optimized for discharge.    Pertinent Physical Exam At Time of Discharge  Physical Exam    General: Laying in bed with c-collar   HEENT: Laceration  across left eyebrow noted  Chest:  Clear to auscultation bilaterally  CV:  Regular rate and rhythm.    Extremities:  No lower extremity edema or cyanosis.   Neurological:  AAOx3.  Mildly dysarthric  Skin:  Warm and dry.    Outpatient Follow-Up  Future Appointments   Date Time Provider Department Center   1/9/2024  1:45 PM Juanito Moyer MD [id number] [place]         Juan J Tejeda DO

## 2023-12-20 NOTE — PROGRESS NOTES
"Colton Jiménez is a 88 y.o. male on day 2 of admission presenting with Fall, initial encounter.    SUBJECTIVE      OBJECTIVE    Physical Exam:  General: Laying in bed with c-collar in obvious distress  HEENT: Laceration across left eyebrow noted  Chest:  Clear to auscultation bilaterally  CV:  Regular rate and rhythm.    Extremities:  No lower extremity edema or cyanosis.   Neurological:  AAOx3.  Mildly dysarthric  Skin:  Warm and dry.      Last Recorded Vitals  Blood pressure 142/80, pulse 63, temperature 37 °C (98.6 °F), resp. rate 18, height 1.753 m (5' 9.02\"), weight 72 kg (158 lb 11.7 oz), SpO2 96 %.  Intake/Output last 3 Shifts:  I/O last 3 completed shifts:  In: - (0 mL/kg)   Out: 200 (2.8 mL/kg) [Urine:200 (0.1 mL/kg/hr)]  Weight: 72 kg     Last CBC & BMP  Lab Results   Component Value Date    GLUCOSE 105 (H) 12/20/2023    CALCIUM 8.8 12/20/2023     12/20/2023    K 4.2 12/20/2023    CO2 31 12/20/2023     12/20/2023    BUN 23 12/20/2023    CREATININE 0.60 12/20/2023     Lab Results   Component Value Date    WBC 5.3 12/20/2023    HGB 11.9 (L) 12/20/2023    HCT 36.5 (L) 12/20/2023    MCV 99 12/20/2023     (L) 12/20/2023       ASSESSMENT & PLAN  Colton Jiménez is a 88 y.o. male with past medical history of Hx TIA, Hx DVT, HTN, HLD who presents to the hospital with complaints of fall. Patient states he went out to check his mail and slipped on the driveway. He did strike his head during the fall. He denies LOC. He states he has generally felt well before the fall. His COVID 19 PCR is positive. Looking at the chart looks like he had a symptomatic COVID 19 infection a few weeks ago and was treated with Paxlovid. Patient himself does not recall this but is clearly documented by his PCP.     #Mechanical Fall  #Pubic Rami Fracture w/ hematoma 2/2 above   #L scalp laceration 2/2 above  -Small region of active bleed and hematoma immediately superior to pubic ramus fracture  -CT head/C-spine shows no brain " hemorrhage, fractures  -MRI head and neck shows no acute infarct, hemorrhage or mass noted  -Neck pain likely muscular  Plan  -Pain control, Tylenol, gabapentin, lidocaine patch and morphine IV 2-4 Mg as needed  -PT/OT, speech therapy, social service consulted  -Ortho following, no surgical intervention at this time    #Severe cervical stenosis  -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 centered at disc protrusion and endplate spurring at C4-5 compressing the cord and is severely stenosing with spinal canal  Plan  -Neurosurgery consulted, patient will need to follow-up with neurosurgery as an outpatient to pursue cervical decompression        #COVID 19 + pcr - false positive   -CXR shows no focal lung consolidation or effusion, asymptomatic will monitor      #HTN  #HLD  #Depression/Anxiety   #Hx TIA  #Hx pVT   -Resume home amlodipine, atorvastatin, no satellite, sertraline, aspirin, finasteride        Inpatient Checklist  Code Status: Full  DVT prophylaxis: SCDs, hold given evidence of active bleeding on CT  Lines/Drains/Tubes: IV peripheral  Diet: Regular  Disposition ->Destination: Medicine, likely SNF  ->DC Medications/Needs/Follow-ups: Follow-up with neurology in 4 months, follow-up with orthopedics in 10-14 days      Juan J Tejeda, DO  PGY-1, Internal Medicine  Please SecureChat for any further questions  This is a preliminary note, please await attending attestation for final A/P'

## 2023-12-20 NOTE — PROGRESS NOTES
SW spoke with Attending regarding dc plan for pt. Attending recommending SNF for pt. Awaiting PT/OT evals. Requested a new eval today and confirmed that request received from PT. SW contacted pt Daughter Vivienne and left a message to discuss dc planning. SW will follow up.    DOUGLAS YOUNGER    UPDATED NOTE 11:19AM  SW received call from pt Daughter Vivienne. Vivienne defers to pts decision on where pt Wife and pt go for continued care. SW explained that pt is currently confused and having difficulty making decisions. SW explained that SW wanted to reach out to Vivienne because she knows her parents well and we want to make sure their wishes are known. Vivienne interested in facilities close to her zip code ([id number]) and prefers that they go together to the facility. Vivienne is visiting the hospital later today and SW will meet with her to provide a list and discuss options for placement for pt.    DOUGLAS YOUNGER    UPDATED NOTE 3PM   SW met with pt, pt's wife, pt Jessica Sim and Felisha in pt room. SW explained continued care options for pt an pt wife. SW provided lists of SNF placements for pt. Daughters interested in [facility]. SW sent referral and will follow up on acceptance.    DOUGLAS YOUNGER

## 2023-12-20 NOTE — NURSING NOTE
"1910 pt arrived from ed, C collar not in place, pt stated he removed the C collar, pt educated on importance of C collar and risks/benefits, pt stated \"I will try to keep it on\"     2020 pt becoming agitated attempting to remove c collar, yelling at staff, \"take this off now\" pt not receptive to education at this time. page send out to hospitalist    2053 surgical NP contacted, waiting orders. Pt removes C collar, refusing nursing to put it back on.     2100 surgical NP and this nurse at bedside, education provided regarding c collar compliance, PRN ativan given as ordered, C collar placed by surgical NP and this nurse, pt resting quietly at this time with c collar in place  "

## 2023-12-20 NOTE — PROGRESS NOTES
Speech-Language Pathology      Speech-Language Pathology Clinical Swallow Evaluation per MD request after clarifying order. .    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 male who presents to the hospital s/p a fall on ice 23.   Patient states he went out to check his mail and slipped on the driveway. He did strike his head during the fall. He denies LOC. He states he has generally felt well before the fall. His COVID 19 PCR is positive. Looking at the chart looks like he had a symptomatic COVID 19 infection a few weeks ago and was treated with Paxlovid.  He was noted to have dysarthria in ED however speech clear this date with MRI negative fo ran acute infarct.    He currently admits to frequent falls at home, imbalance (uses walls, furniture to maneuver through home), dropping items.        ED work up showed fractures of the L pubic rami along with a small hematoma.   pmhx; htn, hld, depression, anxiety, TIA, PVT, lumbar fusion in  ,  basal cell excision , cervical spurring and canal stenosis at C4-c5           Relevant imaging results:    CT C Spine 2023, demonstrates disc - osteophyte complexes at C4 - 5 and C5 - 6 with canal narrowing and multilevel spondylosis without acute osseous abnormality of the cervical spine   MRI C Spine 2023, demonstrates similar findings to one year ago.   CT T and L Spine demonstrate multilevel degenerative changes, post operative lumbar changes.   MRI / MRA brain completed 23  negative for an acute infarct or hemorrhage or mass       PLAN  Recommendations:  Solid consistency: Regular (IDDSI level 7)  Liquid consistency: Thin (IDDSI 0)  Medication administration: Whole and in puree  Compensatory swallow strategies: Upright positioning for all PO intake, Remain upright for >30 min after meals, Slow rate of intake, and Alternate bites and sips, small sips, bites.     Recommended frequency/duration:  Skilled SLP services recommended: Yes  Frequency: 1x/week  Duration: x1 follow-up visit to ensure ongoing safety with current diet  Discharge recommendation: Unable to determine at this time; please see follow-up notes for DC recommendation.    Goals:  1. Pt will consume prescribed diet of RG7/TNO without overt s/sx aspiration/penetration >95% of the time.  2. Pt/family education        SUBJECTIVE  SLP Received On: 12/20/23      Pt 's speech is clear with pt Ox3 .   Pt denies any swallowing issues.   Pt lives with  at home alone.   PLOF-reg/thins   Pt on RA with stable respiratory skills and No SOB.      RN cleared pt to participate in session and reported that pt was now fully awake with no obvious swallowing noted issues.   WBC 5.3     Pain Assessment  Pain Assessment: 0-10  Pain Score: 3  Pain Type: Acute pain  Pain Location: Neck  Clinical Progression: Gradually improving  Patient's Stated Pain Goal: No pain       Orientation: Oriented to self, Oriented to location, Oriented to time, Oriented to month, Oriented to year, Oriented to day with cues  and Oriented to situation.     Patient positioning: Upright in bed, feeding self a late breakfast meal.      OBJECTIVE  Clinical swallow evaluation completed and consisted of interview, oral motor assessment, and PO trials of the breakfast meal ( potatoes, Tristanian toast, hard fruit, 3 ounces of water via cup before PO and post PO  and  Nectars by 3 ounces via straw .  ORAL PHASE: Natural dentition in good condition.   Oral mucosa were pink, moist, and  free of obvious lesions.   Lingual strength and ROM were WFL.    Labial strength/ROM were WFL   Labial seal was adequate.   Mastication of regular solids was efficient.   A/P transit was WFL.   No oral residuals were seen.    PHARYNGEAL PHASE:   Laryngeal elevation was visualized or palpated with all trials, however adequacy of hyolaryngeal elevation/excursion cannot be determined at bedside. Pt now wearing a hard cervical collar.   No immediate or delayed s/sx aspiration/penetration were observed with any consistencies except throat clears x3 post the exam with pt c/o phlegm and was able to expectorate CLEAR min amounts of mucous.   Vocal quality remained clear.       ASSESSMENT  Impressions:  WFL Oral  , Suspected functional  pharyngeal phase without bolus retention as pt passed the 3 ounce water exam and has no c/o PO sticking in his pharynx. Pt denies prior h/o swallowing issues as related to his Cervical DJD.        Treatment/Education:  Results and recommendations were relayed to: Patient, Bedside nurse Karin and Physician Corin.   Education provided: Yes   Learner: Patient   Barriers to learning: None   Method of teaching: Verbal and Demonstration   Topic: Role of ST, results of assessment, risk for aspiration, recommended diet modifications, recommendation for MBSS if s/sx of aspiration occur across PO, recommended safe swallow strategies, and recommendation for dysphagia follow-up for ongoing meal analysis.    Outcome of teaching: Pt verbalized understanding and Education will be reinforced during follow-up visits, as appropriate  Treatment provided: No

## 2023-12-20 NOTE — CONSULTS
"Nutrition Note  Reason for Assessment  Reason for Assessment: Admission nursing screening, Dietitian discretion (missed MST=5 for weight loss and decreased po intakes)    Visit Reason: s/p fall, head injury; COVID recovered     Recommendation(s):  Diet as ordered and tolerated   Mighty shakes twice daily (For an additional 220 kcals, 6 gm protein each)  and Magic cup once daily (For an additional 290 kcals, 9 gm protein each)    Reweigh at least every 5 days   Consider daily MVI       Nutrition Assessment    Nutrition History  Energy Intake:  (not established yet)  Food and Nutrient History: Pt is COVID recovered.  Pt sleeping at time of attempted visit today; noted that patient lives at home with wife who has Dementia. Wife is also hospitalized at this time.      Difficulty chewing: none noted  Difficulty swallowing: none noted     Per Flowsheet Percent Meal intake:    Dietary Orders (From admission, onward)       Start     Ordered    12/20/23 1148  Oral nutritional supplements  Until discontinued        Question Answer Comment   Deliver with Breakfast    Deliver with Dinner    Select supplement: Mighty Shake        12/20/23 1147    12/20/23 1148  Oral nutritional supplements  Until discontinued        Question Answer Comment   Deliver with Lunch    Select supplement: Magic Cup        12/20/23 1147    12/19/23 1218  Adult diet Regular  Diet effective now        Question:  Diet type  Answer:  Regular    12/19/23 1217                     Results from last 7 days   Lab Units 12/20/23  0516 12/19/23  0840 12/18/23  1749   GLUCOSE mg/dL 105* 99 90   SODIUM mmol/L 139 139 140   POTASSIUM mmol/L 4.2 4.2 3.7   CHLORIDE mmol/L 102 102 103   CO2 mmol/L 31 33* 32   BUN mg/dL 23 17 20   CREATININE mg/dL 0.60 0.66 0.83   EGFR mL/min/1.73m*2 >90 >90 85   CALCIUM mg/dL 8.8 8.7 9.1   MAGNESIUM mg/dL 1.98 1.94  --      No results found for: \"HGBA1C\"  Results from last 7 days   Lab Units 12/18/23  1744   POCT GLUCOSE mg/dL 91     GI " "per flowsheet:  Gastrointestinal  Gastrointestinal (WDL): Within Defined Limits  Abdomen Inspection: Soft, Flat, Nondistended  Last bowel movement documented:  unsure of last BM   PMH: confusion, mild aphasia, L superior/inferior pubic rami fx with small hematoma, HTN, HLD, MDD, depression, anxiety   Allergies: Patient has no known allergies.     Anthropometrics:  Height: 175.3 cm (5' 9.02\")  Weight: 72 kg (158 lb 11.7 oz)  BMI (Calculated): 23.43  IBW: 72.7 kg  %IBW: 100 %      Weight History / % Weight Change: 7/11/23 67.5kg       Significant Weight Loss: No     Estimated Nutritional Needs:  Method for Estimating Needs: 1800-1940kcals (25-27kcals/kg ABW)    Method for Estimating Needs: 79-86g (1.1-1.2g/kg ABW)    Method for Estimating Needs: 1 mL/kcal or as per MD    Nutrition Focused Physical Findings:   Orbital Fat Pads: Defer (pt sleeping)         Edema  Edema: none    Skin: Positive (L face)  Pain Score: 3     Nutrition Diagnosis   Patient has Malnutrition Diagnosis: No    Patient has Nutrition Diagnosis: Yes  New  Nutrition Diagnosis 1: Predicted inadequate energy intake  Related to (1): acute on chronic illness, advanced age  As Evidenced by (1): MST reports decreased po intakes and weight loss          Nutrition Interventions/Recommendations   Interventions     Interventions: Meals and snacks  Meals and Snacks: General healthful diet  Goal: consume >75% of meals  Additional Interventions: will order Mighty shakes twice daily and magic up once daily  Individualized Nutrition Prescription Provided for : Regular diet    Nutrition Monitoring and Evaluation   Body Composition/Growth/Weight History  Monitoring and Evaluation Plan: Weight  Weight: Measured weight  Criteria: bed scale/standing scale weights obtained; reweigh at least every 5 days  Biochemical Data, Medical Tests and Procedures  Monitoring and Evaluation Plan: Electrolyte/renal panel  Food/Nutrient Related History Monitoring  Monitoring and " Evaluation Plan: Energy intake, Fluid intake, Amount of food  Energy Intake: Estimated energy intake  Criteria: energy intake compared to estimated need  Fluid Intake: Estimated fluid intake  Criteria: fluid intake compared to estimated need  Criteria: percent of meals/ONS consumed  Nutrition Focused Physical Findings  Monitoring and Evaluation Plan: Skin  Criteria: maintain skin integrity and promote wound healing    Progress towards goals: Will assess progress towards goals at follow up.      Education Documentation  No documentation found.      Time Spent (min): 45 minutes  Last Date of Nutrition Visit: 12/20/23  Nutrition Follow-Up Needed?: 3-8 days  Follow up Comment: 12/26 TG

## 2023-12-20 NOTE — PROGRESS NOTES
Occupational Therapy    Evaluation    Patient Name: Colton Jiménez  MRN: [id number]  Today's Date: 12/20/2023  Time Calculation  Start Time: 1144  Stop Time: 1204  Time Calculation (min): 20 min        Assessment:  End of Session Patient Position: Bed, 2 rail up, Alarm on (hob elevated to comfort, call light in reach, all needs met)     Plan:  Treatment Interventions: ADL retraining, Functional transfer training, UE strengthening/ROM, Endurance training, Fine motor coordination activities, Compensatory technique education  OT Frequency: 3 times per week  OT Discharge Recommendations: Moderate intensity level of continued care  OT - OK to Discharge: Yes (once medically appropriate to next level of care)  Treatment Interventions: ADL retraining, Functional transfer training, UE strengthening/ROM, Endurance training, Fine motor coordination activities, Compensatory technique education    Subjective   Current Problem:  1. Fall, initial encounter        2. Injury of head, initial encounter        3. Closed fracture of multiple rami of left pubis, initial encounter (CMS/[facility])          General:  General  Reason for Referral: OT eval and tx; ADLs. 12/18/23 fell 2x, c/o head/neck/back pain; ctap: comminuted fx's lt superior/inferior rami; ct cspine: disc osteophyte complexes C4-5, C5-6 w/ canal narrowing; mri spine:severe canal narrowing w/cord impingement/myelomalacia C4-5; Neurosx recommends cervical decompression,ok to work w/therapy w/cervical collar on at all times. Ortho consult:no ortho intervention,PT wbat  Referred By: Ileana  Past Medical History Relevant to Rehab: 88 y.o. male who presents to the hospital with complaints of fall. Patient states he went out to check his mail and slipped on the driveway. He did strike his head during the fall. He denies LOC. He states he has generally felt well before the fall. His COVID 19 PCR is positive. Looking at the chart looks like he had a symptomatic COVID 19 infection a few  weeks ago and was treated with Paxlovid. Patient himself does not recall this but is clearly documented by his PCP.  Co-Treatment: PT  Co-Treatment Reason: for safety and to maximize therapeutic performance  Prior to Session Communication: Bedside nurse  Patient Position Received:  (patient seated upright in bed at start and end of eval, 2 rails up, call light in reach, bed alarm engaged, all needs met. tele in place throughout)  Precautions:  Precautions Comment: fall, alarm, cervical collar at all times, O2, purewick, C-spine precautions    Pain:  Pain Assessment  Pain Assessment:  (patient did not report pain)    Objective   Cognition:  Overall Cognitive Status: Within Functional Limits (oriented to self, month/year; oriented to place with cues)           Home Living:  Lives With:  (w/ spouse who is presently hospitalized, pt is her caregiver assists w/ transfers, ADLs, & mobilty w/ WC; 2dtrs local/supportive; 4steps w/o rail to enter [facility] setup including laundry;stall shower;standard ht toilet;standard bed)  Prior Function:  Level of [place]:  (indepdendent w/o use of device, recent h/o falls; ind adl; dtrs assist w/ iadl's prn & pt w/ recent meals on wheels; pt drives)       ADL:  ADL Comments: anticipate MAX A for ADLs based on performance with transfers/mobility and precautions    Bed Mobility/Transfers: Bed Mobility  Bed Mobility:  (completed supine <-->sit with MOD A x 2; right lateral lean noted)    Transfers  Transfer:  (completed STS with MOD A x 1 with WW)      Ambulation/Gait Training:  Ambulation/Gait Training  Ambulation/Gait Training Performed:  (completed simple mobility with MOD A x 1 with WW, retropulsion noted)     Sensation:  Sensation Comment: denies sensation changes      Outcome Measures:[facility] Daily Activity  Putting on and taking off regular lower body clothing: A lot  Bathing (including washing, rinsing, drying): A lot  Putting on and taking off regular upper body clothing: A  lot  Toileting, which includes using toilet, bedpan or urinal: A lot  Taking care of personal grooming such as brushing teeth: A little  Eating Meals: A little  Daily Activity - Total Score: 14        Education Documentation  Body Mechanics, taught by Alis Ramsey OT at 12/20/2023  1:25 PM.  Learner: Patient  Readiness: Acceptance  Method: Explanation  Response: Verbalizes Understanding, Needs Reinforcement    Precautions, taught by Alis Ramsey OT at 12/20/2023  1:25 PM.  Learner: Patient  Readiness: Acceptance  Method: Explanation  Response: Verbalizes Understanding, Needs Reinforcement    Education Comments  No comments found.        OP EDUCATION:       Goals:  Encounter Problems       Encounter Problems (Active)       OT Goals       STG- patient will complete toileting with MIN A x 1 with use of ae/ad/dme prn (Progressing)       Start:  12/20/23    Expected End:  01/03/24            STG- patient will complete LB dressing with MIN A x1 with use of ae/ad/dme prn (Progressing)       Start:  12/20/23    Expected End:  01/03/24            STG- patient will complete transfers to/from bed, chair, commode to MIN A x 1 with use of ae/ad/dme prn (Progressing)       Start:  12/20/23    Expected End:  01/03/24            STG- patient will complete simple mobility with MIN A x 1 with use of ae/ad/dme prn (Progressing)       Start:  12/20/23    Expected End:  01/03/24            STG- Patient will complete grooming with set up/clean up assist with use of ae/ad/dme prn (Progressing)       Start:  12/20/23    Expected End:  01/03/24

## 2023-12-20 NOTE — CARE PLAN
The patient's goals for the shift include  Pain management    The clinical goals for the shift include maintain safety

## 2023-12-20 NOTE — PROGRESS NOTES
12/20/23 1101   Discharge Planning   Living Arrangements Spouse/significant other   Support Systems Spouse/significant other   Type of Residence Private residence   Do you have animals or pets at home? No   Who is requesting discharge planning? Provider   Type of Post Acute Facility Services Skilled nursing   Patient expects to be discharged to: skilled   Does the patient need discharge transport arranged? Yes     Met with pt this am with Dr Coombs, pt medicated but able to answer questions.  Pt aware that he will need skilled care at discharge, pt admit for fractured pelvis from a fall, no surgical intervention needed.  Pt also has neck pain,  C-Collar maintained, pt has cervical stenosis, may need surgery  to repair the stenosis  in future .   Awaiting therapy, SW updated.  Plan is for discharge and obtain preferences from daughter, SW called her and message was left.  Will need precert.   Shanta Morris RN T[facility]

## 2023-12-20 NOTE — CARE PLAN
The patient's goals for the shift include      The clinical goals for the shift include  maintain safety  Problem: Nutrition  Goal: Less than 5 days NPO/clear liquids  Outcome: Progressing  Goal: Oral intake greater than 50%  Outcome: Progressing  Goal: Oral intake greater 75%  Outcome: Progressing  Goal: Consume prescribed supplement  Outcome: Progressing  Goal: Adequate PO fluid intake  Outcome: Progressing  Goal: Nutrition support goals are met within 48 hrs  Outcome: Progressing  Goal: Nutrition support is meeting 75% of nutrient needs  Outcome: Progressing  Goal: Tube feed tolerance  Outcome: Progressing  Goal: BG  mg/dL  Outcome: Progressing  Goal: Lab values WNL  Outcome: Progressing  Goal: Electrolytes WNL  Outcome: Progressing  Goal: Promote healing  Outcome: Progressing  Goal: Maintain stable weight  Outcome: Progressing  Goal: Reduce weight from edema/fluid  Outcome: Progressing  Goal: Gradual weight gain  Outcome: Progressing  Goal: Improve ostomy output  Outcome: Progressing     Problem: Skin  Goal: Decreased wound size/increased tissue granulation at next dressing change  Outcome: Progressing  Goal: Participates in plan/prevention/treatment measures  Outcome: Progressing  Goal: Prevent/manage excess moisture  Outcome: Progressing  Goal: Prevent/minimize sheer/friction injuries  Outcome: Progressing  Flowsheets (Taken 12/19/2023 2206)  Prevent/minimize sheer/friction injuries: Use pull sheet  Goal: Promote/optimize nutrition  Outcome: Progressing  Goal: Promote skin healing  Outcome: Progressing     Problem: Safety - Adult  Goal: Free from fall injury  Outcome: Progressing

## 2023-12-20 NOTE — DISCHARGE INSTRUCTIONS
PLEASE HAVE FAMILY MEMBER OTHER THAN YOUR WIFE come with you to your appointment with Dr. Juanito Moyer on January 9, 2024, to discuss your surgical options.  Please call [phone] with any questions about your visit with Dr. Moyer.    Please call neurology today to make an appointment for follow-up at 4 months    Please call your PCP today to make a follow-up appointment as soon as possible within 1-2 weeks

## 2023-12-20 NOTE — PROGRESS NOTES
Physical Therapy                 Therapy Communication Note    Patient Name: Colton Jiménez  MRN: [id number]  Today's Date: 12/20/2023     Discipline: Physical Therapy    Missed Visit Reason: Missed Visit Reason:  (PT eval deferred, possible surgical intervention per neurosurgery)

## 2023-12-20 NOTE — PROGRESS NOTES
"General surgery attending note:  I examined and evaluated the patient.  I discussed the patient with the LUIS and reviewed the LUIS note.    Subjective   Complains of left pelvic pain with movement.  C-collar is uncomfortable.  No other complaints.       Objective     Physical Exam  Well-developed, well-nourished, no acute distress, awake and alert  HEENT: Left eyebrow abrasion.  Nontender  Neck: Cervical collar in place.  Chest: Nontender  Abdomen: Nontender  Pelvis: Left sided pubis tenderness  Extremities: Nontender    Last Recorded Vitals  Blood pressure 118/63, pulse 78, temperature 37.1 °C (98.8 °F), temperature source Temporal, resp. rate 18, height 1.753 m (5' 9.02\"), weight 72 kg (158 lb 11.7 oz), SpO2 98 %.  Intake/Output last 3 Shifts:  I/O last 3 completed shifts:  In: - (0 mL/kg)   Out: 200 (2.8 mL/kg) [Urine:200 (0.1 mL/kg/hr)]  Weight: 72 kg     Relevant Results  Labs: WBC 5.3, hemoglobin 11.9    MRI cervical spine  IMPRESSION:  The spinal cord has edema/myelomalacia centered at the disc  protrusion and endplate spurring at C4-5 compressing the cord and  severely stenosing the spinal canal, lateral recesses and foramina.    The C5 and C6 vertebral bodies are fused.    Additional degenerative changes elsewhere as noted.      Impression/plan:  88-year-old male who suffered a same level fall on December 18, 2023.  Identified injuries include:  1.  Left superior/inferior pubic rami fractures with small hematoma.  Patient is hemodynamically stable.  Hemoglobin 11.9.  Minimal change from yesterday.  Orthopedic surgery has evaluated and recommends nonoperative therapy.  2.  Left eyebrow abrasion.  3.  MRI cervical spine shows that the spinal cord has edema/myelomalacia centered at the disc  protrusion and endplate spurring at C4-5 compressing the cord and  severely stenosing the spinal canal, lateral recesses and foramina.  Neurosurgery has evaluated the patient.    Nothing to add from a general/trauma surgery " standpoint.  I will sign off of the patient for now.  Please call if I can be of assistance.    Calos Romero MD

## 2023-12-20 NOTE — CONSULTS
"Reason For Consult  The spinal cord has edema/myelomalacia centered at the disc protrusion and endplate spurring at C4-5 compressing the cord and severely stenosing the spinal canal, lateral recesses and foramina.     History Of Present Illness  Colton Jiménez is a 88 y.o. male presenting  to [facility] ED after falling while walking on his driveway on 12/18/2023, to check his mail. He was noted to have dysarthria in ED. CT C Spine 12/18/2023, demonstrates disc - osteophyte complexes at C4 - 5 and C5 - 6 to my review, with canal narrowing. MRI C Spine 12/19/2023, demonstrates similar findings to one year ago. CT T and L Spine demonstrate multilevel degenerative changes, post operative lumbar changes. MRI / MRA brain do not indicate cause for dysarthria (Neurology on board). He currently admits to frequent falls at home, imbalance (uses walls, furniture to maneuver through home), dropping items. No changes in bowel / bladder function (note urgency / frequency continues), saddle anesthesia.      CHART REVIEW: He was noted to have imbalance and kyphosis one year ago at [facility]. 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 There is subtle cord signal abnormality at C4-5\" (images are not available at this time). He was evaluated for this) - he was evaluated by Dr. Pond (Neurosurgery) in 12/2022 - it was noted that he did not have any myelopathic findings on exam, surgical intervention was recommended, but, because he was taking care of his wife at home (recent stroke), he declined. Urinary urgency / frequency was noted in 07/2023 during workup for pancytopenia. He has been evaluated by Dr. Juanito Moyer, remotely, with reported stable cervical spine x-rays in 2017. Dr. Moyer recommended surgical intervention.     Past Medical History  He has a past medical history of Personal history of malignant neoplasm, unspecified, Personal history of other diseases of the " "circulatory system, Personal history of other diseases of the digestive system, Personal history of other diseases of the digestive system, Personal history of other diseases of the musculoskeletal system and connective tissue, Personal history of other diseases of the musculoskeletal system and connective tissue, Personal history of other specified conditions, and Personal history of other specified conditions. Diagnosed with lumbar stenosis without neurogenic claudication in 2007, headaches, PUD, chronic back pain, BPH     Surgical History  He has a past surgical history that includes Other surgical history (09/11/2017); Colonoscopy (09/11/2017); Cholecystectomy (09/11/2017); Cataract extraction (09/11/2017); MR angio neck wo IV contrast (12/19/2023); and MR angio head wo IV contrast (12/19/2023). Previous L4 - S1 fusion, instrumentation noted on CT L Spine.     Social History  He has no history on file for tobacco use, alcohol use, and drug use. Nonsmoker    Family History  No family history on file. Mother: DM, HTN, stroke     Allergies  Patient has no known allergies.    Review of Systems  ROS x 10 is, otherwise, negative unless documented above in HPI     Physical Exam  Frail appearing, elderly male, resting in bed with cervical collar in place  A&O x 3, speech slow / clear, follows commands  Skin is warm / dry, no obvious lesions on exposed skin   Thorax is midline; chest expansion is symmetric, frequent throat clearing noted with answering questions  Abdomen is not distended  TOLEDO equally / with effort, right  is 4 / 5, left is 4+ / 5  BUE / BLE DTRs are 2+. NIKKI'S SIGN PRESENT BILATERALLY, no ankle clonus  Urinary diversion in place       Last Recorded Vitals  Blood pressure 172/79, pulse 75, temperature 36.4 °C (97.5 °F), temperature source Temporal, resp. rate 16, height 1.753 m (5' 9\"), weight 72 kg (158 lb 11.7 oz), SpO2 97 %.    Relevant Results  Results for orders placed or performed during the " hospital encounter of 12/18/23 (from the past 24 hour(s))   CBC   Result Value Ref Range    WBC 5.1 4.4 - 11.3 x10*3/uL    nRBC 0.0 0.0 - 0.0 /100 WBCs    RBC 3.66 (L) 4.50 - 5.90 x10*6/uL    Hemoglobin 12.1 (L) 13.5 - 17.5 g/dL    Hematocrit 35.8 (L) 41.0 - 52.0 %    MCV 98 80 - 100 fL    MCH 33.1 26.0 - 34.0 pg    MCHC 33.8 32.0 - 36.0 g/dL    RDW 13.4 11.5 - 14.5 %    Platelets 119 (L) 150 - 450 x10*3/uL   Magnesium   Result Value Ref Range    Magnesium 1.94 1.60 - 2.40 mg/dL   Basic metabolic panel   Result Value Ref Range    Glucose 99 74 - 99 mg/dL    Sodium 139 136 - 145 mmol/L    Potassium 4.2 3.5 - 5.3 mmol/L    Chloride 102 98 - 107 mmol/L    Bicarbonate 33 (H) 21 - 32 mmol/L    Anion Gap 8 (L) 10 - 20 mmol/L    Urea Nitrogen 17 6 - 23 mg/dL    Creatinine 0.66 0.50 - 1.30 mg/dL    eGFR >90 >60 mL/min/1.73m*2    Calcium 8.7 8.6 - 10.3 mg/dL      MRI C SPINE on 12/19/2023: images, to my review, demonstrate severe cervical canal narrowing with cord impingement / myelomalacia at C4 - 5 along with additional degenerative changes.     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  Discussed with Dr. Juanito Moyer:  - Surgical cervical decompression is recommended once again; however, require medical clearance prior to surgical consideration for scheduling. Patient does not decline surgical intervention when discussed as treatment recommendation.    I spent > 35 minutes in the professional and overall care of this patient.    Liliana Contreras, HOLLY-CNP

## 2023-12-20 NOTE — PROGRESS NOTES
Colton Jiménez is a 88 y.o. male on day 2 of admission presenting with Fall, initial encounter.      Subjective    The patient is an 88-year-old male with a history of multiple medical problems as well as cervical stenosis who was in his usual state of health until recently.  The patient was checking his mail and slipped on a driveway.  He did not hit his head or lose consciousness.  Currently the patient is doing very well.    Objective     Last Recorded Vitals  /79 (BP Location: Left arm, Patient Position: Lying)   Pulse 67   Temp 36.4 °C (97.5 °F) (Temporal)   Resp 16   Wt 72 kg (158 lb 11.7 oz)   SpO2 96%     The patient's mental status testing shows that the patient is alert and oriented ×3 with no evidence of any aphasia or dysarthria.  The patient's cranial nerve testing 2, 3, 4, 5, 6, and 7 are all within normal limits.  The patient's motor testing shows normal tone, bulk and power in the upper extremities and 5-/5 power in the lower extremities bilaterally.    Scheduled medications  acetaminophen, 975 mg, oral, q8h  amLODIPine, 2.5 mg, oral, Daily  aspirin, 81 mg, oral, Daily  atorvastatin, 20 mg, oral, Daily  dexAMETHasone, 4 mg, oral, Daily  docusate sodium, 100 mg, oral, BID  finasteride, 5 mg, oral, Daily  fluticasone, 2 spray, Each Nostril, Daily  gabapentin, 300 mg, oral, TID  lidocaine, 1 patch, transdermal, Daily  magnesium oxide, 400 mg, oral, Daily  pantoprazole, 40 mg, oral, Daily before breakfast  polyethylene glycol, 17 g, oral, Daily  sertraline, 100 mg, oral, Daily      Continuous medications     PRN medications  PRN medications: morphine, ondansetron, oxyCODONE, traMADol    Results for orders placed or performed during the hospital encounter of 12/18/23 (from the past 24 hour(s))   Magnesium   Result Value Ref Range    Magnesium 1.98 1.60 - 2.40 mg/dL   Basic Metabolic Panel   Result Value Ref Range    Glucose 105 (H) 74 - 99 mg/dL    Sodium 139 136 - 145 mmol/L    Potassium 4.2 3.5  - 5.3 mmol/L    Chloride 102 98 - 107 mmol/L    Bicarbonate 31 21 - 32 mmol/L    Anion Gap 10 10 - 20 mmol/L    Urea Nitrogen 23 6 - 23 mg/dL    Creatinine 0.60 0.50 - 1.30 mg/dL    eGFR >90 >60 mL/min/1.73m*2    Calcium 8.8 8.6 - 10.3 mg/dL   CBC   Result Value Ref Range    WBC 5.3 4.4 - 11.3 x10*3/uL    nRBC 0.0 0.0 - 0.0 /100 WBCs    RBC 3.69 (L) 4.50 - 5.90 x10*6/uL    Hemoglobin 11.9 (L) 13.5 - 17.5 g/dL    Hematocrit 36.5 (L) 41.0 - 52.0 %    MCV 99 80 - 100 fL    MCH 32.2 26.0 - 34.0 pg    MCHC 32.6 32.0 - 36.0 g/dL    RDW 13.5 11.5 - 14.5 %    Platelets 118 (L) 150 - 450 x10*3/uL     MR cervical spine wo IV contrast    Result Date: 12/19/2023  Interpreted By:  Lemuel Ruth, STUDY: MR CERVICAL SPINE WO IV CONTRAST;  12/19/2023 3:06 pm   INDICATION: Signs/Symptoms:Point tendernss, negative CT.   COMPARISON: None.   ACCESSION NUMBER(S): [id number]   ORDERING CLINICIAN: RICHARD HERNANDEZ   TECHNIQUE: Sagittal T1, T2, STIR, axial T1 and axial T2 weighted images were acquired through the cervical spine.   FINDINGS: The C5 and C6 vertebral bodies are fused.   3-4 mm anterolisthesis is noted at C7-T1, T1-2 and T2-3.   The central spinal cord has an approximately 1 cm length of hyperintensity consistent with edema/myelomalacia centered at a central C4-5 disc protrusion and endplate spurring. The spinal canal is severely stenosed at this level with deformity of the cord in the midline.   Otherwise no intrinsic abnormalities of the spinal cord are noted.   Craniocervical junction: No mass of the foramen magnum is noted. The atlanto odontoid articulation has moderate degenerative changes.   C2-3: The facet joints are moderately arthritic more so on the right. The disc bulges minimally with no significant stenosis.   C3-4: The disc bulges minimally with no significant stenosis. The facet joints are moderately arthritic worse on the right.   C4-5: The spinal canal, lateral recesses and to a lesser degree neural  foramina are severely stenosed secondary to central disc protrusion and uncovertebral spurring. The spinal cord is severely deformed centrally by the protrusion and endplate spurring.   C5-6: The spinal canal is mildly to moderately stenosed by disc bulge and uncovertebral spurring abutting and slightly flattening the cord. A large amount of CSF lies posterior to the cord at this level however.   C6-7: The disc bulges mildly with no significant stenosis.   C7-T1: No stenosis is noted.   T1-2: No stenosis is noted on the sagittal images.   T2-3: The neural foramina are mildly stenosed by facet hypertrophy on sagittal images bilaterally.   T3-4 and T4-5: No stenoses are noted on the sagittal images.         The spinal cord has edema/myelomalacia centered at the disc protrusion and endplate spurring at C4-5 compressing the cord and severely stenosing the spinal canal, lateral recesses and foramina.   The C5 and C6 vertebral bodies are fused.   Additional degenerative changes elsewhere as noted.   MACRO: None   Signed by: Lemuel Ruth 12/19/2023 3:35 PM Dictation workstation:   [id number]    MR brain wo IV contrast    Result Date: 12/19/2023  Interpreted By:  Lemuel Ruth, STUDY: MR BRAIN WO IV CONTRAST; MR ANGIO NECK WO IV CONTRAST; MR ANGIO HEAD WO IV CONTRAST;  12/19/2023 3:06 pm   INDICATION: Signs/Symptoms:TIA; Signs/Symptoms:tia.  Stroke protocol. Trauma to head, fell on ice striking head. Intermittent difficulty finding words. Questionable aphasia.   COMPARISON: 12/18/2023 head CT   ACCESSION NUMBER(S): [id number]; [id number]; [id number]   ORDERING CLINICIAN: BRISSA ENAMORADO   TECHNIQUE: Axial T2, FLAIR, DWI, gradient echo T2 and  sagittal and coronal T1 weighted images of brain were acquired.   Time-of-flight MRA of the head  and neck was performed. The images were reviewed as source images and maximum intensity projections. Carotid stenoses were determined using modified NASCET criteria.   FINDINGS: Brain:    CSF Spaces: The sulci and ventricles are normal for the patient's age similar to the prior exam.   Parenchyma: No acute infarct, hemorrhage or mass is noted.   The white matter, thalami and justice have focal to confluent nonspecific FLAIR hyperintensities corresponding to lucencies on the prior CT. The basal ganglia have numerous T2 hyperintensities related to dilated perivascular spaces, also similar to the prior exam.   Paranasal Sinuses and Mastoids: Visualized paranasal sinuses and mastoid air cells are unremarkable.   MRA of head:   Anterior circulation:    There is expected flow signal in bilateral intracranial internal carotid arteries, bilateral carotid terminals, bilateral proximal anterior and middle cerebral arteries.   Posterior circulation:    Bilateral intracranial vertebral arteries, vertebrobasilar junction, basilar artery and proximal posterior cerebral arteries demonstrate expected flow signal.   MRA of neck:   The source images are mildly degraded by artifact.   Right carotid vessels:  There is expected flow signal in the visualized portion of the common carotid artery.  There is mild attenuation of flow signal at the carotid bifurcation which may be secondary to flow related artifact. The right internal carotid bulb has less than 10% stenosis.   Left carotid vessels:   There is expected flow signal in the visualized portion of the common carotid artery.  There is mild attenuation of flow signal at the carotid bifurcation which may be secondary to flow related artifact. The left internal carotid bulb has less than 10% stenosis.   Vertebral vessels:   The visualized segments of the cervical vertebral arteries demonstrate expected flow signal.       MRI Brain:   No acute infarct, hemorrhage or mass is noted.   The parenchymal hyperintensities are nonspecific and may be secondary to chronic microvascular ischemic changes among others.   MRA:   No evidence of major vessel cutoff or significant stenosis  on MRA head and neck.   MACRO: None   Signed by: Lemuel Ruth 12/19/2023 3:30 PM Dictation workstation:   [id number]    MR angio neck wo IV contrast    Result Date: 12/19/2023  Interpreted By:  Lemuel Ruth, STUDY: MR BRAIN WO IV CONTRAST; MR ANGIO NECK WO IV CONTRAST; MR ANGIO HEAD WO IV CONTRAST;  12/19/2023 3:06 pm   INDICATION: Signs/Symptoms:TIA; Signs/Symptoms:tia.  Stroke protocol. Trauma to head, fell on ice striking head. Intermittent difficulty finding words. Questionable aphasia.   COMPARISON: 12/18/2023 head CT   ACCESSION NUMBER(S): [id number]; [id number]; [id number]   ORDERING CLINICIAN: BRISSA ENAMORADO   TECHNIQUE: Axial T2, FLAIR, DWI, gradient echo T2 and  sagittal and coronal T1 weighted images of brain were acquired.   Time-of-flight MRA of the head  and neck was performed. The images were reviewed as source images and maximum intensity projections. Carotid stenoses were determined using modified NASCET criteria.   FINDINGS: Brain:   CSF Spaces: The sulci and ventricles are normal for the patient's age similar to the prior exam.   Parenchyma: No acute infarct, hemorrhage or mass is noted.   The white matter, thalami and justice have focal to confluent nonspecific FLAIR hyperintensities corresponding to lucencies on the prior CT. The basal ganglia have numerous T2 hyperintensities related to dilated perivascular spaces, also similar to the prior exam.   Paranasal Sinuses and Mastoids: Visualized paranasal sinuses and mastoid air cells are unremarkable.   MRA of head:   Anterior circulation:    There is expected flow signal in bilateral intracranial internal carotid arteries, bilateral carotid terminals, bilateral proximal anterior and middle cerebral arteries.   Posterior circulation:    Bilateral intracranial vertebral arteries, vertebrobasilar junction, basilar artery and proximal posterior cerebral arteries demonstrate expected flow signal.   MRA of neck:   The source images are mildly degraded  by artifact.   Right carotid vessels:  There is expected flow signal in the visualized portion of the common carotid artery.  There is mild attenuation of flow signal at the carotid bifurcation which may be secondary to flow related artifact. The right internal carotid bulb has less than 10% stenosis.   Left carotid vessels:   There is expected flow signal in the visualized portion of the common carotid artery.  There is mild attenuation of flow signal at the carotid bifurcation which may be secondary to flow related artifact. The left internal carotid bulb has less than 10% stenosis.   Vertebral vessels:   The visualized segments of the cervical vertebral arteries demonstrate expected flow signal.       MRI Brain:   No acute infarct, hemorrhage or mass is noted.   The parenchymal hyperintensities are nonspecific and may be secondary to chronic microvascular ischemic changes among others.   MRA:   No evidence of major vessel cutoff or significant stenosis on MRA head and neck.   MACRO: None   Signed by: Lemuel Ruth 12/19/2023 3:30 PM Dictation workstation:   [id number]    MR angio head wo IV contrast    Result Date: 12/19/2023  Interpreted By:  Lemuel Ruth, STUDY: MR BRAIN WO IV CONTRAST; MR ANGIO NECK WO IV CONTRAST; MR ANGIO HEAD WO IV CONTRAST;  12/19/2023 3:06 pm   INDICATION: Signs/Symptoms:TIA; Signs/Symptoms:tia.  Stroke protocol. Trauma to head, fell on ice striking head. Intermittent difficulty finding words. Questionable aphasia.   COMPARISON: 12/18/2023 head CT   ACCESSION NUMBER(S): [id number]; [id number]; [id number]   ORDERING CLINICIAN: BRISSA ENAMORADO   TECHNIQUE: Axial T2, FLAIR, DWI, gradient echo T2 and  sagittal and coronal T1 weighted images of brain were acquired.   Time-of-flight MRA of the head  and neck was performed. The images were reviewed as source images and maximum intensity projections. Carotid stenoses were determined using modified NASCET criteria.   FINDINGS: Brain:   CSF  Spaces: The sulci and ventricles are normal for the patient's age similar to the prior exam.   Parenchyma: No acute infarct, hemorrhage or mass is noted.   The white matter, thalami and justice have focal to confluent nonspecific FLAIR hyperintensities corresponding to lucencies on the prior CT. The basal ganglia have numerous T2 hyperintensities related to dilated perivascular spaces, also similar to the prior exam.   Paranasal Sinuses and Mastoids: Visualized paranasal sinuses and mastoid air cells are unremarkable.   MRA of head:   Anterior circulation:    There is expected flow signal in bilateral intracranial internal carotid arteries, bilateral carotid terminals, bilateral proximal anterior and middle cerebral arteries.   Posterior circulation:    Bilateral intracranial vertebral arteries, vertebrobasilar junction, basilar artery and proximal posterior cerebral arteries demonstrate expected flow signal.   MRA of neck:   The source images are mildly degraded by artifact.   Right carotid vessels:  There is expected flow signal in the visualized portion of the common carotid artery.  There is mild attenuation of flow signal at the carotid bifurcation which may be secondary to flow related artifact. The right internal carotid bulb has less than 10% stenosis.   Left carotid vessels:   There is expected flow signal in the visualized portion of the common carotid artery.  There is mild attenuation of flow signal at the carotid bifurcation which may be secondary to flow related artifact. The left internal carotid bulb has less than 10% stenosis.   Vertebral vessels:   The visualized segments of the cervical vertebral arteries demonstrate expected flow signal.       MRI Brain:   No acute infarct, hemorrhage or mass is noted.   The parenchymal hyperintensities are nonspecific and may be secondary to chronic microvascular ischemic changes among others.   MRA:   No evidence of major vessel cutoff or significant stenosis on  MRA head and neck.   MACRO: None   Signed by: Lemuel Ruth 12/19/2023 3:30 PM Dictation workstation:   [id number]    CT thoracic spine wo IV contrast    Result Date: 12/18/2023  Interpreted By:  Minor Reddy, STUDY: CT CHEST ABDOMEN PELVIS W IV CONTRAST; CT LUMBAR SPINE WO IV CONTRAST; CT THORACIC SPINE WO IV CONTRAST;  12/18/2023 6:24 pm   INDICATION: Signs/Symptoms:Trauma; Signs/Symptoms:fall.   COMPARISON: CT abdomen pelvis 06/15/2021   ACCESSION NUMBER(S): [id number]; [id number]; [id number]   ORDERING CLINICIAN: AKSHAT PIKE   TECHNIQUE: CT of the chest, abdomen, and pelvis was performed.  Contiguous axial images were obtained at 3 mm slice thickness through the chest, abdomen and pelvis. Coronal and sagittal reconstructions at 3 mm slice thickness were performed. Multiplanar reconstructions were processed of the thoracic and lumbar spine on a separate workstation. 70 ml of contrast  were administered intravenously without immediate complication.   FINDINGS: CHEST:   LUNG/PLEURA/LARGE AIRWAYS: No endobronchial lesion is seen.   Minimal dependent atelectatic change of the left lower lobe. Subcentimeter calcified nodule the right lower lobe compatible with old granulomatous disease.   No pulmonary contusion.   VESSELS: No evidence of thoracic aortic injury. No evidence of thoracic   The main pulmonary artery and its branches are unremarkable with respect course, caliber, and contour   No significant coronary atherosclerotic calcifications are seen.   HEART: Heart size within normal limits. No pericardial effusion.   MEDIASTINUM AND MARILIA: No pathologic enlarged mediastinal lymph nodes by CT criteria. Subcentimeter mediastinal lymph nodes are noted, nonspecific and possibly reactive.   CHEST WALL AND LOWER NECK: No acute osseous abnormality. Multilevel presumed degenerative changes throughout the imaged spine. No acute soft tissue abnormality.   Thoracic spine:   No acute displaced fracture.Multilevel  bridging anterior osteophyte formation suggestive of diffuse idiopathic skeletal hyperostosis. Please note this predisposes the patient to increased risk of injury due to relatively minor trauma and there is a level of focal point tenderness, MRI may be performed for further assessment of occult injury.   ABDOMEN:   LIVER: No evidence of acute injury.   BILE DUCTS: Nonspecific pneumobilia, possibly related to prior procedure.   GALLBLADDER: No calcified gallstones.   PANCREAS: Unremarkable.   SPLEEN: No evidence of acute injury.   ADRENAL GLANDS: No evidence of acute injury.   KIDNEYS AND URETERS: No evidence of renal laceration or contusion. Nonobstructing bilateral nephrolithiasis. No obstructing urolithiasis.   PELVIS:   BLADDER: Decompressed and partially effaced on the left due to space of rates hematoma.   REPRODUCTIVE ORGANS: Prostate is enlarged at 5.6 x 4.8 cm.   BOWEL: No secondary signs of bowel wall injury. No pathologic distension of visualized large or small bowel.     VESSELS: There is no aneurysmal dilatation of the abdominal aorta. No evidence of abdominal aortic injury.   PERITONEUM/RETROPERITONEUM/LYMPH NODES: No free intraperitoneal gas. No enlarged mesenteric lymph nodes.   BONE AND SOFT TISSUE: There is a comminuted fracture of the left superior inferior pubic rami. There is associated left-sided space of Retzius hematoma with a small region of density (series 602, image 44) on the arterial phase exam which increases in density and conspicuity on the portal venous phase delay (series 702 image 44) suggestive of a small focus of active extravasation/active bleeding. This measures up to 0.6 x 0.3 cm on the arterial phase imaging and 1.4 x 0.5 cm on the portal venous phase imaging. .   Lumbar spine:   No acute displaced fracture.L4-S1 posterior fusion which appears similar to prior comparison imaging. Multilevel disc space narrowing. Multilevel facet arthropathy. Multilevel anterior osteophyte  formation. Multilevel vacuum disc phenomena.       CHEST: 1.  No CT evidence of acute intrathoracic injury. 2. No CT evidence of acute thoracic spine injury.   ABDOMEN-PELVIS: 1.  Acute, comminuted fractures of the left superior inferior pubic ramus with associated space of right sees hematoma along the left. There is a small region of density as described above that increases in conspicuity between the arterial and portal venous phase imaging compatible with a small focus of active bleeding within the region immediately superior to the left superior pubic ramus. 2. No acute osseous abnormality detected of the lumbar spine. 3. No CT evidence of acute solid organ injury.     MACRO: Minor Reddy discussed the significance and urgency of this critical finding via epic secure chat with  AKSHAT PIKE on 12/18/2023 at 7:09 pm.  (**-RCF-**) Findings:  See findings.     Signed by: Minor Reddy 12/18/2023 7:10 PM Dictation workstation:   [id number]    CT lumbar spine wo IV contrast    Result Date: 12/18/2023  Interpreted By:  Minor Reddy, STUDY: CT CHEST ABDOMEN PELVIS W IV CONTRAST; CT LUMBAR SPINE WO IV CONTRAST; CT THORACIC SPINE WO IV CONTRAST;  12/18/2023 6:24 pm   INDICATION: Signs/Symptoms:Trauma; Signs/Symptoms:fall.   COMPARISON: CT abdomen pelvis 06/15/2021   ACCESSION NUMBER(S): [id number]; [id number]; [id number]   ORDERING CLINICIAN: AKSHAT PIKE   TECHNIQUE: CT of the chest, abdomen, and pelvis was performed.  Contiguous axial images were obtained at 3 mm slice thickness through the chest, abdomen and pelvis. Coronal and sagittal reconstructions at 3 mm slice thickness were performed. Multiplanar reconstructions were processed of the thoracic and lumbar spine on a separate workstation. 70 ml of contrast  were administered intravenously without immediate complication.   FINDINGS: CHEST:   LUNG/PLEURA/LARGE AIRWAYS: No endobronchial lesion is seen.   Minimal dependent atelectatic change of the  left lower lobe. Subcentimeter calcified nodule the right lower lobe compatible with old granulomatous disease.   No pulmonary contusion.   VESSELS: No evidence of thoracic aortic injury. No evidence of thoracic   The main pulmonary artery and its branches are unremarkable with respect course, caliber, and contour   No significant coronary atherosclerotic calcifications are seen.   HEART: Heart size within normal limits. No pericardial effusion.   MEDIASTINUM AND MARILIA: No pathologic enlarged mediastinal lymph nodes by CT criteria. Subcentimeter mediastinal lymph nodes are noted, nonspecific and possibly reactive.   CHEST WALL AND LOWER NECK: No acute osseous abnormality. Multilevel presumed degenerative changes throughout the imaged spine. No acute soft tissue abnormality.   Thoracic spine:   No acute displaced fracture.Multilevel bridging anterior osteophyte formation suggestive of diffuse idiopathic skeletal hyperostosis. Please note this predisposes the patient to increased risk of injury due to relatively minor trauma and there is a level of focal point tenderness, MRI may be performed for further assessment of occult injury.   ABDOMEN:   LIVER: No evidence of acute injury.   BILE DUCTS: Nonspecific pneumobilia, possibly related to prior procedure.   GALLBLADDER: No calcified gallstones.   PANCREAS: Unremarkable.   SPLEEN: No evidence of acute injury.   ADRENAL GLANDS: No evidence of acute injury.   KIDNEYS AND URETERS: No evidence of renal laceration or contusion. Nonobstructing bilateral nephrolithiasis. No obstructing urolithiasis.   PELVIS:   BLADDER: Decompressed and partially effaced on the left due to space of rates hematoma.   REPRODUCTIVE ORGANS: Prostate is enlarged at 5.6 x 4.8 cm.   BOWEL: No secondary signs of bowel wall injury. No pathologic distension of visualized large or small bowel.     VESSELS: There is no aneurysmal dilatation of the abdominal aorta. No evidence of abdominal aortic injury.    PERITONEUM/RETROPERITONEUM/LYMPH NODES: No free intraperitoneal gas. No enlarged mesenteric lymph nodes.   BONE AND SOFT TISSUE: There is a comminuted fracture of the left superior inferior pubic rami. There is associated left-sided space of Retzius hematoma with a small region of density (series 602, image 44) on the arterial phase exam which increases in density and conspicuity on the portal venous phase delay (series 702 image 44) suggestive of a small focus of active extravasation/active bleeding. This measures up to 0.6 x 0.3 cm on the arterial phase imaging and 1.4 x 0.5 cm on the portal venous phase imaging. .   Lumbar spine:   No acute displaced fracture.L4-S1 posterior fusion which appears similar to prior comparison imaging. Multilevel disc space narrowing. Multilevel facet arthropathy. Multilevel anterior osteophyte formation. Multilevel vacuum disc phenomena.       CHEST: 1.  No CT evidence of acute intrathoracic injury. 2. No CT evidence of acute thoracic spine injury.   ABDOMEN-PELVIS: 1.  Acute, comminuted fractures of the left superior inferior pubic ramus with associated space of right sees hematoma along the left. There is a small region of density as described above that increases in conspicuity between the arterial and portal venous phase imaging compatible with a small focus of active bleeding within the region immediately superior to the left superior pubic ramus. 2. No acute osseous abnormality detected of the lumbar spine. 3. No CT evidence of acute solid organ injury.     MACRO: Minor Reddy discussed the significance and urgency of this critical finding via epic secure chat with  AKSHAT PIKE on 12/18/2023 at 7:09 pm.  (**-RCF-**) Findings:  See findings.     Signed by: Minor Reddy 12/18/2023 7:10 PM Dictation workstation:   [id number]    CT chest abdomen pelvis w IV contrast    Result Date: 12/18/2023  Interpreted By:  Minor Reddy, STUDY: CT CHEST ABDOMEN PELVIS W IV CONTRAST; CT  LUMBAR SPINE WO IV CONTRAST; CT THORACIC SPINE WO IV CONTRAST;  12/18/2023 6:24 pm   INDICATION: Signs/Symptoms:Trauma; Signs/Symptoms:fall.   COMPARISON: CT abdomen pelvis 06/15/2021   ACCESSION NUMBER(S): [id number]; [id number]; [id number]   ORDERING CLINICIAN: AKSHAT PIKE   TECHNIQUE: CT of the chest, abdomen, and pelvis was performed.  Contiguous axial images were obtained at 3 mm slice thickness through the chest, abdomen and pelvis. Coronal and sagittal reconstructions at 3 mm slice thickness were performed. Multiplanar reconstructions were processed of the thoracic and lumbar spine on a separate workstation. 70 ml of contrast  were administered intravenously without immediate complication.   FINDINGS: CHEST:   LUNG/PLEURA/LARGE AIRWAYS: No endobronchial lesion is seen.   Minimal dependent atelectatic change of the left lower lobe. Subcentimeter calcified nodule the right lower lobe compatible with old granulomatous disease.   No pulmonary contusion.   VESSELS: No evidence of thoracic aortic injury. No evidence of thoracic   The main pulmonary artery and its branches are unremarkable with respect course, caliber, and contour   No significant coronary atherosclerotic calcifications are seen.   HEART: Heart size within normal limits. No pericardial effusion.   MEDIASTINUM AND MARILIA: No pathologic enlarged mediastinal lymph nodes by CT criteria. Subcentimeter mediastinal lymph nodes are noted, nonspecific and possibly reactive.   CHEST WALL AND LOWER NECK: No acute osseous abnormality. Multilevel presumed degenerative changes throughout the imaged spine. No acute soft tissue abnormality.   Thoracic spine:   No acute displaced fracture.Multilevel bridging anterior osteophyte formation suggestive of diffuse idiopathic skeletal hyperostosis. Please note this predisposes the patient to increased risk of injury due to relatively minor trauma and there is a level of focal point tenderness, MRI may be performed  for further assessment of occult injury.   ABDOMEN:   LIVER: No evidence of acute injury.   BILE DUCTS: Nonspecific pneumobilia, possibly related to prior procedure.   GALLBLADDER: No calcified gallstones.   PANCREAS: Unremarkable.   SPLEEN: No evidence of acute injury.   ADRENAL GLANDS: No evidence of acute injury.   KIDNEYS AND URETERS: No evidence of renal laceration or contusion. Nonobstructing bilateral nephrolithiasis. No obstructing urolithiasis.   PELVIS:   BLADDER: Decompressed and partially effaced on the left due to space of rates hematoma.   REPRODUCTIVE ORGANS: Prostate is enlarged at 5.6 x 4.8 cm.   BOWEL: No secondary signs of bowel wall injury. No pathologic distension of visualized large or small bowel.     VESSELS: There is no aneurysmal dilatation of the abdominal aorta. No evidence of abdominal aortic injury.   PERITONEUM/RETROPERITONEUM/LYMPH NODES: No free intraperitoneal gas. No enlarged mesenteric lymph nodes.   BONE AND SOFT TISSUE: There is a comminuted fracture of the left superior inferior pubic rami. There is associated left-sided space of Retzius hematoma with a small region of density (series 602, image 44) on the arterial phase exam which increases in density and conspicuity on the portal venous phase delay (series 702 image 44) suggestive of a small focus of active extravasation/active bleeding. This measures up to 0.6 x 0.3 cm on the arterial phase imaging and 1.4 x 0.5 cm on the portal venous phase imaging. .   Lumbar spine:   No acute displaced fracture.L4-S1 posterior fusion which appears similar to prior comparison imaging. Multilevel disc space narrowing. Multilevel facet arthropathy. Multilevel anterior osteophyte formation. Multilevel vacuum disc phenomena.       CHEST: 1.  No CT evidence of acute intrathoracic injury. 2. No CT evidence of acute thoracic spine injury.   ABDOMEN-PELVIS: 1.  Acute, comminuted fractures of the left superior inferior pubic ramus with associated  space of right sees hematoma along the left. There is a small region of density as described above that increases in conspicuity between the arterial and portal venous phase imaging compatible with a small focus of active bleeding within the region immediately superior to the left superior pubic ramus. 2. No acute osseous abnormality detected of the lumbar spine. 3. No CT evidence of acute solid organ injury.     MACRO: Minor Reddy discussed the significance and urgency of this critical finding via epic secure chat with  AKSHAT PIKE on 12/18/2023 at 7:09 pm.  (**-RCF-**) Findings:  See findings.     Signed by: Minor Reddy 12/18/2023 7:10 PM Dictation workstation:   [id number]    CT cervical spine wo IV contrast    Result Date: 12/18/2023  Interpreted By:  Minor Reddy, STUDY: CT CERVICAL SPINE WO IV CONTRAST;  12/18/2023 6:24 pm   INDICATION: Signs/Symptoms:fall.   COMPARISON: None.   ACCESSION NUMBER(S): [id number]   ORDERING CLINICIAN: AKSHAT PIKE   TECHNIQUE: Axial CT images of the cervical spine are obtained. Axial, coronal and sagittal reconstructions are provided for review.   FINDINGS:     Fractures: There is no evidence for an acute fracture of the cervical spine.   Vertebral Alignment: No posttraumatic malalignment is detected.   Craniocervical Junction: The odontoid process and craniocervical junction are intact.   Vertebrae/Disc Spaces:  Multilevel disc space narrowing. Multilevel facet arthropathy Multilevel uncovertebral hypertrophy.Multilevel osteophyte formation.   Prevertebral/Paraspinal Soft Tissues: The prevertebral and paraspinal soft tissues are unremarkable.         Multilevel spondylosis without acute osseous abnormality of the cervical spine.   MACRO: None   Signed by: Minor Reddy 12/18/2023 6:37 PM Dictation workstation:   [id number]    CT head W O contrast trauma protocol    Result Date: 12/18/2023  Interpreted By:  Minor Reddy, STUDY: CT HEAD W/O CONTRAST TRAUMA  PROTOCOL; CT FACIAL BONES WO IV CONTRAST; 12/18/2023 6:24 pm   INDICATION: Signs/Symptoms:head injury, mental status change; Signs/Symptoms:fall, confusion.   COMPARISON: Head CT 02/06/2023   ACCESSION NUMBER(S): [id number]; [id number]   ORDERING CLINICIAN: AKSHAT PIKE   TECHNIQUE: Noncontrast volumetric CT scan of head and facial bones was performed. Angled reformats in brain and bone windows were generated. The images were reviewed in bone, brain, blood and soft tissue windows. Three-dimensional reformations were processed of the facial bones on a separate workstation.   FINDINGS: CSF Spaces: The ventricles, sulci and basal cisterns are within normal limits. There is no extraaxial fluid collection.   Parenchyma:  The grey-white differentiation is intact. There is no mass effect or midline shift.  There is no intracranial hemorrhage.   Calvarium: The calvarium is unremarkable.   Facial bones, paranasal sinuses and mastoids: No acute displaced fracture. Pterygoid plates are intact. Bilateral mandibular condyles are well situated. Orbits appear symmetric without evidence of retrobulbar hematoma.       No CT evidence of acute intracranial injury.   No CT evidence of acute displaced facial bone fracture.   MACRO: None     Signed by: Minor Reddy 12/18/2023 6:34 PM Dictation workstation:   [id number]    CT maxillofacial bones wo IV contrast    Result Date: 12/18/2023  Interpreted By:  Minor Reddy, STUDY: CT HEAD W/O CONTRAST TRAUMA PROTOCOL; CT FACIAL BONES WO IV CONTRAST; 12/18/2023 6:24 pm   INDICATION: Signs/Symptoms:head injury, mental status change; Signs/Symptoms:fall, confusion.   COMPARISON: Head CT 02/06/2023   ACCESSION NUMBER(S): [id number]; [id number]   ORDERING CLINICIAN: AKSHAT PIKE   TECHNIQUE: Noncontrast volumetric CT scan of head and facial bones was performed. Angled reformats in brain and bone windows were generated. The images were reviewed in bone, brain, blood and soft tissue  windows. Three-dimensional reformations were processed of the facial bones on a separate workstation.   FINDINGS: CSF Spaces: The ventricles, sulci and basal cisterns are within normal limits. There is no extraaxial fluid collection.   Parenchyma:  The grey-white differentiation is intact. There is no mass effect or midline shift.  There is no intracranial hemorrhage.   Calvarium: The calvarium is unremarkable.   Facial bones, paranasal sinuses and mastoids: No acute displaced fracture. Pterygoid plates are intact. Bilateral mandibular condyles are well situated. Orbits appear symmetric without evidence of retrobulbar hematoma.       No CT evidence of acute intracranial injury.   No CT evidence of acute displaced facial bone fracture.   MACRO: None     Signed by: Minor Reddy 12/18/2023 6:34 PM Dictation workstation:   [id number]      Assessment/Plan   The patient is stable from a neurological standpoint.  I did review neurosurgery's note.  The patient does need to follow-up with neurosurgery as an outpatient to pursue a cervical decompression.  The patient does need to wear at least a soft collar to prevent any significant flexion or extension of his neck.  The patient should continue all of his medicines and stroke risk factor modification.  I believe that the patient would benefit from a stay in a skilled nursing facility.  I discussed all these issues in detail with the patient and his family and answered all their questions.  I will sign off for now.  The patient will follow-up with neurosurgery as scheduled.    Angela Steven DO

## 2023-12-21 LAB
ANION GAP SERPL CALC-SCNC: 11 MMOL/L (ref 10–20)
BUN SERPL-MCNC: 33 MG/DL (ref 6–23)
CALCIUM SERPL-MCNC: 9.1 MG/DL (ref 8.6–10.3)
CHLORIDE SERPL-SCNC: 102 MMOL/L (ref 98–107)
CO2 SERPL-SCNC: 31 MMOL/L (ref 21–32)
CREAT SERPL-MCNC: 0.76 MG/DL (ref 0.5–1.3)
ERYTHROCYTE [DISTWIDTH] IN BLOOD BY AUTOMATED COUNT: 13.5 % (ref 11.5–14.5)
GFR SERPL CREATININE-BSD FRML MDRD: 88 ML/MIN/1.73M*2
GLUCOSE SERPL-MCNC: 110 MG/DL (ref 74–99)
HCT VFR BLD AUTO: 33.6 % (ref 41–52)
HGB BLD-MCNC: 11.2 G/DL (ref 13.5–17.5)
MAGNESIUM SERPL-MCNC: 2 MG/DL (ref 1.6–2.4)
MCH RBC QN AUTO: 32.9 PG (ref 26–34)
MCHC RBC AUTO-ENTMCNC: 33.3 G/DL (ref 32–36)
MCV RBC AUTO: 99 FL (ref 80–100)
NRBC BLD-RTO: 0 /100 WBCS (ref 0–0)
PLATELET # BLD AUTO: 122 X10*3/UL (ref 150–450)
POTASSIUM SERPL-SCNC: 4.6 MMOL/L (ref 3.5–5.3)
RBC # BLD AUTO: 3.4 X10*6/UL (ref 4.5–5.9)
SODIUM SERPL-SCNC: 139 MMOL/L (ref 136–145)
WBC # BLD AUTO: 5.6 X10*3/UL (ref 4.4–11.3)

## 2023-12-21 PROCEDURE — 85027 COMPLETE CBC AUTOMATED: CPT

## 2023-12-21 PROCEDURE — 97116 GAIT TRAINING THERAPY: CPT | Mod: GP,CQ

## 2023-12-21 PROCEDURE — 97535 SELF CARE MNGMENT TRAINING: CPT | Mod: GP,CQ

## 2023-12-21 PROCEDURE — 2500000001 HC RX 250 WO HCPCS SELF ADMINISTERED DRUGS (ALT 637 FOR MEDICARE OP)

## 2023-12-21 PROCEDURE — 92526 ORAL FUNCTION THERAPY: CPT | Mod: GN

## 2023-12-21 PROCEDURE — 2500000004 HC RX 250 GENERAL PHARMACY W/ HCPCS (ALT 636 FOR OP/ED)

## 2023-12-21 PROCEDURE — 99231 SBSQ HOSP IP/OBS SF/LOW 25: CPT | Performed by: NURSE PRACTITIONER

## 2023-12-21 PROCEDURE — 83735 ASSAY OF MAGNESIUM: CPT

## 2023-12-21 PROCEDURE — 2500000005 HC RX 250 GENERAL PHARMACY W/O HCPCS

## 2023-12-21 PROCEDURE — 80048 BASIC METABOLIC PNL TOTAL CA: CPT

## 2023-12-21 PROCEDURE — 99232 SBSQ HOSP IP/OBS MODERATE 35: CPT | Performed by: INTERNAL MEDICINE

## 2023-12-21 PROCEDURE — 2500000004 HC RX 250 GENERAL PHARMACY W/ HCPCS (ALT 636 FOR OP/ED): Performed by: INTERNAL MEDICINE

## 2023-12-21 PROCEDURE — 96372 THER/PROPH/DIAG INJ SC/IM: CPT

## 2023-12-21 PROCEDURE — 1200000002 HC GENERAL ROOM WITH TELEMETRY DAILY

## 2023-12-21 PROCEDURE — 36415 COLL VENOUS BLD VENIPUNCTURE: CPT

## 2023-12-21 RX ORDER — SENNOSIDES 8.6 MG/1
2 TABLET ORAL 2 TIMES DAILY
Status: DISCONTINUED | OUTPATIENT
Start: 2023-12-21 | End: 2023-12-23 | Stop reason: HOSPADM

## 2023-12-21 RX ORDER — ENOXAPARIN SODIUM 100 MG/ML
40 INJECTION SUBCUTANEOUS DAILY
Status: DISCONTINUED | OUTPATIENT
Start: 2023-12-21 | End: 2023-12-23 | Stop reason: HOSPADM

## 2023-12-21 RX ADMIN — ASPIRIN 81 MG: 81 TABLET, COATED ORAL at 09:30

## 2023-12-21 RX ADMIN — SENNOSIDES 17.2 MG: 8.6 TABLET, FILM COATED ORAL at 20:09

## 2023-12-21 RX ADMIN — MAGNESIUM OXIDE TAB 400 MG (241.3 MG ELEMENTAL MG) 400 MG: 400 (241.3 MG) TAB at 09:25

## 2023-12-21 RX ADMIN — FINASTERIDE 5 MG: 5 TABLET, FILM COATED ORAL at 09:30

## 2023-12-21 RX ADMIN — ACETAMINOPHEN 975 MG: 325 TABLET ORAL at 20:20

## 2023-12-21 RX ADMIN — POLYETHYLENE GLYCOL 3350 17 G: 17 POWDER, FOR SOLUTION ORAL at 09:23

## 2023-12-21 RX ADMIN — DOCUSATE SODIUM 100 MG: 100 CAPSULE, LIQUID FILLED ORAL at 20:09

## 2023-12-21 RX ADMIN — GABAPENTIN 300 MG: 300 CAPSULE ORAL at 13:02

## 2023-12-21 RX ADMIN — ATORVASTATIN CALCIUM 20 MG: 40 TABLET, FILM COATED ORAL at 09:30

## 2023-12-21 RX ADMIN — FLUTICASONE PROPIONATE 2 SPRAY: 50 SPRAY, METERED NASAL at 09:25

## 2023-12-21 RX ADMIN — PANTOPRAZOLE SODIUM 40 MG: 40 TABLET, DELAYED RELEASE ORAL at 05:12

## 2023-12-21 RX ADMIN — GABAPENTIN 300 MG: 300 CAPSULE ORAL at 05:12

## 2023-12-21 RX ADMIN — DEXAMETHASONE 4 MG: 4 TABLET ORAL at 09:25

## 2023-12-21 RX ADMIN — DOCUSATE SODIUM 100 MG: 100 CAPSULE, LIQUID FILLED ORAL at 09:25

## 2023-12-21 RX ADMIN — AMLODIPINE BESYLATE 2.5 MG: 5 TABLET ORAL at 09:26

## 2023-12-21 RX ADMIN — GABAPENTIN 300 MG: 300 CAPSULE ORAL at 20:09

## 2023-12-21 RX ADMIN — ACETAMINOPHEN 975 MG: 325 TABLET ORAL at 13:20

## 2023-12-21 RX ADMIN — SERTRALINE HYDROCHLORIDE 100 MG: 100 TABLET ORAL at 09:30

## 2023-12-21 RX ADMIN — LIDOCAINE 1 PATCH: 4 PATCH TOPICAL at 09:25

## 2023-12-21 RX ADMIN — ACETAMINOPHEN 975 MG: 325 TABLET ORAL at 05:11

## 2023-12-21 RX ADMIN — ENOXAPARIN SODIUM 40 MG: 40 INJECTION SUBCUTANEOUS at 13:02

## 2023-12-21 ASSESSMENT — COGNITIVE AND FUNCTIONAL STATUS - GENERAL
TURNING FROM BACK TO SIDE WHILE IN FLAT BAD: A LITTLE
MOVING TO AND FROM BED TO CHAIR: A LOT
CLIMB 3 TO 5 STEPS WITH RAILING: TOTAL
EATING MEALS: A LITTLE
PERSONAL GROOMING: A LITTLE
STANDING UP FROM CHAIR USING ARMS: A LOT
WALKING IN HOSPITAL ROOM: A LOT
HELP NEEDED FOR BATHING: A LOT
TOILETING: A LOT
MOVING FROM LYING ON BACK TO SITTING ON SIDE OF FLAT BED WITH BEDRAILS: A LITTLE
STANDING UP FROM CHAIR USING ARMS: A LOT
WALKING IN HOSPITAL ROOM: A LOT
DRESSING REGULAR LOWER BODY CLOTHING: A LOT
DAILY ACTIVITIY SCORE: 14
TURNING FROM BACK TO SIDE WHILE IN FLAT BAD: A LITTLE
MOBILITY SCORE: 13
DRESSING REGULAR UPPER BODY CLOTHING: A LOT
MOVING TO AND FROM BED TO CHAIR: A LOT
CLIMB 3 TO 5 STEPS WITH RAILING: TOTAL
MOBILITY SCORE: 13
MOVING FROM LYING ON BACK TO SITTING ON SIDE OF FLAT BED WITH BEDRAILS: A LITTLE

## 2023-12-21 ASSESSMENT — PAIN SCALES - GENERAL
PAINLEVEL_OUTOF10: 0 - NO PAIN

## 2023-12-21 ASSESSMENT — PAIN - FUNCTIONAL ASSESSMENT
PAIN_FUNCTIONAL_ASSESSMENT: 0-10

## 2023-12-21 NOTE — PROGRESS NOTES
Physical Therapy    Physical Therapy Treatment    Patient Name: Colton Jiménez  MRN: [id number]  Today's Date: 12/21/2023  Time Calculation  Start Time: 1110  Stop Time: 1135  Time Calculation (min): 25 min       Assessment/Plan   PT Assessment  End of Session Communication: PCT/TIFFANI/ADRIA  End of Session Patient Position:  (pt on BSC with call button in reach; communicated with nursing staff.)     PT Plan  Treatment/Interventions: Bed mobility, Transfer training, Gait training, Balance training, Therapeutic exercise, Therapeutic activity  PT Plan: Skilled PT  PT Frequency: 3 times per week  PT Discharge Recommendations: Moderate intensity level of continued care  PT - OK to Discharge: Yes (to next leve of care when cleared by medical team)      General Visit Information:   PT  Visit  PT Received On: 12/21/23  General  Prior to Session Communication: Bedside nurse, PCT/TIFFANI/ADRIA  Patient Position Received: Bed, 2 rail up, Alarm on  General Comment:  (pt pleasant and motivated to participate in therapy session)    Subjective   Precautions:  Precautions  LE Weight Bearing Status:  (WBAT)  Medical Precautions: Fall precautions (C-spine precautions)  Braces Applied:  (cervical collar on at all times)  Precautions Comment: external catheter       Objective   Pain:  Pain Assessment  Pain Assessment: 0-10  Pain Score: 0 - No pain     Treatments:  Bed Mobility  Bed Mobility:  (sup > sit with CGA using bedrail to assist)    Ambulation/Gait Training  Ambulation/Gait Training Performed:  (pt ambulates 20 ft x 2 as well as 4 ft bed > BSC after seated rest break; FWW and mod A x 1.  unsteady with 2 episodes LOB requiring mod A to recover.  VC for maintaining proper FWW positioning throughout.)  pt also able to maintain additional 2 trials static stance 1-2 min. each, FWW and min A.     Transfers  Transfer:  (sit <> stand x 2 trials with FWW, mod A x 2 progressing to mod A x 1.  VC for safe hand placement and sequencing.)    Outcome  Measures:  [facility] Basic Mobility  Turning from your back to your side while in a flat bed without using bedrails: A little  Moving from lying on your back to sitting on the side of a flat bed without using bedrails: A little  Moving to and from bed to chair (including a wheelchair): A lot  Standing up from a chair using your arms (e.g. wheelchair or bedside chair): A lot  To walk in hospital room: A lot  Climbing 3-5 steps with railing: Total  Basic Mobility - Total Score: 13    Education Documentation  Mobility Training, taught by Ivis Melo PTA at 12/21/2023  3:00 PM.  Learner: Patient  Readiness: Acceptance  Method: Explanation  Response: Verbalizes Understanding, Needs Reinforcement    Education Comments  No comments found.        EDUCATION:       Encounter Problems       Encounter Problems (Active)       PT Problem       bed mobility  (Progressing)       Start:  12/20/23    Expected End:  01/03/24       Sba supine<>sit          transfers  (Progressing)       Start:  12/20/23    Expected End:  01/03/24       Cga sit<>stand w/ ww         gait   (Progressing)       Start:  12/20/23    Expected End:  01/03/24       Cga >=40ftx2 w/ use of ww         balance   (Progressing)       Start:  12/20/23    Expected End:  01/03/24       Sba maintaining sitting supported balance while performing >=10reps x2 sets ble therex to facilitate inc fxl mobility& gait stability

## 2023-12-21 NOTE — PROGRESS NOTES
"Colton Jiménez is a 88 y.o. male on day 3 of admission presenting with Fall, initial encounter.    SUBJECTIVE  Patient has been constipated since arrival on the 18th however he is normally constipated every 2-3 days.  Patient's neck pain is about the same and is only painful when he has movement.  I educated on trying to avoid hyperextension.  Patient denies fevers, chills, nausea, vomiting.    OBJECTIVE    Physical Exam:  General: Laying in bed with c-collar   HEENT: Laceration across left eyebrow noted  Chest:  Clear to auscultation bilaterally  CV:  Regular rate and rhythm.    Extremities:  No lower extremity edema or cyanosis.   Neurological:  AAOx3.   Skin:  Warm and dry.      Last Recorded Vitals  Blood pressure 118/68, pulse 76, temperature 36.4 °C (97.5 °F), resp. rate 18, height 1.753 m (5' 9.02\"), weight 72 kg (158 lb 11.7 oz), SpO2 97 %.  Intake/Output last 3 Shifts:  I/O last 3 completed shifts:  In: 240 (3.3 mL/kg) [P.O.:240]  Out: 500 (6.9 mL/kg) [Urine:500 (0.2 mL/kg/hr)]  Weight: 72 kg     Last CBC & BMP  Lab Results   Component Value Date    GLUCOSE 110 (H) 12/21/2023    CALCIUM 9.1 12/21/2023     12/21/2023    K 4.6 12/21/2023    CO2 31 12/21/2023     12/21/2023    BUN 33 (H) 12/21/2023    CREATININE 0.76 12/21/2023     Lab Results   Component Value Date    WBC 5.6 12/21/2023    HGB 11.2 (L) 12/21/2023    HCT 33.6 (L) 12/21/2023    MCV 99 12/21/2023     (L) 12/21/2023       ASSESSMENT & PLAN  Colton Jiménez is a 88 y.o. male with past medical history of Hx TIA, Hx DVT, HTN, HLD who presents to the hospital with complaints of fall. Patient states he went out to check his mail and slipped on the driveway. He did strike his head during the fall. He denies LOC. He states he has generally felt well before the fall. His COVID 19 PCR is positive. Looking at the chart looks like he had a symptomatic COVID 19 infection a few weeks ago and was treated with Paxlovid. Patient himself does not " recall this but is clearly documented by his PCP.     Daily progress:  12/21-pending SNF placement, constipated since arrival added senna, discontinued pain medications and continued scheduled Tylenol    #Mechanical Fall  #Pubic Rami Fracture w/ hematoma 2/2 above   #L scalp laceration 2/2 above  -Small region of active bleed and hematoma immediately superior to pubic ramus fracture  -CT head/C-spine shows no brain hemorrhage, fractures  -MRI head and neck shows no acute infarct, hemorrhage or mass noted  -Neck pain likely muscular  Plan  -Scheduled Tylenol, discontinued pain medications  -PT/OT, speech therapy, social service following  -Ortho following, no surgical intervention at this time    #Severe cervical stenosis  -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 centered at disc protrusion and endplate spurring at C4-5 compressing the cord and is severely stenosing with spinal canal  Plan  -Neurosurgery following plan for surgical intervention on 1/9/2024      #COVID 19 + pcr - false positive   -CXR shows no focal lung consolidation or effusion, asymptomatic will monitor      #HTN  #HLD  #Depression/Anxiety   #Hx TIA  #Hx pVT   -Resume home amlodipine, atorvastatin, no satellite, sertraline, aspirin, finasteride        Inpatient Checklist  Code Status: Full  DVT prophylaxis: SCDs, Lovenox  Lines/Drains/Tubes: IV peripheral  Diet: Regular  Disposition ->Destination: Medicine, likely SNF  ->DC Medications/Needs/Follow-ups: Follow-up with neurology in 4 months, follow-up with orthopedics in 10-14 days, neurosurgery surgical intervention planned for 1/9/2024      Juan J Tejeda,   PGY-1, Internal Medicine  Please SecureChat for any further questions  This is a preliminary note, please await attending attestation for final A/P'

## 2023-12-21 NOTE — CARE PLAN
The patient's goals for the shift include  rest    The clinical goals for the shift include pt will remain safe and free from injury      Problem: Nutrition  Goal: Less than 5 days NPO/clear liquids  Outcome: Progressing  Goal: Oral intake greater than 50%  Outcome: Progressing  Goal: Oral intake greater 75%  Outcome: Progressing  Goal: Consume prescribed supplement  Outcome: Progressing  Goal: Adequate PO fluid intake  Outcome: Progressing  Goal: Nutrition support goals are met within 48 hrs  Outcome: Progressing  Goal: Nutrition support is meeting 75% of nutrient needs  Outcome: Progressing  Goal: Tube feed tolerance  Outcome: Progressing  Goal: BG  mg/dL  Outcome: Progressing  Goal: Lab values WNL  Outcome: Progressing  Goal: Electrolytes WNL  Outcome: Progressing  Goal: Promote healing  Outcome: Progressing  Goal: Maintain stable weight  Outcome: Progressing  Goal: Reduce weight from edema/fluid  Outcome: Progressing  Goal: Gradual weight gain  Outcome: Progressing  Goal: Improve ostomy output  Outcome: Progressing     Problem: Pain - Adult  Goal: Verbalizes/displays adequate comfort level or baseline comfort level  Outcome: Progressing     Problem: Safety - Adult  Goal: Free from fall injury  Outcome: Progressing     Problem: Discharge Planning  Goal: Discharge to home or other facility with appropriate resources  Outcome: Progressing     Problem: Chronic Conditions and Co-morbidities  Goal: Patient's chronic conditions and co-morbidity symptoms are monitored and maintained or improved  Outcome: Progressing     Problem: Skin  Goal: Decreased wound size/increased tissue granulation at next dressing change  Outcome: Progressing  Flowsheets (Taken 12/20/2023 2220)  Decreased wound size/increased tissue granulation at next dressing change: Promote sleep for wound healing  Goal: Participates in plan/prevention/treatment measures  Outcome: Progressing  Flowsheets (Taken 12/20/2023 2220)  Participates in  plan/prevention/treatment measures: Elevate heels  Goal: Prevent/manage excess moisture  Outcome: Progressing  Flowsheets (Taken 12/20/2023 2220)  Prevent/manage excess moisture: Moisturize dry skin  Goal: Prevent/minimize sheer/friction injuries  Outcome: Progressing  Flowsheets (Taken 12/20/2023 2220)  Prevent/minimize sheer/friction injuries: Turn/reposition every 2 hours/use positioning/transfer devices  Goal: Promote/optimize nutrition  Outcome: Progressing  Flowsheets (Taken 12/20/2023 2220)  Promote/optimize nutrition:   Assist with feeding   Offer water/supplements/favorite foods  Goal: Promote skin healing  Outcome: Progressing  Flowsheets (Taken 12/20/2023 2220)  Promote skin healing: Turn/reposition every 2 hours/use positioning/transfer devices     Problem: Pain  Goal: Takes deep breaths with improved pain control throughout the shift  Outcome: Progressing  Goal: Turns in bed with improved pain control throughout the shift  Outcome: Progressing  Goal: Walks with improved pain control throughout the shift  Outcome: Progressing  Goal: Performs ADL's with improved pain control throughout shift  Outcome: Progressing  Goal: Participates in PT with improved pain control throughout the shift  Outcome: Progressing  Goal: Free from opioid side effects throughout the shift  Outcome: Progressing  Goal: Free from acute confusion related to pain meds throughout the shift  Outcome: Progressing     Problem: Fall/Injury  Goal: Not fall by end of shift  Outcome: Progressing  Goal: Be free from injury by end of the shift  Outcome: Progressing  Goal: Verbalize understanding of personal risk factors for fall in the hospital  Outcome: Progressing  Goal: Verbalize understanding of risk factor reduction measures to prevent injury from fall in the home  Outcome: Progressing  Goal: Use assistive devices by end of the shift  Outcome: Progressing  Goal: Pace activities to prevent fatigue by end of the shift  Outcome: Progressing

## 2023-12-21 NOTE — PROGRESS NOTES
MALATHI met with pt at pt bedside. Pt alert and oriented. SW discussed dc plan and that [facility] needs financial info for pts Wife's stay. SW explained that they can put them in the same room together per admissions staff. Pt had questions about [facility] care. SW recommended an onsite with the facility so that they can explain their services and financials in person. Pt requested an onsite. MALATHI notified Admissions and Liaison Shaquille with [facility] of pt request. SW will follow up.    DOUGLAS YOUNGER    UPDATED NOTE 11AM  Liliya from [facility] coming to onsite with pt. MALATHI notified pt.    DOUGLAS YOUNGER    UPDATED NOTE 1:02PM  SW received call from Liliya with Admissions at [facility] and explained that pt did not understand why his Wife would not receive coverage for a skilled nursing stay. Liliya discussed that pts Wife may be at baseline for this form of care and would need LTC/Intermediate care at [facility]. Per Liliya, she explained to pt that they could be in the same room and cared for together while pt receives needed therapies. Per Liliya, she reviewed pricing of 30 day stay for pt wife and pt explained that he was not providing his financial information and was not paying [facility] at this time. Pt is interested in [facility] facility of [facility]). SW entered referral and discussed with Liliya Admissions at [facility] because she performed the onsite.    UPDATED NOTE 2:50PM  SW spoke with attending regarding pt dc panning concerns. SW will speak with attending and pt in at bedside.     DOUGLAS YOUNGER    UPDATED NOTE 3PM  MALATHI met with Attending (Dr. Coombs) pt, and pt Daughter Vivienne, and Vivienne's  in pt room at pt bedside. Dr. Coombs explained to pt the options for dc planning in detail. SW provided support.  also discussed pts progress throughout hospital stay and that pt will be ready to leave for a facility tomorrow.  also discussed pts Wife's condition and treatment needs  at this time. Medicaid assistance was discussed with pt by MALATHI and Dr. Coombs. MALATHI will follow up with [facility] and [facility] for confirmation of private pay pricing and discuss with pt. After meeting, MALATHI updated Liliya with [facility] and requested pricing from [facility]. MALATHI will follow up.    DOUGLAS YOUNGER

## 2023-12-21 NOTE — PROGRESS NOTES
Speech-Language Pathology    Inpatient  Speech-Language Pathology Treatment     Patient Name: Colton Jiménez  MRN: [id number]  Today's Date: 12/21/2023  Time Calculation  Start Time: 1320  Stop Time: 1340  Time Calculation (min): 20 min         Current Problem:   1. Fall, initial encounter  Referral to [facility]      2. Injury of head, initial encounter        3. Closed fracture of multiple rami of left pubis, initial encounter (CMS/[facility])  Referral to [facility]            SLP Assessment:  SLP TX Intervention Outcome: Making Progress Towards Goals  Treatment Provided: Yes , good PO tolerance across a salad, thins via straw sequential sips, and fruit-cookie dipped in yogurt.   Medical Staff Made Aware: Yes       Plan:  Inpatient/Swing Bed or Outpatient: Inpatient  SLP Frequency: 1x per week  Duration: 1 week  SLP Discharge Recommendations: Skilled nursing facility placement  Discussed POC: Patient  SLP - OK to Discharge: Yes       Goals:  1. Pt will consume prescribed diet of RG7/TNO without overt s/sx aspiration/penetration >95% of the time.  GOAL MET 12/21/23  2. Pt/family education  GOAL MET 12/21/23      Subjective , Alert, Ox4, clear speech , mild [place].  Follows commands.   No e/o aphasia or stroke causing fall. Speech fluent       Most Recent Visit:  SLP Received On: 12/21/23    General Visit Information:      Pt on RA with stable respiratory skills.  Vocal quality clear with no c/o dysphagia s/p fall in the setting of cervical spine spurring and DJD.     Pain Assessment:   Pain Assessment: 0-10  Pain Score: 0 - No pain      Therapeutic Swallow:  Therapeutic Swallow Intervention : PO Trials    Pt demonstrating adequate oral phase of the swallow and pharyngeal phase across hard and soft solids plus thin liquids .  No clinical s/s of aspiration . Pt tolerating the LRD of RG7/TNO    Inpatient:  Education Documentation    Education provided regarding s/s of cervical dysphagia .   Pt denies any current swallowing  issues.  Discharge plan to a SNF with wife who pt recently did care for in the home.         Consultations/Referrals/Coordination of Services: Neurosurgery following with spinal sx plan for the future.

## 2023-12-21 NOTE — PROGRESS NOTES
"Colton Jiménez is an 88 y.o. male on day 3 of admission presenting with Fall, initial encounter.    Subjective   No adverse events overnight. Much more alert this morning       Objective   Cervical collar in place  Physical Exam  A&O x 3, speech is clear / fluent,   [place] in bed, continues with bilateral Romero's sign   No incontinence    Last Recorded Vitals  Blood pressure 134/71, pulse 68, temperature 36.1 °C (97 °F), temperature source Temporal, resp. rate 18, height 1.753 m (5' 9.02\"), weight 72 kg (158 lb 11.7 oz), SpO2 98 %.  Intake/Output last 3 Shifts:  I/O last 3 completed shifts:  In: - (0 mL/kg)   Out: 200 (2.8 mL/kg) [Urine:200 (0.1 mL/kg/hr)]  Weight: 72 kg        Assessment/Plan   Principal Problem:    Fall, initial encounter  Cervical myelopathy with cervical cord myelomalacia  - Continue cervical collar (discussed rationale for collar at length with patient this morning)  - Surgical planning is underway. Prefer to do electively. He has appointment with Dr. Moyer on 01/09/2024 (this is noted in his AVS).     I spent > 20 minutes in the professional and overall care of this patient.    Liliana Contreras, APRN-CNP      "

## 2023-12-22 VITALS
TEMPERATURE: 98.8 F | RESPIRATION RATE: 18 BRPM | SYSTOLIC BLOOD PRESSURE: 130 MMHG | BODY MASS INDEX: 23.51 KG/M2 | DIASTOLIC BLOOD PRESSURE: 67 MMHG | HEIGHT: 69 IN | WEIGHT: 158.73 LBS | OXYGEN SATURATION: 97 % | HEART RATE: 89 BPM

## 2023-12-22 LAB
ANION GAP SERPL CALC-SCNC: 9 MMOL/L (ref 10–20)
BUN SERPL-MCNC: 33 MG/DL (ref 6–23)
CALCIUM SERPL-MCNC: 8.9 MG/DL (ref 8.6–10.3)
CHLORIDE SERPL-SCNC: 102 MMOL/L (ref 98–107)
CO2 SERPL-SCNC: 31 MMOL/L (ref 21–32)
CREAT SERPL-MCNC: 0.44 MG/DL (ref 0.5–1.3)
ERYTHROCYTE [DISTWIDTH] IN BLOOD BY AUTOMATED COUNT: 13.4 % (ref 11.5–14.5)
GFR SERPL CREATININE-BSD FRML MDRD: >90 ML/MIN/1.73M*2
GLUCOSE SERPL-MCNC: 95 MG/DL (ref 74–99)
HCT VFR BLD AUTO: 31.7 % (ref 41–52)
HGB BLD-MCNC: 10.8 G/DL (ref 13.5–17.5)
MAGNESIUM SERPL-MCNC: 1.88 MG/DL (ref 1.6–2.4)
MCH RBC QN AUTO: 32.9 PG (ref 26–34)
MCHC RBC AUTO-ENTMCNC: 34.1 G/DL (ref 32–36)
MCV RBC AUTO: 97 FL (ref 80–100)
NRBC BLD-RTO: 0 /100 WBCS (ref 0–0)
PLATELET # BLD AUTO: 117 X10*3/UL (ref 150–450)
POTASSIUM SERPL-SCNC: 4.3 MMOL/L (ref 3.5–5.3)
RBC # BLD AUTO: 3.28 X10*6/UL (ref 4.5–5.9)
SODIUM SERPL-SCNC: 138 MMOL/L (ref 136–145)
WBC # BLD AUTO: 5.7 X10*3/UL (ref 4.4–11.3)

## 2023-12-22 PROCEDURE — 36415 COLL VENOUS BLD VENIPUNCTURE: CPT

## 2023-12-22 PROCEDURE — 99232 SBSQ HOSP IP/OBS MODERATE 35: CPT | Performed by: INTERNAL MEDICINE

## 2023-12-22 PROCEDURE — 80048 BASIC METABOLIC PNL TOTAL CA: CPT

## 2023-12-22 PROCEDURE — 82565 ASSAY OF CREATININE: CPT

## 2023-12-22 PROCEDURE — 2500000004 HC RX 250 GENERAL PHARMACY W/ HCPCS (ALT 636 FOR OP/ED): Performed by: INTERNAL MEDICINE

## 2023-12-22 PROCEDURE — 83735 ASSAY OF MAGNESIUM: CPT

## 2023-12-22 PROCEDURE — 2500000004 HC RX 250 GENERAL PHARMACY W/ HCPCS (ALT 636 FOR OP/ED)

## 2023-12-22 PROCEDURE — 2500000005 HC RX 250 GENERAL PHARMACY W/O HCPCS

## 2023-12-22 PROCEDURE — 85027 COMPLETE CBC AUTOMATED: CPT

## 2023-12-22 PROCEDURE — 2500000002 HC RX 250 W HCPCS SELF ADMINISTERED DRUGS (ALT 637 FOR MEDICARE OP, ALT 636 FOR OP/ED)

## 2023-12-22 PROCEDURE — 2500000001 HC RX 250 WO HCPCS SELF ADMINISTERED DRUGS (ALT 637 FOR MEDICARE OP)

## 2023-12-22 RX ADMIN — GABAPENTIN 300 MG: 300 CAPSULE ORAL at 04:57

## 2023-12-22 RX ADMIN — AMLODIPINE BESYLATE 2.5 MG: 5 TABLET ORAL at 09:13

## 2023-12-22 RX ADMIN — SENNOSIDES 17.2 MG: 8.6 TABLET, FILM COATED ORAL at 20:46

## 2023-12-22 RX ADMIN — ACETAMINOPHEN 975 MG: 325 TABLET ORAL at 14:18

## 2023-12-22 RX ADMIN — GABAPENTIN 300 MG: 300 CAPSULE ORAL at 14:18

## 2023-12-22 RX ADMIN — FINASTERIDE 5 MG: 5 TABLET, FILM COATED ORAL at 09:13

## 2023-12-22 RX ADMIN — DOCUSATE SODIUM 100 MG: 100 CAPSULE, LIQUID FILLED ORAL at 20:46

## 2023-12-22 RX ADMIN — MAGNESIUM OXIDE TAB 400 MG (241.3 MG ELEMENTAL MG) 400 MG: 400 (241.3 MG) TAB at 09:13

## 2023-12-22 RX ADMIN — ACETAMINOPHEN 975 MG: 325 TABLET ORAL at 20:46

## 2023-12-22 RX ADMIN — SENNOSIDES 17.2 MG: 8.6 TABLET, FILM COATED ORAL at 09:13

## 2023-12-22 RX ADMIN — DOCUSATE SODIUM 100 MG: 100 CAPSULE, LIQUID FILLED ORAL at 09:13

## 2023-12-22 RX ADMIN — SERTRALINE HYDROCHLORIDE 100 MG: 100 TABLET ORAL at 09:13

## 2023-12-22 RX ADMIN — FLUTICASONE PROPIONATE 2 SPRAY: 50 SPRAY, METERED NASAL at 14:49

## 2023-12-22 RX ADMIN — ACETAMINOPHEN 975 MG: 325 TABLET ORAL at 04:56

## 2023-12-22 RX ADMIN — LIDOCAINE 1 PATCH: 4 PATCH TOPICAL at 09:14

## 2023-12-22 RX ADMIN — ATORVASTATIN CALCIUM 20 MG: 40 TABLET, FILM COATED ORAL at 09:13

## 2023-12-22 RX ADMIN — DEXAMETHASONE 4 MG: 4 TABLET ORAL at 09:14

## 2023-12-22 RX ADMIN — GABAPENTIN 300 MG: 300 CAPSULE ORAL at 20:46

## 2023-12-22 RX ADMIN — ASPIRIN 81 MG: 81 TABLET, COATED ORAL at 09:13

## 2023-12-22 RX ADMIN — PANTOPRAZOLE SODIUM 40 MG: 40 TABLET, DELAYED RELEASE ORAL at 09:13

## 2023-12-22 ASSESSMENT — PAIN - FUNCTIONAL ASSESSMENT: PAIN_FUNCTIONAL_ASSESSMENT: 0-10

## 2023-12-22 ASSESSMENT — PAIN SCALES - GENERAL: PAINLEVEL_OUTOF10: 0 - NO PAIN

## 2023-12-22 NOTE — PROGRESS NOTES
Spoke with pt regarding discharge today to [facility], pt's spouse whom is also admitted  and is discharged has no skill able needs and pt needs to pay out of pocket for her and [facility] is willing assist him with the medicaid process, pt not provided info to fill out  medicaid application .  Pt is aware that both he and his spouse are discharge and can not stay, precert is obtained to [facility] for him.  Keep asking pt about payment plan for his wife,  [facility] will be able to accept both of them and keep them in same room.  Discussed with pt that he will be leaving today and if not he will receive a letter of non coverage for each.   Dr Haskins also spoke with pt with me and the MALATHI [facility]IN on phone with daughter Vivienne to update thomas on this matter.  Shanta Morris RN TCC

## 2023-12-22 NOTE — PROGRESS NOTES
"Colton Jiménez is a 88 y.o. male on day 4 of admission presenting with Fall, initial encounter.    SUBJECTIVE  Reports overnight patient has been calling 911 in the ER to find out about his missing wallet.  Patient's pain has been improving and his neck and left pubic rami.  He says yesterday's bowel regimen has been helping him slightly however his bowel movements are not fully formed.  His appetite has been good.  OBJECTIVE    Physical Exam:  General: Sitting upright eating breakfast  HEENT: Laceration across left eyebrow noted  Chest:  Clear to auscultation bilaterally  CV:  Regular rate and rhythm.    Extremities:  No lower extremity edema or cyanosis.   Neurological:  AAOx3.   Skin:  Warm and dry.      Last Recorded Vitals  Blood pressure 161/86, pulse 71, temperature 36.5 °C (97.7 °F), temperature source Temporal, resp. rate 18, height 1.753 m (5' 9.02\"), weight 72 kg (158 lb 11.7 oz), SpO2 98 %.  Intake/Output last 3 Shifts:  I/O last 3 completed shifts:  In: 720 (10 mL/kg) [P.O.:720]  Out: 800 (11.1 mL/kg) [Urine:800 (0.3 mL/kg/hr)]  Weight: 72 kg     Last CBC & BMP  Lab Results   Component Value Date    GLUCOSE 95 12/22/2023    CALCIUM 8.9 12/22/2023     12/22/2023    K 4.3 12/22/2023    CO2 31 12/22/2023     12/22/2023    BUN 33 (H) 12/22/2023    CREATININE 0.44 (L) 12/22/2023     Lab Results   Component Value Date    WBC 5.7 12/22/2023    HGB 10.8 (L) 12/22/2023    HCT 31.7 (L) 12/22/2023    MCV 97 12/22/2023     (L) 12/22/2023       ASSESSMENT & PLAN  Colton Jiménez is a 88 y.o. male with past medical history of Hx TIA, Hx DVT, HTN, HLD who presents to the hospital with complaints of fall. Patient states he went out to check his mail and slipped on the driveway. He did strike his head during the fall. He denies LOC. He states he has generally felt well before the fall. His COVID 19 PCR is positive. Looking at the chart looks like he had a symptomatic COVID 19 infection a few weeks ago and " was treated with Paxlovid. Patient himself does not recall this but is clearly documented by his PCP.     Daily progress:  12/21-pending SNF placement, constipated since arrival added senna, discontinued pain medications and continued scheduled Tylenol  12/22-plan for SNF transfer today    #Mechanical Fall  #Pubic Rami Fracture w/ hematoma 2/2 above   #L scalp laceration 2/2 above  -Small region of active bleed and hematoma immediately superior to pubic ramus fracture  -CT head/C-spine shows no brain hemorrhage, fractures  -MRI head and neck shows no acute infarct, hemorrhage or mass noted  -Neck pain likely muscular  Plan  -Scheduled Tylenol, discontinued pain medications  -PT/OT, speech therapy, social service following  -Ortho following, no surgical intervention at this time    #Severe cervical stenosis  -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 centered at disc protrusion and endplate spurring at C4-5 compressing the cord and is severely stenosing with spinal canal  Plan  -Neurosurgery following plan for surgical intervention on 1/9/2024      #COVID 19 + pcr - false positive   -CXR shows no focal lung consolidation or effusion, asymptomatic will monitor      #HTN  #HLD  #Depression/Anxiety   #Hx TIA  #Hx pVT   -Resume home amlodipine, atorvastatin, no satellite, sertraline, aspirin, finasteride        Inpatient Checklist  Code Status: Full  DVT prophylaxis: SCDs, Lovenox  Lines/Drains/Tubes: IV peripheral  Diet: Regular  Disposition ->Destination: Medicine, SNF  ->DC Medications/Needs/Follow-ups: Follow-up with neurology in 4 months, follow-up with orthopedics in 10-14 days, neurosurgery surgical intervention planned for 1/9/2024      Juan J Tejeda, DO  PGY-1, Internal Medicine  Please SecureChat for any further questions  This is a preliminary note, please await attending attestation for final A/P'

## 2023-12-22 NOTE — NURSING NOTE
"Nursing floor receiving phone call from ER stating that patient is calling down to ER inquiring about his wallet. This has been an ongoing issue for several days. No wallet was found in belongings search on admission in ER. Patient has called 911, [facility], [facility] ER, and multiple other random places asking about his wallet.     This RN and bedside RN Yin Chavez went to the room to explain to patient that his wallet never made it to the facility per ER documentation.     Patient stating \"It is none of your damn business\", \"what are you going to do about it, beat me up?\", and \"How about you get the hell out of my room\".     Patient unwilling to listen to reasoning by nurses about prior documentation about his belongings, possibility of it still being at home, ect.     Patient agitated and unwilling. Patient educated to stop calling 911 emergency services for non-emergencies.       "

## 2023-12-22 NOTE — PROGRESS NOTES
"SW and TCC-RN (team) met with pt to discuss discharge orders and discharge to [facility]. Team informed pt that he is medically ready for discharge and that [facility] has accepted pt and can have him at facility today. Team asked if pt was able to discuss funding with [facility] and provide the requested information for Medicaid Pending or private pay. Pt explained that Liliya from [facility] said she would check on pricing for [facility]. SW told pt that [facility] is at capacity for LTC residents at this time but could accept pt skilled. Pt said he wants pt and pt Wife (also Hospitalized) to go together. Team explained that pt and pt Wife can go to [facility] together and be in the same room but pt's Wife will be private pay. Pt discussed his lost wallet. SW discussed pts supportive family members and their ability to help obtain funding without wallet at this time. Pt said that he is not going to bother his family for assistance. Team explained that pt and pt wife are medically discharged and that a letter of non-coverage will be provided if pt does not coordinate discharge plan with [facility] for pt and his wife. Pt said \"then give it to me.\" Team explained to pt that we want pt and his wife to go to [facility] so that he can start his therapies and his wife can continue to receive care. Pt became agitated and asked TCC-RN and SW to leave room.    DOUGLAS YOUNGER    UPDATED NOTE   MALATHI updated  GUERITA Hciks on dc plan and barriers. Discussed letter of non-coverage and possibility of pt's wife going home with family members or pt paying privately for pt's wife to go to facility with pt. TCC-RN spoke with Attending Dr. Jang about dc plan and Dr. Coombs recommended  Speaking with pt with SW and TCC-RN present.  explained that pt is medically ready for dc and that pt can admit to [facility] today.  explained that in order for pts wife to go with pt he will have to pay " privately. Pt voiced understanding of dc order and plan for [facility].    UPDATED NOTE   SW spoke with pt daughter Vivienne via phone to update on dc plan for pt and pt wife. SW explained that we are working on a plan to assist pt with getting funds for pt's Wife to go to LTC Facility [facility]. Vivienne said that pts check book may be at home and that she will go and look for it. SW also discussed the idea of pt's wife going home and pt Daughters (Vivienne and/or Felisha) helping with the care of pt's wife. Vivienne said that she has an injured shoulder and sister Olvin is unable to care for pt's wife at this time. Vivienne explained that she would be at the hospital today to discuss funding with pt. SW will follow up.    DOUGLAS YOUNGER    UPDATED NOTE   MALATHI and TCC-RN confirmed that pt can be transferred to [facility] today. TCC-RN requested transport for pt.    DOUGLAS YOUNGER    UPDATED NOTE 3:30PM  Transport time set for 7PM to [facility]. MALATHI discussed with pt and assigned floor RN.    DOUGLAS YOUNGER

## 2023-12-23 NOTE — NURSING NOTE
Report called to [address] [place] [address]. Spoke to the nurse Carisa. All questions answered. [facility] is for 2145 tonight. Pt is dressed and ready to leave. However, pt did urinate while wearing his current jeans. Many nursing staff members tried to convince pt to allow us to change him into clean pants, but he refuses. Nurse at [address] [place] [address] made aware.

## 2023-12-23 NOTE — NURSING NOTE
[facility] via  at 2200. Pt comfortable and stable upon discharge. All belongings went with pt. Pt remains in c-collar. IV out, tele off.

## 2024-01-09 ENCOUNTER — OFFICE VISIT (OUTPATIENT)
Dept: NEUROSURGERY | Facility: CLINIC | Age: 89
End: 2024-01-09
Payer: MEDICARE

## 2024-01-09 VITALS
SYSTOLIC BLOOD PRESSURE: 126 MMHG | DIASTOLIC BLOOD PRESSURE: 70 MMHG | BODY MASS INDEX: 22.14 KG/M2 | HEART RATE: 97 BPM | RESPIRATION RATE: 16 BRPM | TEMPERATURE: 96.8 F | HEIGHT: 71 IN

## 2024-01-09 DIAGNOSIS — G95.89 MYELOMALACIA OF CERVICAL CORD (MULTI): ICD-10-CM

## 2024-01-09 DIAGNOSIS — M47.12 CERVICAL SPONDYLOSIS WITH MYELOPATHY: Primary | ICD-10-CM

## 2024-01-09 PROBLEM — Z98.1 HISTORY OF LUMBAR SPINAL FUSION: Status: ACTIVE | Noted: 2024-01-09

## 2024-01-09 PROCEDURE — 1159F MED LIST DOCD IN RCRD: CPT | Performed by: NEUROLOGICAL SURGERY

## 2024-01-09 PROCEDURE — 1111F DSCHRG MED/CURRENT MED MERGE: CPT | Performed by: NEUROLOGICAL SURGERY

## 2024-01-09 PROCEDURE — 1036F TOBACCO NON-USER: CPT | Performed by: NEUROLOGICAL SURGERY

## 2024-01-09 PROCEDURE — 99205 OFFICE O/P NEW HI 60 MIN: CPT | Performed by: NEUROLOGICAL SURGERY

## 2024-01-09 PROCEDURE — 1125F AMNT PAIN NOTED PAIN PRSNT: CPT | Performed by: NEUROLOGICAL SURGERY

## 2024-01-09 ASSESSMENT — PATIENT HEALTH QUESTIONNAIRE - PHQ9
1. LITTLE INTEREST OR PLEASURE IN DOING THINGS: NEARLY EVERY DAY
SUM OF ALL RESPONSES TO PHQ9 QUESTIONS 1 AND 2: 5
2. FEELING DOWN, DEPRESSED OR HOPELESS: MORE THAN HALF THE DAYS
10. IF YOU CHECKED OFF ANY PROBLEMS, HOW DIFFICULT HAVE THESE PROBLEMS MADE IT FOR YOU TO DO YOUR WORK, TAKE CARE OF THINGS AT HOME, OR GET ALONG WITH OTHER PEOPLE: SOMEWHAT DIFFICULT

## 2024-01-09 ASSESSMENT — PAIN SCALES - GENERAL: PAINLEVEL: 6

## 2024-01-09 NOTE — PROGRESS NOTES
[facility] [place]  Department of Neurological Surgery  Established Patient Visit    History of Present Illness  Colton Jiménez is a 88 y.o. year old male who presents to the spine clinic in follow up with significant cord compression at C4-5 with myelomalacia from progressive falls.  I saw him in December in the hospital at [place].  Since that time he has significantly improved with regard to his arm strength and he is able to walk with a walker.  He is still in the skilled nursing facility.  I think that based on his exam today and our discussion in the hospital once we have medical clearance we should proceed with an anterior cervical disc excision with anterior interbody fusion and plate fixation.  The discectomy and fusion would take place at C4-5 the plate fixation with screws should span from C4 to C5-C6 since he already has a spontaneous fusion at C5-6.  I will have to be doing some extensive drilling behind the body of C5.  I went over the operation with him in detail. We discussed risks,(these include but are not limited to - bleeding, infection, paralysis, muscle weakness, CSF leak, bowel or bladder dysfunction, incomplete resolution of pain or numbness, DVT/PE, heart attack, stroke, and other unforeseen medical and anesthesia complications) benefits, and outcome possibilities as well as the alternatives to this form of therapy. He understands and would like to proceed.  My concern is that we need clearance from his nurse practitioner and team at [facility].  I would like to of seeing a family member with him today.  He is going to use this note and we will fax it over to his primary doctor and they can consider if there is anything further that is required in his workup.    Patient's BMI is Body mass index is 22.14 kg/m².    14/14 systems reviewed and negative other than what is listed in the history of present illness    Patient Active Problem List   Diagnosis    Fall, initial encounter     Myelomalacia of cervical cord (CMS/HCC)    Cervical spondylosis with myelopathy    History of lumbar spinal fusion     Past Medical History:   Diagnosis Date    Personal history of malignant neoplasm, unspecified     History of malignant neoplasm    Personal history of other diseases of the circulatory system     History of hypertension    Personal history of other diseases of the digestive system     History of diverticulitis    Personal history of other diseases of the digestive system     History of pancreatitis    Personal history of other diseases of the musculoskeletal system and connective tissue     History of arthritis    Personal history of other diseases of the musculoskeletal system and connective tissue     History of back pain    Personal history of other specified conditions     History of abdominal pain    Personal history of other specified conditions     History of headache     Past Surgical History:   Procedure Laterality Date    CATARACT EXTRACTION  09/11/2017    Cataract Surgery    CHOLECYSTECTOMY  09/11/2017    Cholecystectomy    COLONOSCOPY  09/11/2017    Colonoscopy (Fiberoptic)    LUMBAR FUSION      MR HEAD ANGIO WO IV CONTRAST  12/19/2023    MR HEAD ANGIO WO IV CONTRAST 12/19/2023 [facility]    MR NECK ANGIO WO IV CONTRAST  12/19/2023    MR NECK ANGIO WO IV CONTRAST 12/19/2023 [facility]    OTHER SURGICAL HISTORY  09/11/2017    Biopsy Skin     Social History     Tobacco Use    Smoking status: Never     Passive exposure: Never    Smokeless tobacco: Never   Substance Use Topics    Alcohol use: Yes     Comment: occasionally     family history is not on file.    Current Outpatient Medications:     amLODIPine (Norvasc) 2.5 mg tablet, Take 1 tablet (2.5 mg) by mouth once daily., Disp: , Rfl:     aspirin 81 mg EC tablet, Take 1 tablet (81 mg) by mouth once daily., Disp: , Rfl:     atorvastatin (Lipitor) 20 mg tablet, Take 1 tablet (20 mg) by mouth once daily., Disp: , Rfl:     biotin 10 mg tablet, Take  1 tablet (10 mg) by mouth once daily., Disp: , Rfl:     finasteride (Proscar) 5 mg tablet, Take 1 tablet (5 mg) by mouth once daily., Disp: , Rfl:     flunisolide (Nasalide) 25 mcg (0.025 %) spray,non-aerosol, Administer 2 sprays into each nostril once daily., Disp: , Rfl:     sertraline (Zoloft) 100 mg tablet, Take 1 tablet (100 mg) by mouth once daily., Disp: , Rfl:   No Known Allergies    Physical Examination:    General: NAD, AOx 3,  no aphasia or dysarthria, normal fund of knowledge  Cranial Nerves II-XII: VFF, PERRL, EOMI, Face Symm, Facial SILT, Palate/Tongue midline and symmetric  Motor: 4+/5 Throughout all extremities in the uppers,  No drift, no dysmetria on finger to nose  Sensation: SILT and PP throughout all extremities  Positive Romero's with 3+ reflexes throughout  Results:  I personally reviewed and interpreted the imaging results which included the MRI of the cervical spine and the CAT scan which showed significant disc osteophyte complex at C4-5 with a spontaneous fusion at C5-6.  He has a large bridging osteophyte fusing the C5-6 on the left side.  This will be an excellent source of additional bone for his fusion.    Assessment and Plan:    Colton Jiménez is a 88 y.o. year old male who presents to the spine clinic in follow up with significant cord compression at C4-5 with myelomalacia from progressive falls.  I saw him in December in the hospital at [place].  Since that time he has significantly improved with regard to his arm strength and he is able to walk with a walker.  He is still in the skilled nursing facility.  I think that based on his exam today and our discussion in the hospital once we have medical clearance we should proceed with an anterior cervical disc excision with anterior interbody fusion and plate fixation.  The discectomy and fusion would take place at C4-5 the plate fixation with screws should span from C4 to C5-C6 since he already has a spontaneous fusion at C5-6.  I will  have to be doing some extensive drilling behind the body of C5.  I went over the operation with him in detail. We discussed risks,(these include but are not limited to - bleeding, infection, paralysis, muscle weakness, CSF leak, bowel or bladder dysfunction, incomplete resolution of pain or numbness, DVT/PE, heart attack, stroke, and other unforeseen medical and anesthesia complications) benefits, and outcome possibilities as well as the alternatives to this form of therapy. He understands and would like to proceed.  My concern is that we need clearance from his nurse practitioner and team at [facility].  I would like to of seeing a family member with him today.  He is going to use this note and we will fax it over to his primary doctor and they can consider if there is anything further that is required in his workup.    I have reviewed all prior documentation and reviewed the electronic medical record since admission. I have personally have reviewed all advanced imaging not just the reports and used my interpretation as documented as the relevant findings. I have reviewed the risks and benefits of all treatment recommendations listed in this note with the patient and family. I spent a total of 70 minutes in service to this patient's care during this date of service.      The above clinical summary has been dictated with voice recognition software. It has not been proofread for grammatical errors, typographical mistakes, or other semantic inconsistencies.    Thank you for visiting our office today. It was our pleasure to take part in your healthcare.     Do not hesitate to call with any questions regarding your plan of care after leaving. My office can be reached at [phone] M-F 8am-4pm.     To clinicians, thank you very much for this kind referral. It is a privilege to partner with you in the care of your patients. My office would be delighted to assist you with any further consultations or with questions  regarding the plan of care outlined. Do not hesitate to call the office or contact me directly.     Sincerely,    Juanito Moyer MD, FAANS, FACS  Board Certified Neurological Surgeon  , Department of Neurological Surgery  [employer]    [facility]  [address]  [address] [facility] [address]  [place] [id number]    [facility]  [address]  [address] [place] [id number]    Phone: [phone]  Fax: [phone]

## 2024-01-23 ENCOUNTER — HOSPITAL ENCOUNTER (OUTPATIENT)
Dept: CARDIOLOGY | Facility: HOSPITAL | Age: 89
Discharge: HOME | End: 2024-01-23
Payer: MEDICARE

## 2024-01-23 PROCEDURE — 93005 ELECTROCARDIOGRAM TRACING: CPT

## 2024-01-25 LAB
ATRIAL RATE: 58 BPM
P AXIS: 64 DEGREES
PR INTERVAL: 172 MS
Q ONSET: 249 MS
QRS COUNT: 9 BEATS
QRS DURATION: 101 MS
QT INTERVAL: 426 MS
QTC CALCULATION(BAZETT): 419 MS
QTC FREDERICIA: 421 MS
R AXIS: 18 DEGREES
T AXIS: 68 DEGREES
T OFFSET: 462 MS
VENTRICULAR RATE: 58 BPM

## 2024-04-11 ENCOUNTER — HOSPITAL ENCOUNTER (OUTPATIENT)
Dept: RADIOLOGY | Facility: HOSPITAL | Age: 89
Discharge: HOME | End: 2024-04-11
Payer: MEDICARE

## 2024-04-11 DIAGNOSIS — R31.0 GROSS HEMATURIA: ICD-10-CM

## 2024-04-11 PROCEDURE — 76377 3D RENDER W/INTRP POSTPROCES: CPT | Performed by: RADIOLOGY

## 2024-04-11 PROCEDURE — 76377 3D RENDER W/INTRP POSTPROCES: CPT

## 2024-04-11 PROCEDURE — 74178 CT ABD&PLV WO CNTR FLWD CNTR: CPT | Performed by: RADIOLOGY

## 2024-04-11 PROCEDURE — 2550000001 HC RX 255 CONTRASTS

## 2024-04-11 RX ADMIN — IOHEXOL 100 ML: 350 INJECTION, SOLUTION INTRAVENOUS at 16:40

## 2024-11-25 ENCOUNTER — TELEPHONE (OUTPATIENT)
Dept: PAIN MEDICINE | Facility: CLINIC | Age: 89
End: 2024-11-25
Payer: MEDICARE

## 2024-11-25 NOTE — TELEPHONE ENCOUNTER
Advised to go to the emergency room for extreme pain, and it does not the reason that he is calling and he is calling to schedule an appointment to please indicate that in the message when he calls so that one of our schedulers can call him and schedule an appointment.

## 2024-12-10 ENCOUNTER — OFFICE VISIT (OUTPATIENT)
Dept: PAIN MEDICINE | Facility: CLINIC | Age: 89
End: 2024-12-10
Payer: MEDICARE

## 2024-12-10 ENCOUNTER — LAB (OUTPATIENT)
Dept: LAB | Facility: LAB | Age: 89
End: 2024-12-10
Payer: MEDICARE

## 2024-12-10 VITALS
HEART RATE: 80 BPM | TEMPERATURE: 97.9 F | OXYGEN SATURATION: 98 % | WEIGHT: 144 LBS | SYSTOLIC BLOOD PRESSURE: 149 MMHG | HEIGHT: 71 IN | DIASTOLIC BLOOD PRESSURE: 90 MMHG | BODY MASS INDEX: 20.16 KG/M2 | RESPIRATION RATE: 16 BRPM

## 2024-12-10 DIAGNOSIS — M54.16 LUMBAR RADICULOPATHY: Primary | ICD-10-CM

## 2024-12-10 DIAGNOSIS — M54.16 LUMBAR RADICULOPATHY: ICD-10-CM

## 2024-12-10 LAB
ERYTHROCYTE [DISTWIDTH] IN BLOOD BY AUTOMATED COUNT: 13.1 % (ref 11.5–14.5)
HCT VFR BLD AUTO: 37.7 % (ref 41–52)
HGB BLD-MCNC: 12.5 G/DL (ref 13.5–17.5)
MCH RBC QN AUTO: 32.4 PG (ref 26–34)
MCHC RBC AUTO-ENTMCNC: 33.2 G/DL (ref 32–36)
MCV RBC AUTO: 98 FL (ref 80–100)
NRBC BLD-RTO: 0 /100 WBCS (ref 0–0)
PLATELET # BLD AUTO: 146 X10*3/UL (ref 150–450)
RBC # BLD AUTO: 3.86 X10*6/UL (ref 4.5–5.9)
WBC # BLD AUTO: 3.7 X10*3/UL (ref 4.4–11.3)

## 2024-12-10 PROCEDURE — 1036F TOBACCO NON-USER: CPT | Performed by: ANESTHESIOLOGY

## 2024-12-10 PROCEDURE — 1159F MED LIST DOCD IN RCRD: CPT | Performed by: ANESTHESIOLOGY

## 2024-12-10 PROCEDURE — 1125F AMNT PAIN NOTED PAIN PRSNT: CPT | Performed by: ANESTHESIOLOGY

## 2024-12-10 PROCEDURE — 1160F RVW MEDS BY RX/DR IN RCRD: CPT | Performed by: ANESTHESIOLOGY

## 2024-12-10 PROCEDURE — 99204 OFFICE O/P NEW MOD 45 MIN: CPT | Performed by: ANESTHESIOLOGY

## 2024-12-10 PROCEDURE — 85027 COMPLETE CBC AUTOMATED: CPT

## 2024-12-10 PROCEDURE — 99214 OFFICE O/P EST MOD 30 MIN: CPT | Performed by: ANESTHESIOLOGY

## 2024-12-10 PROCEDURE — 36415 COLL VENOUS BLD VENIPUNCTURE: CPT

## 2024-12-10 ASSESSMENT — PAIN SCALES - GENERAL
PAINLEVEL_OUTOF10: 5
PAINLEVEL_OUTOF10: 5 - MODERATE PAIN

## 2024-12-10 ASSESSMENT — ENCOUNTER SYMPTOMS
BLOOD IN STOOL: 0
ADENOPATHY: 0
DEPRESSION: 1
SHORTNESS OF BREATH: 0
EYE PAIN: 0
FEVER: 0
LOSS OF SENSATION IN FEET: 1
BACK PAIN: 1
WEAKNESS: 0
FREQUENCY: 1
OCCASIONAL FEELINGS OF UNSTEADINESS: 1

## 2024-12-10 ASSESSMENT — PAIN DESCRIPTION - DESCRIPTORS: DESCRIPTORS: THROBBING;ACHING;BURNING

## 2024-12-10 ASSESSMENT — PAIN - FUNCTIONAL ASSESSMENT: PAIN_FUNCTIONAL_ASSESSMENT: 0-10

## 2024-12-10 NOTE — H&P (VIEW-ONLY)
Chief Complain    New Patient Visit (Right low back down hip to buttock. No recent imaging.)    History Of Present Illness  Colton Jiménez is a 89 y.o. male here for evaluation of right sided low back pain, radiating to right buttock and lateral hip pain. The patient has been experiencing these symptoms for last 1.5 month(s). The patient describes the pain as throbbing, biting. The patient's current pain score is 5 on a scale from 0-10. The pain is worsened by bending forward and stooping, get in and out of the cars  and is alleviated by nothing relieves the pain. Since the start of the symptoms the pain has been unchanged.    The patient denies any fever, chills,  weakness, numbness, bladder/ bowel incontinence, history of cancer, history of IV drug abuse, recent trauma.      Past Medical History  He has a past medical history of Personal history of malignant neoplasm, unspecified, Personal history of other diseases of the circulatory system, Personal history of other diseases of the digestive system, Personal history of other diseases of the digestive system, Personal history of other diseases of the musculoskeletal system and connective tissue, Personal history of other diseases of the musculoskeletal system and connective tissue, Personal history of other specified conditions, and Personal history of other specified conditions.    Surgical History  He has a past surgical history that includes Other surgical history (09/11/2017); Colonoscopy (09/11/2017); Cholecystectomy (09/11/2017); Cataract extraction (09/11/2017); MR angio neck wo IV contrast (12/19/2023); MR angio head wo IV contrast (12/19/2023); and Lumbar fusion.    Social History  He reports that he has never smoked. He has never been exposed to tobacco smoke. He has never used smokeless tobacco. He reports current alcohol use. He reports that he does not use drugs.    Family History  Non contributory     Allergies  Patient has no known allergies.    Review  "of Systems  Review of Systems   Constitutional:  Negative for fever.   HENT:  Negative for ear pain.    Eyes:  Negative for pain.   Respiratory:  Negative for shortness of breath.    Cardiovascular:  Negative for chest pain.   Gastrointestinal:  Negative for blood in stool.   Endocrine: Negative for cold intolerance.   Genitourinary:  Positive for frequency and urgency.   Musculoskeletal:  Positive for back pain.   Skin:  Negative for rash.   Allergic/Immunologic: Negative for food allergies.   Neurological:  Negative for weakness.   Hematological:  Negative for adenopathy.   Psychiatric/Behavioral:  Negative for suicidal ideas.         Physical Exam  Physical Exam  Constitutional:       Appearance: Normal appearance.   HENT:      Head: Normocephalic and atraumatic.   Eyes:      Extraocular Movements: Extraocular movements intact.      Pupils: Pupils are equal, round, and reactive to light.   Cardiovascular:      Rate and Rhythm: Normal rate.   Pulmonary:      Effort: Pulmonary effort is normal.   Abdominal:      Palpations: Abdomen is soft.   Musculoskeletal:      Cervical back: Neck supple.   Skin:     General: Skin is warm.   Neurological:      Mental Status: He is alert and oriented to person, place, and time.      Coordination: Coordination abnormal.      Deep Tendon Reflexes:      Reflex Scores:       Patellar reflexes are 3+ on the right side and 3+ on the left side.       Achilles reflexes are 1+ on the right side and 1+ on the left side.  Psychiatric:         Mood and Affect: Mood normal.         Behavior: Behavior normal.           Last Recorded Vitals  Blood pressure 149/90, pulse 80, temperature 36.6 °C (97.9 °F), resp. rate 16, height 1.803 m (5' 11\"), weight 65.3 kg (144 lb), SpO2 98%.    Reviewed Images  Reviewed and independently interpreted CT of lumbar spine revealing L4-5, L5-S1 fusion, listhesis at L3-4 diffuse degenerative changes as well as anterior osteophytosis    Reviewed Labs  BASIC " METABOLIC PANEL  Order: [id number]  Component  Ref Range & Units 3 wk ago   Glucose  74 - 99 mg/dL 96   Comment: The American Diabetes Association (ADA) provides guidance for cutoff values for fasting glucose and random glucose. The ADA defines fasting as no caloric intake for at least 8 hours. Fasting plasma glucose results between 100 to 125 mg/dL indicate increased risk for diabetes (prediabetes).  Fasting plasma glucose results greater than or equal to 126 mg/dL meet the criteria for diagnosis of diabetes. In the absence of unequivocal hyperglycemia, results should be confirmed by repeat testing. In a patient with classic symptoms of hyperglycemia or hyperglycemic crisis, random plasma glucose results greater than or equal to 200 mg/dL meet the criteria for diagnosis of diabetes.  Reference: Standards of Medical Care in Diabetes 2016, American Diabetes Association. Diabetes Care. 2016.39(Suppl 1).   BUN  9 - 24 mg/dL 22   Creatinine  0.73 - 1.22 mg/dL 0.64 Low    Sodium  136 - 144 mmol/L 140   Potassium  3.7 - 5.1 mmol/L 4.2   Chloride  98 - 107 mmol/L 106   CO2  22 - 30 mmol/L 29   Anion Gap  8 - 15 mmol/L 5 Low    Calcium, Total  8.5 - 10.2 mg/dL 8.7   Estimated Glomerular Filtration Rate  >=60 mL/min/1.73m² 92   Comment: Estimated Glomerular Filtration Rate (eGFR) is calculated using the 2021 CKD-EPI creatinine equation. This equation utilizes serum creatinine, sex, and age as parameters. The creatinine assay has traceable calibration to isotope dilution-mass spectrometry. Refer to KDIGO guidelines for clinical interpretation. In patients with unstable renal function, e.g. those with acute kidney injury, the eGFR may not accurately reflect actual GFR.   ntains abnormal data COMPLETE BLOOD COUNT  Order: [id number]  Component  Ref Range & Units 3 wk ago   WBC  3.70 - 11.00 k/uL 2.64 Low    RBC  4.20 - 6.00 m/uL 3.29 Low    Hemoglobin  13.0 - 17.0 g/dL 10.9 Low    Hematocrit  39.0 - 51.0 % 31.7 Low    MCV  80.0 -  100.0 fL 96.4   MCH  26.0 - 34.0 pg 33.1   MCHC  30.5 - 36.0 g/dL 34.4   RDW-CV  11.5 - 15.0 % 13.0   Platelet Count  150 - 400 k/uL 111 Low    Comment: No clot detected.   MPV  9.0 - 12.7 fL 11.4   Absolute nRBC  <0.01 k/uL <0.01   Resulting Agency [facility]       Specimen Collected: 11/14/24 07:52        Assessment/Plan   Encounter Diagnosis   Name Primary?    Lumbar radiculopathy Yes        Colton Jiménez is a 89 y.o. male here for evaluation of right-sided low back pain rating to right buttock lateral hip it was also going down to his right lower extremity to his leg, currently staying mostly to the lateral hip and thigh.  Experiencing the symptoms for a month and a half or so.  He denies any trauma or inciting event.  Past medical history significant of lumbar fusion between L4-S1 done 20 years ago.  He was seen at [facility] ER a month ago for the symptoms where he had a CT scan done which did not reveal any acute injuries or fractures.  He managing his pain with combination of Tylenol and 5% lidocaine patch with limited benefit.  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  This finding may explain his symptoms.  Recommend trial of right S1 transforaminal epidural steroid injection.  She does not respond would consider getting an MRI for further evaluation.  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  Will obtain the blood work to check on his platelet count.       Total time spent caring for the patient today was 45 minutes. This includes time spent before the visit reviewing the chart, time spent during the visit, and time spent after the visit on documentation.      Naman Shanks MD

## 2024-12-10 NOTE — PROGRESS NOTES
Chief Complain    New Patient Visit (Right low back down hip to buttock. No recent imaging.)    History Of Present Illness  Colton Jiménez is a 89 y.o. male here for evaluation of right sided low back pain, radiating to right buttock and lateral hip pain. The patient has been experiencing these symptoms for last 1.5 month(s). The patient describes the pain as throbbing, biting. The patient's current pain score is 5 on a scale from 0-10. The pain is worsened by bending forward and stooping, get in and out of the cars  and is alleviated by nothing relieves the pain. Since the start of the symptoms the pain has been unchanged.    The patient denies any fever, chills,  weakness, numbness, bladder/ bowel incontinence, history of cancer, history of IV drug abuse, recent trauma.      Past Medical History  He has a past medical history of Personal history of malignant neoplasm, unspecified, Personal history of other diseases of the circulatory system, Personal history of other diseases of the digestive system, Personal history of other diseases of the digestive system, Personal history of other diseases of the musculoskeletal system and connective tissue, Personal history of other diseases of the musculoskeletal system and connective tissue, Personal history of other specified conditions, and Personal history of other specified conditions.    Surgical History  He has a past surgical history that includes Other surgical history (09/11/2017); Colonoscopy (09/11/2017); Cholecystectomy (09/11/2017); Cataract extraction (09/11/2017); MR angio neck wo IV contrast (12/19/2023); MR angio head wo IV contrast (12/19/2023); and Lumbar fusion.    Social History  He reports that he has never smoked. He has never been exposed to tobacco smoke. He has never used smokeless tobacco. He reports current alcohol use. He reports that he does not use drugs.    Family History  Non contributory     Allergies  Patient has no known allergies.    Review  "of Systems  Review of Systems   Constitutional:  Negative for fever.   HENT:  Negative for ear pain.    Eyes:  Negative for pain.   Respiratory:  Negative for shortness of breath.    Cardiovascular:  Negative for chest pain.   Gastrointestinal:  Negative for blood in stool.   Endocrine: Negative for cold intolerance.   Genitourinary:  Positive for frequency and urgency.   Musculoskeletal:  Positive for back pain.   Skin:  Negative for rash.   Allergic/Immunologic: Negative for food allergies.   Neurological:  Negative for weakness.   Hematological:  Negative for adenopathy.   Psychiatric/Behavioral:  Negative for suicidal ideas.         Physical Exam  Physical Exam  Constitutional:       Appearance: Normal appearance.   HENT:      Head: Normocephalic and atraumatic.   Eyes:      Extraocular Movements: Extraocular movements intact.      Pupils: Pupils are equal, round, and reactive to light.   Cardiovascular:      Rate and Rhythm: Normal rate.   Pulmonary:      Effort: Pulmonary effort is normal.   Abdominal:      Palpations: Abdomen is soft.   Musculoskeletal:      Cervical back: Neck supple.   Skin:     General: Skin is warm.   Neurological:      Mental Status: He is alert and oriented to person, place, and time.      Coordination: Coordination abnormal.      Deep Tendon Reflexes:      Reflex Scores:       Patellar reflexes are 3+ on the right side and 3+ on the left side.       Achilles reflexes are 1+ on the right side and 1+ on the left side.  Psychiatric:         Mood and Affect: Mood normal.         Behavior: Behavior normal.           Last Recorded Vitals  Blood pressure 149/90, pulse 80, temperature 36.6 °C (97.9 °F), resp. rate 16, height 1.803 m (5' 11\"), weight 65.3 kg (144 lb), SpO2 98%.    Reviewed Images  Reviewed and independently interpreted CT of lumbar spine revealing L4-5, L5-S1 fusion, listhesis at L3-4 diffuse degenerative changes as well as anterior osteophytosis    Reviewed Labs  BASIC " METABOLIC PANEL  Order: [id number]  Component  Ref Range & Units 3 wk ago   Glucose  74 - 99 mg/dL 96   Comment: The American Diabetes Association (ADA) provides guidance for cutoff values for fasting glucose and random glucose. The ADA defines fasting as no caloric intake for at least 8 hours. Fasting plasma glucose results between 100 to 125 mg/dL indicate increased risk for diabetes (prediabetes).  Fasting plasma glucose results greater than or equal to 126 mg/dL meet the criteria for diagnosis of diabetes. In the absence of unequivocal hyperglycemia, results should be confirmed by repeat testing. In a patient with classic symptoms of hyperglycemia or hyperglycemic crisis, random plasma glucose results greater than or equal to 200 mg/dL meet the criteria for diagnosis of diabetes.  Reference: Standards of Medical Care in Diabetes 2016, American Diabetes Association. Diabetes Care. 2016.39(Suppl 1).   BUN  9 - 24 mg/dL 22   Creatinine  0.73 - 1.22 mg/dL 0.64 Low    Sodium  136 - 144 mmol/L 140   Potassium  3.7 - 5.1 mmol/L 4.2   Chloride  98 - 107 mmol/L 106   CO2  22 - 30 mmol/L 29   Anion Gap  8 - 15 mmol/L 5 Low    Calcium, Total  8.5 - 10.2 mg/dL 8.7   Estimated Glomerular Filtration Rate  >=60 mL/min/1.73m² 92   Comment: Estimated Glomerular Filtration Rate (eGFR) is calculated using the 2021 CKD-EPI creatinine equation. This equation utilizes serum creatinine, sex, and age as parameters. The creatinine assay has traceable calibration to isotope dilution-mass spectrometry. Refer to KDIGO guidelines for clinical interpretation. In patients with unstable renal function, e.g. those with acute kidney injury, the eGFR may not accurately reflect actual GFR.   ntains abnormal data COMPLETE BLOOD COUNT  Order: [id number]  Component  Ref Range & Units 3 wk ago   WBC  3.70 - 11.00 k/uL 2.64 Low    RBC  4.20 - 6.00 m/uL 3.29 Low    Hemoglobin  13.0 - 17.0 g/dL 10.9 Low    Hematocrit  39.0 - 51.0 % 31.7 Low    MCV  80.0 -  100.0 fL 96.4   MCH  26.0 - 34.0 pg 33.1   MCHC  30.5 - 36.0 g/dL 34.4   RDW-CV  11.5 - 15.0 % 13.0   Platelet Count  150 - 400 k/uL 111 Low    Comment: No clot detected.   MPV  9.0 - 12.7 fL 11.4   Absolute nRBC  <0.01 k/uL <0.01   Resulting Agency [facility]       Specimen Collected: 11/14/24 07:52        Assessment/Plan   Encounter Diagnosis   Name Primary?    Lumbar radiculopathy Yes        Colton Jiménez is a 89 y.o. male here for evaluation of right-sided low back pain rating to right buttock lateral hip it was also going down to his right lower extremity to his leg, currently staying mostly to the lateral hip and thigh.  Experiencing the symptoms for a month and a half or so.  He denies any trauma or inciting event.  Past medical history significant of lumbar fusion between L4-S1 done 20 years ago.  He was seen at [facility] ER a month ago for the symptoms where he had a CT scan done which did not reveal any acute injuries or fractures.  He managing his pain with combination of Tylenol and 5% lidocaine patch with limited benefit.  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  This finding may explain his symptoms.  Recommend trial of right S1 transforaminal epidural steroid injection.  She does not respond would consider getting an MRI for further evaluation.  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  Will obtain the blood work to check on his platelet count.       Total time spent caring for the patient today was 45 minutes. This includes time spent before the visit reviewing the chart, time spent during the visit, and time spent after the visit on documentation.      Naman Shanks MD

## 2024-12-13 ENCOUNTER — HOSPITAL ENCOUNTER (OUTPATIENT)
Dept: PAIN MEDICINE | Facility: CLINIC | Age: 89
Discharge: HOME | End: 2024-12-13
Payer: MEDICARE

## 2024-12-13 VITALS
TEMPERATURE: 98.4 F | BODY MASS INDEX: 20.08 KG/M2 | OXYGEN SATURATION: 99 % | DIASTOLIC BLOOD PRESSURE: 75 MMHG | RESPIRATION RATE: 18 BRPM | HEART RATE: 100 BPM | SYSTOLIC BLOOD PRESSURE: 149 MMHG | WEIGHT: 144 LBS

## 2024-12-13 DIAGNOSIS — M54.16 LUMBAR RADICULOPATHY: ICD-10-CM

## 2024-12-13 PROCEDURE — 2550000001 HC RX 255 CONTRASTS: Performed by: ANESTHESIOLOGY

## 2024-12-13 PROCEDURE — 64483 NJX AA&/STRD TFRM EPI L/S 1: CPT | Performed by: ANESTHESIOLOGY

## 2024-12-13 PROCEDURE — 2500000004 HC RX 250 GENERAL PHARMACY W/ HCPCS (ALT 636 FOR OP/ED): Performed by: ANESTHESIOLOGY

## 2024-12-13 RX ORDER — LIDOCAINE HYDROCHLORIDE 10 MG/ML
INJECTION, SOLUTION EPIDURAL; INFILTRATION; INTRACAUDAL; PERINEURAL AS NEEDED
Status: COMPLETED | OUTPATIENT
Start: 2024-12-13 | End: 2024-12-13

## 2024-12-13 RX ORDER — DEXAMETHASONE SODIUM PHOSPHATE 10 MG/ML
INJECTION INTRAMUSCULAR; INTRAVENOUS AS NEEDED
Status: COMPLETED | OUTPATIENT
Start: 2024-12-13 | End: 2024-12-13

## 2024-12-13 ASSESSMENT — ENCOUNTER SYMPTOMS
DEPRESSION: 0
OCCASIONAL FEELINGS OF UNSTEADINESS: 1
LOSS OF SENSATION IN FEET: 1

## 2024-12-13 ASSESSMENT — PAIN - FUNCTIONAL ASSESSMENT
PAIN_FUNCTIONAL_ASSESSMENT: 0-10
PAIN_FUNCTIONAL_ASSESSMENT: 0-10

## 2024-12-13 ASSESSMENT — PAIN SCALES - GENERAL
PAINLEVEL_OUTOF10: 5 - MODERATE PAIN
PAINLEVEL_OUTOF10: 4

## 2024-12-13 NOTE — Clinical Note
Patient tolerated the procedure well and is comfortable with no complaints of pain. Vital signs stable. Arousable prior to transport. Patient transported to PACU via wheel chair. Handoff completed.

## 2025-01-20 ENCOUNTER — APPOINTMENT (OUTPATIENT)
Dept: PAIN MEDICINE | Facility: CLINIC | Age: 89
End: 2025-01-20
Payer: MEDICARE

## 2025-01-27 ENCOUNTER — OFFICE VISIT (OUTPATIENT)
Dept: PAIN MEDICINE | Facility: CLINIC | Age: 89
End: 2025-01-27
Payer: MEDICARE

## 2025-01-27 VITALS
SYSTOLIC BLOOD PRESSURE: 133 MMHG | BODY MASS INDEX: 19.88 KG/M2 | HEART RATE: 84 BPM | OXYGEN SATURATION: 98 % | WEIGHT: 142 LBS | RESPIRATION RATE: 18 BRPM | DIASTOLIC BLOOD PRESSURE: 79 MMHG | HEIGHT: 71 IN

## 2025-01-27 DIAGNOSIS — M54.16 LUMBAR RADICULOPATHY: Primary | ICD-10-CM

## 2025-01-27 PROCEDURE — 99213 OFFICE O/P EST LOW 20 MIN: CPT | Performed by: ANESTHESIOLOGY

## 2025-01-27 PROCEDURE — 1159F MED LIST DOCD IN RCRD: CPT | Performed by: ANESTHESIOLOGY

## 2025-01-27 PROCEDURE — 1160F RVW MEDS BY RX/DR IN RCRD: CPT | Performed by: ANESTHESIOLOGY

## 2025-01-27 PROCEDURE — 1125F AMNT PAIN NOTED PAIN PRSNT: CPT | Performed by: ANESTHESIOLOGY

## 2025-01-27 PROCEDURE — 1036F TOBACCO NON-USER: CPT | Performed by: ANESTHESIOLOGY

## 2025-01-27 SDOH — ECONOMIC STABILITY: FOOD INSECURITY: WITHIN THE PAST 12 MONTHS, THE FOOD YOU BOUGHT JUST DIDN'T LAST AND YOU DIDN'T HAVE MONEY TO GET MORE.: NEVER TRUE

## 2025-01-27 SDOH — ECONOMIC STABILITY: FOOD INSECURITY: WITHIN THE PAST 12 MONTHS, YOU WORRIED THAT YOUR FOOD WOULD RUN OUT BEFORE YOU GOT MONEY TO BUY MORE.: NEVER TRUE

## 2025-01-27 ASSESSMENT — ENCOUNTER SYMPTOMS
BLOOD IN STOOL: 0
SHORTNESS OF BREATH: 0
LOSS OF SENSATION IN FEET: 1
OCCASIONAL FEELINGS OF UNSTEADINESS: 1
BACK PAIN: 1
DEPRESSION: 0

## 2025-01-27 ASSESSMENT — PATIENT HEALTH QUESTIONNAIRE - PHQ9
1. LITTLE INTEREST OR PLEASURE IN DOING THINGS: NOT AT ALL
2. FEELING DOWN, DEPRESSED OR HOPELESS: NOT AT ALL
SUM OF ALL RESPONSES TO PHQ9 QUESTIONS 1 & 2: 0

## 2025-01-27 ASSESSMENT — PAIN - FUNCTIONAL ASSESSMENT: PAIN_FUNCTIONAL_ASSESSMENT: 0-10

## 2025-01-27 ASSESSMENT — PAIN DESCRIPTION - DESCRIPTORS: DESCRIPTORS: SHARP

## 2025-01-27 ASSESSMENT — LIFESTYLE VARIABLES
SKIP TO QUESTIONS 9-10: 1
HOW OFTEN DO YOU HAVE SIX OR MORE DRINKS ON ONE OCCASION: NEVER
HOW OFTEN DO YOU HAVE A DRINK CONTAINING ALCOHOL: 4 OR MORE TIMES A WEEK
HOW MANY STANDARD DRINKS CONTAINING ALCOHOL DO YOU HAVE ON A TYPICAL DAY: 1 OR 2
AUDIT-C TOTAL SCORE: 4

## 2025-01-27 ASSESSMENT — COLUMBIA-SUICIDE SEVERITY RATING SCALE - C-SSRS
1. IN THE PAST MONTH, HAVE YOU WISHED YOU WERE DEAD OR WISHED YOU COULD GO TO SLEEP AND NOT WAKE UP?: NO
2. HAVE YOU ACTUALLY HAD ANY THOUGHTS OF KILLING YOURSELF?: NO
6. HAVE YOU EVER DONE ANYTHING, STARTED TO DO ANYTHING, OR PREPARED TO DO ANYTHING TO END YOUR LIFE?: NO

## 2025-01-27 ASSESSMENT — PAIN SCALES - GENERAL
PAINLEVEL_OUTOF10: 3
PAINLEVEL_OUTOF10: 3

## 2025-01-27 NOTE — PROGRESS NOTES
Pt is a follow up visit and is complaining of pain in his right low back that radiates down his buttock and hip. Pt denies radiation of pain to leg. Pt states his pain level is a 3/10 on the pain scale. Pt had a lumbar transforaminal 12/13/24 and states that he received 50% pain relief for 6 weeks. Pt has a history of back surgery.

## 2025-01-27 NOTE — PROGRESS NOTES
Chief Complain    Back Pain and Follow-up (Right buttock and hip )    History Of Present Illness  Colton Jiménez is a 89 y.o. male here for follow-up of right sided low back pain, radiating to right buttock and lateral hip pain. The patient has been experiencing these symptoms for last few  month(s). The patient describes the pain as throbbing, biting. The patient's current pain score is 3 on a scale from 0-10. The pain is worsened by bending forward and stooping, get in and out of the cars  and is alleviated by nothing relieves the pain. Since the last visit the pain has improved.    Previous procedures:  Right S1 transforaminal on 12/13/2024: 50% relief of pain    Past Medical History  He has a past medical history of Personal history of malignant neoplasm, unspecified, Personal history of other diseases of the circulatory system, Personal history of other diseases of the digestive system, Personal history of other diseases of the digestive system, Personal history of other diseases of the musculoskeletal system and connective tissue, Personal history of other diseases of the musculoskeletal system and connective tissue, Personal history of other specified conditions, and Personal history of other specified conditions.    Surgical History  He has a past surgical history that includes Other surgical history (09/11/2017); Colonoscopy (09/11/2017); Cholecystectomy (09/11/2017); Cataract extraction (09/11/2017); MR angio neck wo IV contrast (12/19/2023); MR angio head wo IV contrast (12/19/2023); and Lumbar fusion.    Social History  He reports that he has never smoked. He has never been exposed to tobacco smoke. He has never used smokeless tobacco. He reports current alcohol use. He reports that he does not use drugs.    Family History  Non contributory     Allergies  Patient has no known allergies.    Review of Systems  Review of Systems   Respiratory:  Negative for shortness of breath.    Cardiovascular:  Negative for  "chest pain.   Gastrointestinal:  Negative for blood in stool.   Musculoskeletal:  Positive for back pain.   Psychiatric/Behavioral:  Negative for suicidal ideas.         Physical Exam  Physical Exam  Constitutional:       Appearance: Normal appearance.   HENT:      Head: Normocephalic and atraumatic.   Eyes:      Pupils: Pupils are equal, round, and reactive to light.   Pulmonary:      Effort: Pulmonary effort is normal.   Neurological:      Mental Status: He is alert and oriented to person, place, and time.   Psychiatric:         Behavior: Behavior normal.         Last Recorded Vitals  Blood pressure 133/79, pulse 84, resp. rate 18, height 1.803 m (5' 11\"), weight 64.4 kg (142 lb), SpO2 98%.    Reviewed Images  Reviewed and independently interpreted CT of lumbar spine revealing L4-5, L5-S1 fusion, listhesis at L3-4 diffuse degenerative changes as well as anterior osteophytosis    Reviewed Labs  BASIC METABOLIC PANEL  Order: [id number]  Component  Ref Range & Units 3 wk ago   Glucose  74 - 99 mg/dL 96   Comment: The American Diabetes Association (ADA) provides guidance for cutoff values for fasting glucose and random glucose. The ADA defines fasting as no caloric intake for at least 8 hours. Fasting plasma glucose results between 100 to 125 mg/dL indicate increased risk for diabetes (prediabetes).  Fasting plasma glucose results greater than or equal to 126 mg/dL meet the criteria for diagnosis of diabetes. In the absence of unequivocal hyperglycemia, results should be confirmed by repeat testing. In a patient with classic symptoms of hyperglycemia or hyperglycemic crisis, random plasma glucose results greater than or equal to 200 mg/dL meet the criteria for diagnosis of diabetes.  Reference: Standards of Medical Care in Diabetes 2016, American Diabetes Association. Diabetes Care. 2016.39(Suppl 1).   BUN  9 - 24 mg/dL 22   Creatinine  0.73 - 1.22 mg/dL 0.64 Low    Sodium  136 - 144 mmol/L 140   Potassium  3.7 - 5.1 " mmol/L 4.2   Chloride  98 - 107 mmol/L 106   CO2  22 - 30 mmol/L 29   Anion Gap  8 - 15 mmol/L 5 Low    Calcium, Total  8.5 - 10.2 mg/dL 8.7   Estimated Glomerular Filtration Rate  >=60 mL/min/1.73m² 92   Comment: Estimated Glomerular Filtration Rate (eGFR) is calculated using the 2021 CKD-EPI creatinine equation. This equation utilizes serum creatinine, sex, and age as parameters. The creatinine assay has traceable calibration to isotope dilution-mass spectrometry. Refer to KDIGO guidelines for clinical interpretation. In patients with unstable renal function, e.g. those with acute kidney injury, the eGFR may not accurately reflect actual GFR.   ntains abnormal data COMPLETE BLOOD COUNT  Order: [id number]  Component  Ref Range & Units 3 wk ago   WBC  3.70 - 11.00 k/uL 2.64 Low    RBC  4.20 - 6.00 m/uL 3.29 Low    Hemoglobin  13.0 - 17.0 g/dL 10.9 Low    Hematocrit  39.0 - 51.0 % 31.7 Low    MCV  80.0 - 100.0 fL 96.4   MCH  26.0 - 34.0 pg 33.1   MCHC  30.5 - 36.0 g/dL 34.4   RDW-CV  11.5 - 15.0 % 13.0   Platelet Count  150 - 400 k/uL 111 Low    Comment: No clot detected.   MPV  9.0 - 12.7 fL 11.4   Absolute nRBC  <0.01 k/uL <0.01   Resulting Agency [facility]       Specimen Collected: 11/14/24 07:52        Assessment/Plan   Encounter Diagnosis   Name Primary?    Lumbar radiculopathy Yes          Colton Jiménez is a 89 y.o. male here for evaluation of right-sided low back pain rating to right buttock lateral hip it was also going down to his right lower extremity to his leg, currently staying mostly to the lateral hip and thigh.  Experiencing the symptoms for a month and a half or so.  He denies any trauma or inciting event.  Past medical history significant of lumbar fusion between L4-S1 done 20 years ago.  He was seen at [facility] a month ago for the symptoms where he had a CT scan done which did not reveal any acute injuries or fractures.  He managing his pain with combination of Tylenol and 5% lidocaine  patch with limited benefit.  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      Last visit we did right S1 transforaminal epidural steroid injection he reports around 50% improvement in his pain now the pain has started to return back.  He denies any new neurological, constitutional symptoms.  Given the significant relief he had from the last injection I would recommend repeating injection as needed at least 12 weeks from the last injection.     Total time spent caring for the patient today was 22 minutes. This includes time spent before the visit reviewing the chart, time spent during the visit, and time spent after the visit on documentation.      Naman Shanks MD

## 2025-01-28 ENCOUNTER — APPOINTMENT (OUTPATIENT)
Dept: NEUROSURGERY | Facility: CLINIC | Age: 89
End: 2025-01-28
Payer: MEDICARE

## 2025-03-05 ENCOUNTER — HOSPITAL ENCOUNTER (OUTPATIENT)
Dept: PAIN MEDICINE | Facility: CLINIC | Age: 89
Discharge: HOME | End: 2025-03-05
Payer: MEDICARE

## 2025-03-05 VITALS
HEART RATE: 82 BPM | TEMPERATURE: 98.8 F | WEIGHT: 142 LBS | BODY MASS INDEX: 19.8 KG/M2 | OXYGEN SATURATION: 98 % | SYSTOLIC BLOOD PRESSURE: 149 MMHG | DIASTOLIC BLOOD PRESSURE: 75 MMHG | RESPIRATION RATE: 16 BRPM

## 2025-03-05 DIAGNOSIS — M54.16 LUMBAR RADICULOPATHY: ICD-10-CM

## 2025-03-05 PROCEDURE — 2500000005 HC RX 250 GENERAL PHARMACY W/O HCPCS: Performed by: ANESTHESIOLOGY

## 2025-03-05 PROCEDURE — 2500000004 HC RX 250 GENERAL PHARMACY W/ HCPCS (ALT 636 FOR OP/ED): Performed by: ANESTHESIOLOGY

## 2025-03-05 PROCEDURE — 2550000001 HC RX 255 CONTRASTS: Performed by: ANESTHESIOLOGY

## 2025-03-05 PROCEDURE — 64483 NJX AA&/STRD TFRM EPI L/S 1: CPT | Performed by: ANESTHESIOLOGY

## 2025-03-05 RX ORDER — SODIUM CHLORIDE 9 MG/ML
INJECTION, SOLUTION INTRAMUSCULAR; INTRAVENOUS; SUBCUTANEOUS AS NEEDED
Status: COMPLETED | OUTPATIENT
Start: 2025-03-05 | End: 2025-03-05

## 2025-03-05 RX ORDER — LIDOCAINE HYDROCHLORIDE 10 MG/ML
INJECTION, SOLUTION EPIDURAL; INFILTRATION; INTRACAUDAL; PERINEURAL AS NEEDED
Status: COMPLETED | OUTPATIENT
Start: 2025-03-05 | End: 2025-03-05

## 2025-03-05 RX ORDER — DEXAMETHASONE SODIUM PHOSPHATE 10 MG/ML
INJECTION INTRAMUSCULAR; INTRAVENOUS AS NEEDED
Status: COMPLETED | OUTPATIENT
Start: 2025-03-05 | End: 2025-03-05

## 2025-03-05 RX ADMIN — SODIUM CHLORIDE 10 ML: 9 INJECTION, SOLUTION INTRAMUSCULAR; INTRAVENOUS; SUBCUTANEOUS at 13:44

## 2025-03-05 RX ADMIN — LIDOCAINE HYDROCHLORIDE 10 ML: 10 INJECTION, SOLUTION EPIDURAL; INFILTRATION; INTRACAUDAL; PERINEURAL at 13:43

## 2025-03-05 RX ADMIN — DEXAMETHASONE SODIUM PHOSPHATE 10 MG: 10 INJECTION, SOLUTION INTRAMUSCULAR; INTRAVENOUS at 13:49

## 2025-03-05 RX ADMIN — IOHEXOL 3 ML: 300 INJECTION, SOLUTION INTRAVENOUS at 13:44

## 2025-03-05 ASSESSMENT — PAIN SCALES - GENERAL
PAINLEVEL_OUTOF10: 4
PAINLEVEL_OUTOF10: 0 - NO PAIN

## 2025-03-05 ASSESSMENT — PAIN - FUNCTIONAL ASSESSMENT
PAIN_FUNCTIONAL_ASSESSMENT: 0-10
PAIN_FUNCTIONAL_ASSESSMENT: 0-10

## 2025-03-05 ASSESSMENT — ENCOUNTER SYMPTOMS
BACK PAIN: 1
SHORTNESS OF BREATH: 0

## 2025-03-05 NOTE — NURSING NOTE
1351: Patient out of procedure room, patient denies current pain or distress. Lumbar dressing is D/I no evidence of bleeding.  DR Shanks  wants patient to stay 30 min post procedure to assure no issues with bleeding since he took aspirin this morning and is a self .    1420: No evidence of  bleeding from injection site, patient able to ambulate without difficulty. discharge home self drive at this time.

## 2025-03-05 NOTE — H&P
History Of Present Illness  Colton Jiménez is a 89 y.o. male presenting  for lumbar TFESI     Past Medical History  Past Medical History:   Diagnosis Date    Personal history of malignant neoplasm, unspecified     History of malignant neoplasm    Personal history of other diseases of the circulatory system     History of hypertension    Personal history of other diseases of the digestive system     History of diverticulitis    Personal history of other diseases of the digestive system     History of pancreatitis    Personal history of other diseases of the musculoskeletal system and connective tissue     History of arthritis    Personal history of other diseases of the musculoskeletal system and connective tissue     History of back pain    Personal history of other specified conditions     History of abdominal pain    Personal history of other specified conditions     History of headache       Surgical History  Past Surgical History:   Procedure Laterality Date    CATARACT EXTRACTION  09/11/2017    Cataract Surgery    CHOLECYSTECTOMY  09/11/2017    Cholecystectomy    COLONOSCOPY  09/11/2017    Colonoscopy (Fiberoptic)    LUMBAR FUSION      MR HEAD ANGIO WO IV CONTRAST  12/19/2023    MR HEAD ANGIO WO IV CONTRAST 12/19/2023 [facility]    MR NECK ANGIO WO IV CONTRAST  12/19/2023    MR NECK ANGIO WO IV CONTRAST 12/19/2023 [facility]    OTHER SURGICAL HISTORY  09/11/2017    Biopsy Skin        Social History  He reports that he has never smoked. He has never been exposed to tobacco smoke. He has never used smokeless tobacco. He reports current alcohol use. He reports that he does not use drugs.    Family History  No family history on file.     Allergies  Patient has no known allergies.    Review of Systems   Respiratory:  Negative for shortness of breath.    Cardiovascular:  Negative for chest pain.   Musculoskeletal:  Positive for back pain.   Psychiatric/Behavioral:  Negative for suicidal ideas.         Physical  Exam  Cardiovascular:      Rate and Rhythm: Normal rate.   Pulmonary:      Effort: Pulmonary effort is normal.   Neurological:      Mental Status: He is alert and oriented to person, place, and time.          Last Recorded Vitals  Blood pressure 149/75, pulse 79, temperature 37.1 °C (98.8 °F), resp. rate 18, weight 64.4 kg (142 lb), SpO2 98%.      Assessment/Plan       Patient does not have any contraindications for the planned procedure.      Naman Shanks MD

## 2025-04-21 ENCOUNTER — APPOINTMENT (OUTPATIENT)
Dept: PAIN MEDICINE | Facility: CLINIC | Age: 89
End: 2025-04-21
Payer: MEDICARE

## 2025-05-13 ENCOUNTER — HOSPITAL ENCOUNTER (INPATIENT)
Facility: HOSPITAL | Age: 89
LOS: 4 days | Discharge: HOME HEALTH | DRG: 563 | End: 2025-05-18
Attending: EMERGENCY MEDICINE | Admitting: INTERNAL MEDICINE
Payer: MEDICARE

## 2025-05-13 ENCOUNTER — TELEPHONE (OUTPATIENT)
Dept: PAIN MEDICINE | Facility: CLINIC | Age: 89
End: 2025-05-13
Payer: MEDICARE

## 2025-05-13 ENCOUNTER — APPOINTMENT (OUTPATIENT)
Dept: RADIOLOGY | Facility: HOSPITAL | Age: 89
DRG: 563 | End: 2025-05-13
Payer: MEDICARE

## 2025-05-13 DIAGNOSIS — M54.16 LUMBAR RADICULOPATHY: Primary | ICD-10-CM

## 2025-05-13 DIAGNOSIS — S39.012A LUMBAR STRAIN, INITIAL ENCOUNTER: Primary | ICD-10-CM

## 2025-05-13 LAB
ANION GAP SERPL CALC-SCNC: 14 MMOL/L (ref 10–20)
APPEARANCE UR: CLEAR
BILIRUB UR STRIP.AUTO-MCNC: NEGATIVE MG/DL
BUN SERPL-MCNC: 21 MG/DL (ref 6–23)
CALCIUM SERPL-MCNC: 9 MG/DL (ref 8.6–10.3)
CHLORIDE SERPL-SCNC: 102 MMOL/L (ref 98–107)
CO2 SERPL-SCNC: 26 MMOL/L (ref 21–32)
COLOR UR: ABNORMAL
CREAT SERPL-MCNC: 0.64 MG/DL (ref 0.5–1.3)
EGFRCR SERPLBLD CKD-EPI 2021: >90 ML/MIN/1.73M*2
ERYTHROCYTE [DISTWIDTH] IN BLOOD BY AUTOMATED COUNT: 13.7 % (ref 11.5–14.5)
GLUCOSE SERPL-MCNC: 87 MG/DL (ref 74–99)
GLUCOSE UR STRIP.AUTO-MCNC: NORMAL MG/DL
HCT VFR BLD AUTO: 35.1 % (ref 41–52)
HGB BLD-MCNC: 11.8 G/DL (ref 13.5–17.5)
KETONES UR STRIP.AUTO-MCNC: ABNORMAL MG/DL
LEUKOCYTE ESTERASE UR QL STRIP.AUTO: NEGATIVE
MCH RBC QN AUTO: 32.2 PG (ref 26–34)
MCHC RBC AUTO-ENTMCNC: 33.6 G/DL (ref 32–36)
MCV RBC AUTO: 96 FL (ref 80–100)
NITRITE UR QL STRIP.AUTO: NEGATIVE
NRBC BLD-RTO: 0 /100 WBCS (ref 0–0)
PH UR STRIP.AUTO: 6.5 [PH]
PLATELET # BLD AUTO: 144 X10*3/UL (ref 150–450)
POTASSIUM SERPL-SCNC: 3.6 MMOL/L (ref 3.5–5.3)
PROT UR STRIP.AUTO-MCNC: NEGATIVE MG/DL
RBC # BLD AUTO: 3.66 X10*6/UL (ref 4.5–5.9)
RBC # UR STRIP.AUTO: NEGATIVE MG/DL
SODIUM SERPL-SCNC: 138 MMOL/L (ref 136–145)
SP GR UR STRIP.AUTO: 1.02
UROBILINOGEN UR STRIP.AUTO-MCNC: NORMAL MG/DL
WBC # BLD AUTO: 3.8 X10*3/UL (ref 4.4–11.3)

## 2025-05-13 PROCEDURE — 96376 TX/PRO/DX INJ SAME DRUG ADON: CPT

## 2025-05-13 PROCEDURE — 72131 CT LUMBAR SPINE W/O DYE: CPT | Performed by: STUDENT IN AN ORGANIZED HEALTH CARE EDUCATION/TRAINING PROGRAM

## 2025-05-13 PROCEDURE — 96374 THER/PROPH/DIAG INJ IV PUSH: CPT

## 2025-05-13 PROCEDURE — 81003 URINALYSIS AUTO W/O SCOPE: CPT | Performed by: EMERGENCY MEDICINE

## 2025-05-13 PROCEDURE — 2500000004 HC RX 250 GENERAL PHARMACY W/ HCPCS (ALT 636 FOR OP/ED): Mod: JZ | Performed by: EMERGENCY MEDICINE

## 2025-05-13 PROCEDURE — 99285 EMERGENCY DEPT VISIT HI MDM: CPT | Mod: 25 | Performed by: EMERGENCY MEDICINE

## 2025-05-13 PROCEDURE — 36415 COLL VENOUS BLD VENIPUNCTURE: CPT | Performed by: EMERGENCY MEDICINE

## 2025-05-13 PROCEDURE — 85027 COMPLETE CBC AUTOMATED: CPT | Performed by: EMERGENCY MEDICINE

## 2025-05-13 PROCEDURE — 96375 TX/PRO/DX INJ NEW DRUG ADDON: CPT

## 2025-05-13 PROCEDURE — 72131 CT LUMBAR SPINE W/O DYE: CPT

## 2025-05-13 PROCEDURE — 80048 BASIC METABOLIC PNL TOTAL CA: CPT | Performed by: EMERGENCY MEDICINE

## 2025-05-13 RX ORDER — ONDANSETRON HYDROCHLORIDE 2 MG/ML
4 INJECTION, SOLUTION INTRAVENOUS ONCE
Status: COMPLETED | OUTPATIENT
Start: 2025-05-13 | End: 2025-05-13

## 2025-05-13 RX ORDER — MORPHINE SULFATE 4 MG/ML
4 INJECTION, SOLUTION INTRAMUSCULAR; INTRAVENOUS ONCE
Status: COMPLETED | OUTPATIENT
Start: 2025-05-13 | End: 2025-05-13

## 2025-05-13 RX ADMIN — ONDANSETRON 4 MG: 2 INJECTION INTRAMUSCULAR; INTRAVENOUS at 18:45

## 2025-05-13 RX ADMIN — MORPHINE SULFATE 4 MG: 4 INJECTION, SOLUTION INTRAMUSCULAR; INTRAVENOUS at 21:09

## 2025-05-13 RX ADMIN — MORPHINE SULFATE 4 MG: 4 INJECTION, SOLUTION INTRAMUSCULAR; INTRAVENOUS at 18:45

## 2025-05-13 ASSESSMENT — PAIN DESCRIPTION - FREQUENCY: FREQUENCY: CONSTANT/CONTINUOUS

## 2025-05-13 ASSESSMENT — PAIN DESCRIPTION - ORIENTATION: ORIENTATION: LOWER;MID

## 2025-05-13 ASSESSMENT — PAIN DESCRIPTION - ONSET: ONSET: SUDDEN

## 2025-05-13 ASSESSMENT — PAIN - FUNCTIONAL ASSESSMENT: PAIN_FUNCTIONAL_ASSESSMENT: 0-10

## 2025-05-13 ASSESSMENT — PAIN DESCRIPTION - LOCATION: LOCATION: BACK

## 2025-05-13 ASSESSMENT — PAIN DESCRIPTION - DESCRIPTORS: DESCRIPTORS: SHARP;STABBING;PENETRATING

## 2025-05-13 ASSESSMENT — PAIN DESCRIPTION - PROGRESSION: CLINICAL_PROGRESSION: NOT CHANGED

## 2025-05-13 ASSESSMENT — PAIN DESCRIPTION - PAIN TYPE: TYPE: ACUTE PAIN

## 2025-05-13 ASSESSMENT — PAIN SCALES - GENERAL: PAINLEVEL_OUTOF10: 6

## 2025-05-13 NOTE — ED PROVIDER NOTES
HPI   Chief Complaint   Patient presents with    Back Pain     BIBA, patient states was bending down to get groceries and hurt his back, states unable to ambulate, states hx of back surgery 20 years ago       89-year-old male with chronic back pain presenting with acute exacerbation of back pain.  He states he was trying to unload his truck from the grocery store had several bags in his left hand and a large bottle of detergent in the right hand and when he stood up he felt pain.  He is unable to ambulate.  He states his daughter came and took a bag.  They called 911 because the patient was unable to ambulate.  Patient does have history of back surgery in the past.  He currently sees pain management.  He states his back pain has been hurting him for about a week but is worse today.  He tried to call pain management twice today with no response.  He states he is only on Tylenol at home but he does get injections periodically.  Last injection was in April.  No bladder bladder or bowel control.  No saddle anesthesia.  No direct trauma              Patient History   Medical History[1]  Surgical History[2]  Family History[3]  Social History[4]    Physical Exam   ED Triage Vitals [05/13/25 1810]   Temperature Heart Rate Respirations BP   36.8 °C (98.2 °F) 75 20 (!) 177/91      Pulse Ox Temp Source Heart Rate Source Patient Position   100 % Skin Monitor Lying      BP Location FiO2 (%)     Right arm --       Physical Exam  Constitutional:       Appearance: Normal appearance.   HENT:      Head: Normocephalic.      Mouth/Throat:      Mouth: Mucous membranes are moist.   Eyes:      Extraocular Movements: Extraocular movements intact.      Pupils: Pupils are equal, round, and reactive to light.   Cardiovascular:      Rate and Rhythm: Normal rate and regular rhythm.   Pulmonary:      Effort: Pulmonary effort is normal.   Abdominal:      Palpations: Abdomen is soft.   Musculoskeletal:      Cervical back: Normal range of motion.       Comments: Right lateral paraspinal musculature tenderness.  No midline spinal deformity or step-off.  No rashes.   Skin:     General: Skin is warm.      Capillary Refill: Capillary refill takes less than 2 seconds.   Neurological:      General: No focal deficit present.      Mental Status: He is alert.           ED Course & MDM   ED Course as of 05/13/25 2110   Tue May 13, 2025   1910 Platelets(!): 144 [DK]      ED Course User Index  [DK] Rhett CARTER Aldo, DO         Diagnoses as of 05/13/25 2110   Lumbar strain, initial encounter                 No data recorded     [place] Coma Scale Score: 15 (05/13/25 1830 : Shikha Galindo LPN)                           Medical Decision Making  Patient presenting with back pain.  Differential includes lumbar strain, UTI, pyelonephritis, kidney stone.  Basic lab work is obtained.  CBC and bmp are unremarkable.  Patient was medicated with morphine, Zofran.  Obtain CT of the lumbar spine which does not show any acute spinal abnormalities.  It is noted that he has a 0.4 cm calculus in the distal right ureter.  Patient is having difficulty giving a urine sample as he is having a lot of pain.  He requested a dose of morphine which was provided.  Discussed with the hospitalist for admission.        Procedure  Procedures         [1]   Past Medical History:  Diagnosis Date    Personal history of malignant neoplasm, unspecified     History of malignant neoplasm    Personal history of other diseases of the circulatory system     History of hypertension    Personal history of other diseases of the digestive system     History of diverticulitis    Personal history of other diseases of the digestive system     History of pancreatitis    Personal history of other diseases of the musculoskeletal system and connective tissue     History of arthritis    Personal history of other diseases of the musculoskeletal system and connective tissue     History of back pain    Personal history of  other specified conditions     History of abdominal pain    Personal history of other specified conditions     History of headache   [2]   Past Surgical History:  Procedure Laterality Date    CATARACT EXTRACTION  09/11/2017    Cataract Surgery    CHOLECYSTECTOMY  09/11/2017    Cholecystectomy    COLONOSCOPY  09/11/2017    Colonoscopy (Fiberoptic)    LUMBAR FUSION      MR HEAD ANGIO WO IV CONTRAST  12/19/2023    MR HEAD ANGIO WO IV CONTRAST 12/19/2023 [facility]    MR NECK ANGIO WO IV CONTRAST  12/19/2023    MR NECK ANGIO WO IV CONTRAST 12/19/2023 [facility]    OTHER SURGICAL HISTORY  09/11/2017    Biopsy Skin   [3] No family history on file.  [4]   Social History  Tobacco Use    Smoking status: Never     Passive exposure: Never    Smokeless tobacco: Never   Vaping Use    Vaping status: Never Used   Substance Use Topics    Alcohol use: Yes     Comment: occasionally    Drug use: Never        Rhett Carlson DO  05/13/25 2197

## 2025-05-13 NOTE — ED TRIAGE NOTES
BIBA, patient states was bending down to get groceries and hurt his back, states unable to ambulate, states hx of back surgery 20 years ago, denies any other injuries, denies blood thinners

## 2025-05-14 LAB
ALBUMIN SERPL BCP-MCNC: 3.4 G/DL (ref 3.4–5)
ALP SERPL-CCNC: 64 U/L (ref 33–136)
ALT SERPL W P-5'-P-CCNC: 40 U/L (ref 10–52)
ANION GAP SERPL CALC-SCNC: 8 MMOL/L (ref 10–20)
AST SERPL W P-5'-P-CCNC: 45 U/L (ref 9–39)
BASOPHILS # BLD AUTO: 0.02 X10*3/UL (ref 0–0.1)
BASOPHILS NFR BLD AUTO: 0.6 %
BILIRUB SERPL-MCNC: 1.5 MG/DL (ref 0–1.2)
BUN SERPL-MCNC: 21 MG/DL (ref 6–23)
CALCIUM SERPL-MCNC: 8.8 MG/DL (ref 8.6–10.3)
CHLORIDE SERPL-SCNC: 100 MMOL/L (ref 98–107)
CO2 SERPL-SCNC: 32 MMOL/L (ref 21–32)
CREAT SERPL-MCNC: 0.69 MG/DL (ref 0.5–1.3)
EGFRCR SERPLBLD CKD-EPI 2021: 90 ML/MIN/1.73M*2
EOSINOPHIL # BLD AUTO: 0.13 X10*3/UL (ref 0–0.4)
EOSINOPHIL NFR BLD AUTO: 4.2 %
ERYTHROCYTE [DISTWIDTH] IN BLOOD BY AUTOMATED COUNT: 13.5 % (ref 11.5–14.5)
GLUCOSE SERPL-MCNC: 120 MG/DL (ref 74–99)
HCT VFR BLD AUTO: 35.1 % (ref 41–52)
HGB BLD-MCNC: 11.1 G/DL (ref 13.5–17.5)
HOLD SPECIMEN: 293
IMM GRANULOCYTES # BLD AUTO: 0.01 X10*3/UL (ref 0–0.5)
IMM GRANULOCYTES NFR BLD AUTO: 0.3 % (ref 0–0.9)
LYMPHOCYTES # BLD AUTO: 0.75 X10*3/UL (ref 0.8–3)
LYMPHOCYTES NFR BLD AUTO: 24.4 %
MCH RBC QN AUTO: 31.2 PG (ref 26–34)
MCHC RBC AUTO-ENTMCNC: 31.6 G/DL (ref 32–36)
MCV RBC AUTO: 99 FL (ref 80–100)
MONOCYTES # BLD AUTO: 0.41 X10*3/UL (ref 0.05–0.8)
MONOCYTES NFR BLD AUTO: 13.3 %
NEUTROPHILS # BLD AUTO: 1.76 X10*3/UL (ref 1.6–5.5)
NEUTROPHILS NFR BLD AUTO: 57.2 %
NRBC BLD-RTO: 0 /100 WBCS (ref 0–0)
PLATELET # BLD AUTO: 141 X10*3/UL (ref 150–450)
POTASSIUM SERPL-SCNC: 4 MMOL/L (ref 3.5–5.3)
PROT SERPL-MCNC: 5.9 G/DL (ref 6.4–8.2)
RBC # BLD AUTO: 3.56 X10*6/UL (ref 4.5–5.9)
SODIUM SERPL-SCNC: 136 MMOL/L (ref 136–145)
WBC # BLD AUTO: 3.1 X10*3/UL (ref 4.4–11.3)

## 2025-05-14 PROCEDURE — 99223 1ST HOSP IP/OBS HIGH 75: CPT

## 2025-05-14 PROCEDURE — 85025 COMPLETE CBC W/AUTO DIFF WBC: CPT | Performed by: INTERNAL MEDICINE

## 2025-05-14 PROCEDURE — 96375 TX/PRO/DX INJ NEW DRUG ADDON: CPT

## 2025-05-14 PROCEDURE — 2500000004 HC RX 250 GENERAL PHARMACY W/ HCPCS (ALT 636 FOR OP/ED)

## 2025-05-14 PROCEDURE — 2500000004 HC RX 250 GENERAL PHARMACY W/ HCPCS (ALT 636 FOR OP/ED): Performed by: INTERNAL MEDICINE

## 2025-05-14 PROCEDURE — 80053 COMPREHEN METABOLIC PANEL: CPT | Performed by: INTERNAL MEDICINE

## 2025-05-14 PROCEDURE — 36415 COLL VENOUS BLD VENIPUNCTURE: CPT | Performed by: INTERNAL MEDICINE

## 2025-05-14 PROCEDURE — 96372 THER/PROPH/DIAG INJ SC/IM: CPT

## 2025-05-14 PROCEDURE — 97161 PT EVAL LOW COMPLEX 20 MIN: CPT | Mod: GP

## 2025-05-14 PROCEDURE — 1200000002 HC GENERAL ROOM WITH TELEMETRY DAILY

## 2025-05-14 PROCEDURE — 2500000001 HC RX 250 WO HCPCS SELF ADMINISTERED DRUGS (ALT 637 FOR MEDICARE OP): Performed by: INTERNAL MEDICINE

## 2025-05-14 PROCEDURE — 2500000001 HC RX 250 WO HCPCS SELF ADMINISTERED DRUGS (ALT 637 FOR MEDICARE OP)

## 2025-05-14 PROCEDURE — 97165 OT EVAL LOW COMPLEX 30 MIN: CPT | Mod: GO

## 2025-05-14 RX ORDER — DEXTROSE MONOHYDRATE AND SODIUM CHLORIDE 5; .9 G/100ML; G/100ML
100 INJECTION, SOLUTION INTRAVENOUS CONTINUOUS
Status: ACTIVE | OUTPATIENT
Start: 2025-05-14 | End: 2025-05-15

## 2025-05-14 RX ORDER — MORPHINE SULFATE 4 MG/ML
2 INJECTION, SOLUTION INTRAMUSCULAR; INTRAVENOUS EVERY 4 HOURS PRN
Status: DISCONTINUED | OUTPATIENT
Start: 2025-05-14 | End: 2025-05-16

## 2025-05-14 RX ORDER — FINASTERIDE 5 MG/1
5 TABLET, FILM COATED ORAL DAILY
Status: DISCONTINUED | OUTPATIENT
Start: 2025-05-14 | End: 2025-05-18 | Stop reason: HOSPADM

## 2025-05-14 RX ORDER — KETOROLAC TROMETHAMINE 30 MG/ML
15 INJECTION, SOLUTION INTRAMUSCULAR; INTRAVENOUS ONCE
Status: COMPLETED | OUTPATIENT
Start: 2025-05-14 | End: 2025-05-14

## 2025-05-14 RX ORDER — POLYETHYLENE GLYCOL 3350 17 G/17G
17 POWDER, FOR SOLUTION ORAL DAILY
Status: DISCONTINUED | OUTPATIENT
Start: 2025-05-14 | End: 2025-05-18 | Stop reason: HOSPADM

## 2025-05-14 RX ORDER — CYCLOBENZAPRINE HCL 10 MG
5 TABLET ORAL ONCE
Status: COMPLETED | OUTPATIENT
Start: 2025-05-14 | End: 2025-05-14

## 2025-05-14 RX ORDER — PHENAZOPYRIDINE HYDROCHLORIDE 100 MG/1
95 TABLET, FILM COATED ORAL
Status: DISCONTINUED | OUTPATIENT
Start: 2025-05-14 | End: 2025-05-17

## 2025-05-14 RX ORDER — HEPARIN SODIUM 5000 [USP'U]/ML
5000 INJECTION, SOLUTION INTRAVENOUS; SUBCUTANEOUS EVERY 8 HOURS
Status: DISCONTINUED | OUTPATIENT
Start: 2025-05-14 | End: 2025-05-18 | Stop reason: HOSPADM

## 2025-05-14 RX ORDER — ONDANSETRON HYDROCHLORIDE 2 MG/ML
4 INJECTION, SOLUTION INTRAVENOUS EVERY 4 HOURS PRN
Status: DISCONTINUED | OUTPATIENT
Start: 2025-05-14 | End: 2025-05-18 | Stop reason: HOSPADM

## 2025-05-14 RX ORDER — DEXAMETHASONE 4 MG/1
4 TABLET ORAL EVERY 12 HOURS SCHEDULED
Status: DISCONTINUED | OUTPATIENT
Start: 2025-05-14 | End: 2025-05-17

## 2025-05-14 RX ORDER — PANTOPRAZOLE SODIUM 40 MG/1
40 TABLET, DELAYED RELEASE ORAL
Status: DISCONTINUED | OUTPATIENT
Start: 2025-05-14 | End: 2025-05-18 | Stop reason: HOSPADM

## 2025-05-14 RX ORDER — ATORVASTATIN CALCIUM 20 MG/1
20 TABLET, FILM COATED ORAL DAILY
Status: DISCONTINUED | OUTPATIENT
Start: 2025-05-14 | End: 2025-05-18 | Stop reason: HOSPADM

## 2025-05-14 RX ORDER — ASPIRIN 81 MG/1
81 TABLET ORAL DAILY
Status: DISCONTINUED | OUTPATIENT
Start: 2025-05-14 | End: 2025-05-18 | Stop reason: HOSPADM

## 2025-05-14 RX ORDER — ACETAMINOPHEN 325 MG/1
650 TABLET ORAL EVERY 4 HOURS PRN
Status: DISCONTINUED | OUTPATIENT
Start: 2025-05-14 | End: 2025-05-16

## 2025-05-14 RX ORDER — MORPHINE SULFATE 4 MG/ML
4 INJECTION, SOLUTION INTRAMUSCULAR; INTRAVENOUS EVERY 4 HOURS PRN
Status: DISCONTINUED | OUTPATIENT
Start: 2025-05-14 | End: 2025-05-16

## 2025-05-14 RX ADMIN — DEXTROSE AND SODIUM CHLORIDE 100 ML/HR: 5; .9 INJECTION, SOLUTION INTRAVENOUS at 17:54

## 2025-05-14 RX ADMIN — MORPHINE SULFATE 2 MG: 4 INJECTION, SOLUTION INTRAMUSCULAR; INTRAVENOUS at 10:18

## 2025-05-14 RX ADMIN — HEPARIN SODIUM 5000 UNITS: 5000 INJECTION, SOLUTION INTRAVENOUS; SUBCUTANEOUS at 22:21

## 2025-05-14 RX ADMIN — DEXAMETHASONE 4 MG: 4 TABLET ORAL at 20:51

## 2025-05-14 RX ADMIN — ATORVASTATIN CALCIUM 20 MG: 20 TABLET, FILM COATED ORAL at 10:00

## 2025-05-14 RX ADMIN — ASPIRIN 81 MG: 81 TABLET, COATED ORAL at 10:00

## 2025-05-14 RX ADMIN — HEPARIN SODIUM 5000 UNITS: 5000 INJECTION, SOLUTION INTRAVENOUS; SUBCUTANEOUS at 06:20

## 2025-05-14 RX ADMIN — PANTOPRAZOLE SODIUM 40 MG: 40 TABLET, DELAYED RELEASE ORAL at 17:54

## 2025-05-14 RX ADMIN — FINASTERIDE 5 MG: 5 TABLET, FILM COATED ORAL at 10:00

## 2025-05-14 RX ADMIN — KETOROLAC TROMETHAMINE 15 MG: 30 INJECTION, SOLUTION INTRAMUSCULAR at 01:54

## 2025-05-14 RX ADMIN — MORPHINE SULFATE 2 MG: 4 INJECTION, SOLUTION INTRAMUSCULAR; INTRAVENOUS at 20:58

## 2025-05-14 RX ADMIN — POLYETHYLENE GLYCOL 3350 17 G: 17 POWDER, FOR SOLUTION ORAL at 10:00

## 2025-05-14 RX ADMIN — CYCLOBENZAPRINE 5 MG: 10 TABLET, FILM COATED ORAL at 01:54

## 2025-05-14 RX ADMIN — HEPARIN SODIUM 5000 UNITS: 5000 INJECTION, SOLUTION INTRAVENOUS; SUBCUTANEOUS at 14:34

## 2025-05-14 SDOH — SOCIAL STABILITY: SOCIAL INSECURITY
WITHIN THE LAST YEAR, HAVE YOU BEEN RAPED OR FORCED TO HAVE ANY KIND OF SEXUAL ACTIVITY BY YOUR PARTNER OR EX-PARTNER?: NO

## 2025-05-14 SDOH — SOCIAL STABILITY: SOCIAL NETWORK
DO YOU BELONG TO ANY CLUBS OR ORGANIZATIONS SUCH AS CHURCH GROUPS, UNIONS, FRATERNAL OR ATHLETIC GROUPS, OR SCHOOL GROUPS?: NO

## 2025-05-14 SDOH — SOCIAL STABILITY: SOCIAL INSECURITY
WITHIN THE LAST YEAR, HAVE YOU BEEN KICKED, HIT, SLAPPED, OR OTHERWISE PHYSICALLY HURT BY YOUR PARTNER OR EX-PARTNER?: NO

## 2025-05-14 SDOH — SOCIAL STABILITY: SOCIAL NETWORK: HOW OFTEN DO YOU ATTEND MEETINGS OF THE CLUBS OR ORGANIZATIONS YOU BELONG TO?: PATIENT DECLINED

## 2025-05-14 SDOH — SOCIAL STABILITY: SOCIAL INSECURITY: WITHIN THE LAST YEAR, HAVE YOU BEEN AFRAID OF YOUR PARTNER OR EX-PARTNER?: NO

## 2025-05-14 SDOH — ECONOMIC STABILITY: FOOD INSECURITY: WITHIN THE PAST 12 MONTHS, THE FOOD YOU BOUGHT JUST DIDN'T LAST AND YOU DIDN'T HAVE MONEY TO GET MORE.: NEVER TRUE

## 2025-05-14 SDOH — SOCIAL STABILITY: SOCIAL INSECURITY: HAS ANYONE EVER THREATENED TO HURT YOUR FAMILY OR YOUR PETS?: NO

## 2025-05-14 SDOH — SOCIAL STABILITY: SOCIAL INSECURITY: DO YOU FEEL UNSAFE GOING BACK TO THE PLACE WHERE YOU ARE LIVING?: NO

## 2025-05-14 SDOH — HEALTH STABILITY: MENTAL HEALTH
DO YOU FEEL STRESS - TENSE, RESTLESS, NERVOUS, OR ANXIOUS, OR UNABLE TO SLEEP AT NIGHT BECAUSE YOUR MIND IS TROUBLED ALL THE TIME - THESE DAYS?: ONLY A LITTLE

## 2025-05-14 SDOH — ECONOMIC STABILITY: TRANSPORTATION INSECURITY: IN THE PAST 12 MONTHS, HAS LACK OF TRANSPORTATION KEPT YOU FROM MEDICAL APPOINTMENTS OR FROM GETTING MEDICATIONS?: NO

## 2025-05-14 SDOH — ECONOMIC STABILITY: FOOD INSECURITY: WITHIN THE PAST 12 MONTHS, YOU WORRIED THAT YOUR FOOD WOULD RUN OUT BEFORE YOU GOT THE MONEY TO BUY MORE.: NEVER TRUE

## 2025-05-14 SDOH — HEALTH STABILITY: MENTAL HEALTH: HOW OFTEN DO YOU HAVE A DRINK CONTAINING ALCOHOL?: MONTHLY OR LESS

## 2025-05-14 SDOH — SOCIAL STABILITY: SOCIAL INSECURITY: ARE YOU MARRIED, WIDOWED, DIVORCED, SEPARATED, NEVER MARRIED, OR LIVING WITH A PARTNER?: MARRIED

## 2025-05-14 SDOH — HEALTH STABILITY: PHYSICAL HEALTH: ON AVERAGE, HOW MANY MINUTES DO YOU ENGAGE IN EXERCISE AT THIS LEVEL?: PATIENT UNABLE TO ANSWER

## 2025-05-14 SDOH — HEALTH STABILITY: MENTAL HEALTH: HOW MANY DRINKS CONTAINING ALCOHOL DO YOU HAVE ON A TYPICAL DAY WHEN YOU ARE DRINKING?: 1 OR 2

## 2025-05-14 SDOH — SOCIAL STABILITY: SOCIAL NETWORK: HOW OFTEN DO YOU GET TOGETHER WITH FRIENDS OR RELATIVES?: PATIENT DECLINED

## 2025-05-14 SDOH — HEALTH STABILITY: PHYSICAL HEALTH
HOW OFTEN DO YOU NEED TO HAVE SOMEONE HELP YOU WHEN YOU READ INSTRUCTIONS, PAMPHLETS, OR OTHER WRITTEN MATERIAL FROM YOUR DOCTOR OR PHARMACY?: RARELY

## 2025-05-14 SDOH — ECONOMIC STABILITY: FOOD INSECURITY: HOW HARD IS IT FOR YOU TO PAY FOR THE VERY BASICS LIKE FOOD, HOUSING, MEDICAL CARE, AND HEATING?: NOT HARD AT ALL

## 2025-05-14 SDOH — SOCIAL STABILITY: SOCIAL INSECURITY: WITHIN THE LAST YEAR, HAVE YOU BEEN HUMILIATED OR EMOTIONALLY ABUSED IN OTHER WAYS BY YOUR PARTNER OR EX-PARTNER?: NO

## 2025-05-14 SDOH — SOCIAL STABILITY: SOCIAL INSECURITY: ARE YOU OR HAVE YOU BEEN THREATENED OR ABUSED PHYSICALLY, EMOTIONALLY, OR SEXUALLY BY ANYONE?: NO

## 2025-05-14 SDOH — ECONOMIC STABILITY: INCOME INSECURITY: IN THE PAST 12 MONTHS HAS THE ELECTRIC, GAS, OIL, OR WATER COMPANY THREATENED TO SHUT OFF SERVICES IN YOUR HOME?: NO

## 2025-05-14 SDOH — ECONOMIC STABILITY: HOUSING INSECURITY: IN THE LAST 12 MONTHS, WAS THERE A TIME WHEN YOU WERE NOT ABLE TO PAY THE MORTGAGE OR RENT ON TIME?: NO

## 2025-05-14 SDOH — HEALTH STABILITY: MENTAL HEALTH: HOW OFTEN DO YOU HAVE SIX OR MORE DRINKS ON ONE OCCASION?: LESS THAN MONTHLY

## 2025-05-14 SDOH — SOCIAL STABILITY: SOCIAL NETWORK: IN A TYPICAL WEEK, HOW MANY TIMES DO YOU TALK ON THE PHONE WITH FAMILY, FRIENDS, OR NEIGHBORS?: ONCE A WEEK

## 2025-05-14 SDOH — SOCIAL STABILITY: SOCIAL NETWORK: HOW OFTEN DO YOU ATTEND CHURCH OR RELIGIOUS SERVICES?: PATIENT DECLINED

## 2025-05-14 SDOH — SOCIAL STABILITY: SOCIAL INSECURITY: WERE YOU ABLE TO COMPLETE ALL THE BEHAVIORAL HEALTH SCREENINGS?: YES

## 2025-05-14 SDOH — ECONOMIC STABILITY: HOUSING INSECURITY: IN THE PAST 12 MONTHS, HOW MANY TIMES HAVE YOU MOVED WHERE YOU WERE LIVING?: 0

## 2025-05-14 SDOH — HEALTH STABILITY: PHYSICAL HEALTH
ON AVERAGE, HOW MANY DAYS PER WEEK DO YOU ENGAGE IN MODERATE TO STRENUOUS EXERCISE (LIKE A BRISK WALK)?: PATIENT DECLINED

## 2025-05-14 SDOH — SOCIAL STABILITY: SOCIAL INSECURITY: HAVE YOU HAD THOUGHTS OF HARMING ANYONE ELSE?: NO

## 2025-05-14 SDOH — SOCIAL STABILITY: SOCIAL INSECURITY: HAVE YOU HAD ANY THOUGHTS OF HARMING ANYONE ELSE?: NO

## 2025-05-14 SDOH — ECONOMIC STABILITY: HOUSING INSECURITY: AT ANY TIME IN THE PAST 12 MONTHS, WERE YOU HOMELESS OR LIVING IN A SHELTER (INCLUDING NOW)?: NO

## 2025-05-14 SDOH — SOCIAL STABILITY: SOCIAL INSECURITY: DOES ANYONE TRY TO KEEP YOU FROM HAVING/CONTACTING OTHER FRIENDS OR DOING THINGS OUTSIDE YOUR HOME?: NO

## 2025-05-14 SDOH — SOCIAL STABILITY: SOCIAL INSECURITY: DO YOU FEEL ANYONE HAS EXPLOITED OR TAKEN ADVANTAGE OF YOU FINANCIALLY OR OF YOUR PERSONAL PROPERTY?: NO

## 2025-05-14 SDOH — SOCIAL STABILITY: SOCIAL INSECURITY: ABUSE: ADULT

## 2025-05-14 SDOH — SOCIAL STABILITY: SOCIAL INSECURITY: ARE THERE ANY APPARENT SIGNS OF INJURIES/BEHAVIORS THAT COULD BE RELATED TO ABUSE/NEGLECT?: NO

## 2025-05-14 ASSESSMENT — COGNITIVE AND FUNCTIONAL STATUS - GENERAL
CLIMB 3 TO 5 STEPS WITH RAILING: A LOT
TOILETING: A LOT
DAILY ACTIVITIY SCORE: 13
EATING MEALS: A LITTLE
HELP NEEDED FOR BATHING: A LOT
TURNING FROM BACK TO SIDE WHILE IN FLAT BAD: A LOT
MOBILITY SCORE: 9
DRESSING REGULAR LOWER BODY CLOTHING: TOTAL
MOVING FROM LYING ON BACK TO SITTING ON SIDE OF FLAT BED WITH BEDRAILS: A LOT
TOILETING: TOTAL
TOILETING: TOTAL
STANDING UP FROM CHAIR USING ARMS: A LOT
HELP NEEDED FOR BATHING: A LOT
DRESSING REGULAR UPPER BODY CLOTHING: A LOT
DRESSING REGULAR UPPER BODY CLOTHING: A LOT
DRESSING REGULAR LOWER BODY CLOTHING: A LOT
HELP NEEDED FOR BATHING: A LOT
EATING MEALS: A LITTLE
DRESSING REGULAR UPPER BODY CLOTHING: A LOT
MOVING TO AND FROM BED TO CHAIR: A LOT
WALKING IN HOSPITAL ROOM: A LOT
HELP NEEDED FOR BATHING: A LOT
STANDING UP FROM CHAIR USING ARMS: A LOT
MOVING FROM LYING ON BACK TO SITTING ON SIDE OF FLAT BED WITH BEDRAILS: A LOT
PERSONAL GROOMING: A LOT
MOVING FROM LYING ON BACK TO SITTING ON SIDE OF FLAT BED WITH BEDRAILS: A LOT
STANDING UP FROM CHAIR USING ARMS: A LOT
CLIMB 3 TO 5 STEPS WITH RAILING: TOTAL
STANDING UP FROM CHAIR USING ARMS: TOTAL
MOBILITY SCORE: 11
PATIENT BASELINE BEDBOUND: NO
DRESSING REGULAR UPPER BODY CLOTHING: A LOT
MOBILITY SCORE: 10
MOVING FROM LYING ON BACK TO SITTING ON SIDE OF FLAT BED WITH BEDRAILS: A LOT
PERSONAL GROOMING: TOTAL
PERSONAL GROOMING: A LITTLE
PERSONAL GROOMING: A LOT
TOILETING: TOTAL
WALKING IN HOSPITAL ROOM: TOTAL
TURNING FROM BACK TO SIDE WHILE IN FLAT BAD: A LOT
STANDING UP FROM CHAIR USING ARMS: A LOT
CLIMB 3 TO 5 STEPS WITH RAILING: TOTAL
MOVING TO AND FROM BED TO CHAIR: A LOT
TURNING FROM BACK TO SIDE WHILE IN FLAT BAD: A LOT
DRESSING REGULAR UPPER BODY CLOTHING: A LOT
WALKING IN HOSPITAL ROOM: TOTAL
DRESSING REGULAR LOWER BODY CLOTHING: A LOT
PERSONAL GROOMING: A LITTLE
DAILY ACTIVITIY SCORE: 12
TOILETING: A LOT
MOVING TO AND FROM BED TO CHAIR: A LOT
MOVING TO AND FROM BED TO CHAIR: A LOT
HELP NEEDED FOR BATHING: TOTAL
DRESSING REGULAR LOWER BODY CLOTHING: A LOT
MOVING FROM LYING ON BACK TO SITTING ON SIDE OF FLAT BED WITH BEDRAILS: A LOT
MOBILITY SCORE: 10
CLIMB 3 TO 5 STEPS WITH RAILING: TOTAL
DAILY ACTIVITIY SCORE: 11
DAILY ACTIVITIY SCORE: 14
CLIMB 3 TO 5 STEPS WITH RAILING: TOTAL
WALKING IN HOSPITAL ROOM: TOTAL
DRESSING REGULAR LOWER BODY CLOTHING: A LOT
EATING MEALS: A LITTLE
MOVING TO AND FROM BED TO CHAIR: A LOT
DAILY ACTIVITIY SCORE: 13
MOBILITY SCORE: 12
TURNING FROM BACK TO SIDE WHILE IN FLAT BAD: A LOT
TURNING FROM BACK TO SIDE WHILE IN FLAT BAD: A LOT
WALKING IN HOSPITAL ROOM: A LOT

## 2025-05-14 ASSESSMENT — ENCOUNTER SYMPTOMS
ALLERGIC/IMMUNOLOGIC NEGATIVE: 1
ACTIVITY CHANGE: 1
SHORTNESS OF BREATH: 1
EYES NEGATIVE: 1
DIFFICULTY URINATING: 1
WEAKNESS: 1
FLANK PAIN: 1
FATIGUE: 1
FREQUENCY: 1
HEMATOLOGIC/LYMPHATIC NEGATIVE: 1
BACK PAIN: 1
ABDOMINAL PAIN: 1
PSYCHIATRIC NEGATIVE: 1

## 2025-05-14 ASSESSMENT — ACTIVITIES OF DAILY LIVING (ADL)
HEARING - LEFT EAR: HEARING AID
HEARING - RIGHT EAR: HEARING AID
WALKS IN HOME: DEPENDENT
ADEQUATE_TO_COMPLETE_ADL: YES
TOILETING: NEEDS ASSISTANCE
LACK_OF_TRANSPORTATION: NO
GROOMING: NEEDS ASSISTANCE
DRESSING YOURSELF: NEEDS ASSISTANCE
BATHING: NEEDS ASSISTANCE
FEEDING YOURSELF: INDEPENDENT
PATIENT'S MEMORY ADEQUATE TO SAFELY COMPLETE DAILY ACTIVITIES?: YES
JUDGMENT_ADEQUATE_SAFELY_COMPLETE_DAILY_ACTIVITIES: YES

## 2025-05-14 ASSESSMENT — PAIN - FUNCTIONAL ASSESSMENT
PAIN_FUNCTIONAL_ASSESSMENT: 0-10

## 2025-05-14 ASSESSMENT — PATIENT HEALTH QUESTIONNAIRE - PHQ9
2. FEELING DOWN, DEPRESSED OR HOPELESS: NOT AT ALL
SUM OF ALL RESPONSES TO PHQ9 QUESTIONS 1 & 2: 0
1. LITTLE INTEREST OR PLEASURE IN DOING THINGS: NOT AT ALL

## 2025-05-14 ASSESSMENT — PAIN DESCRIPTION - ORIENTATION: ORIENTATION: RIGHT;LEFT;LOWER

## 2025-05-14 ASSESSMENT — LIFESTYLE VARIABLES
SUBSTANCE_ABUSE_PAST_12_MONTHS: NO
PRESCIPTION_ABUSE_PAST_12_MONTHS: NO
AUDIT-C TOTAL SCORE: 2
HAVE PEOPLE ANNOYED YOU BY CRITICIZING YOUR DRINKING: NO
AUDIT-C TOTAL SCORE: 2
HAVE YOU EVER FELT YOU SHOULD CUT DOWN ON YOUR DRINKING: NO
AUDIT-C TOTAL SCORE: 2
HOW OFTEN DO YOU HAVE A DRINK CONTAINING ALCOHOL: MONTHLY OR LESS
TOTAL SCORE: 0
SKIP TO QUESTIONS 9-10: 0
SKIP TO QUESTIONS 9-10: 0
EVER FELT BAD OR GUILTY ABOUT YOUR DRINKING: NO
HOW OFTEN DO YOU HAVE 6 OR MORE DRINKS ON ONE OCCASION: LESS THAN MONTHLY
HOW MANY STANDARD DRINKS CONTAINING ALCOHOL DO YOU HAVE ON A TYPICAL DAY: 1 OR 2
EVER HAD A DRINK FIRST THING IN THE MORNING TO STEADY YOUR NERVES TO GET RID OF A HANGOVER: NO

## 2025-05-14 ASSESSMENT — PAIN DESCRIPTION - DESCRIPTORS
DESCRIPTORS: ACHING;DISCOMFORT
DESCRIPTORS: ACHING
DESCRIPTORS: ACHING;DISCOMFORT

## 2025-05-14 ASSESSMENT — PAIN SCALES - GENERAL
PAINLEVEL_OUTOF10: 5 - MODERATE PAIN
PAINLEVEL_OUTOF10: 3
PAINLEVEL_OUTOF10: 5 - MODERATE PAIN
PAINLEVEL_OUTOF10: 0 - NO PAIN
PAINLEVEL_OUTOF10: 5 - MODERATE PAIN

## 2025-05-14 ASSESSMENT — PAIN DESCRIPTION - LOCATION: LOCATION: BACK

## 2025-05-14 NOTE — CARE PLAN
The patient's goals for the shift include     Problem: Pain - Adult  Goal: Verbalizes/displays adequate comfort level or baseline comfort level  Outcome: Progressing  Flowsheets (Taken 5/14/2025 1612)  Verbalizes/displays adequate comfort level or baseline comfort level: Assess pain using appropriate pain scale     Problem: Fall/Injury  Goal: Not fall by end of shift  Outcome: Progressing     Problem: Pain  Goal: Turns in bed with improved pain control throughout the shift  Outcome: Progressing     Problem: Skin  Goal: Prevent/minimize sheer/friction injuries  Outcome: Progressing  Flowsheets (Taken 5/14/2025 1613)  Prevent/minimize sheer/friction injuries:   HOB 30 degrees or less   Turn/reposition every 2 hours/use positioning/transfer devices     The clinical goals for the shift include pt will remain hemodynamically stable throughout shift.

## 2025-05-14 NOTE — H&P
History Of Present Illness  Colton Jiménez is a 89 y.o. male with a past medical history of HTN, BPH, kidney stones, basal cell cancer on nose, lumbar radiculopathy s/p lumbar spinal fusion, cervical spondylosis with myelopathy, who presented to [facility] ED today with acute exacerbation of back pain. He states he was trying to unload his truck from the grocery store, had several bags in his left hand and a large bottle of detergent in the right hand and when he stood up he felt pain. He is unable to ambulate. He states his daughter came and took a bag. They called 911 because the patient was unable to ambulate. Patient does have history of back surgery in the past. He currently sees pain management. He states his back pain has been hurting him for about a week but is worse today. He tried to call pain management twice today with no response. He states he is only on Tylenol at home but he does get injections periodically. Last injection was in April. No loss of bladder or bowel control. No saddle anesthesia. No direct trauma. Denies urinary symptoms. Denies fever, chills, chest pain, shortness of breath, abdominal pain, nausea, vomiting, diarrhea, or constipation.     ED course: Hemodynamically stable. Temp 36.8C, /91, HR 75, RR 20, 100% RA. EKG unavailable for my review. Labs: WBC 3.8. RBC 3.66, H&H 11.8/35.1, platelets 144. UA: 1+ketones. See imaging results below. Morphine and zofran given in ED. Pt is being admitted to Dr. Moore who will continue to follow. I was asked to do the H&P and initial assessment.      Past Medical History  Medical History[1]  As above  Surgical History  Surgical History[2]     Social History  He reports that he has never smoked. He has never been exposed to tobacco smoke. He has never used smokeless tobacco. He reports current alcohol use. He reports that he does not use drugs.    Family History  Family History[3]     Allergies  Patient has no known allergies.    Review of Systems    Constitutional:  Positive for activity change and fatigue.   HENT:  Positive for congestion.    Eyes: Negative.    Respiratory:  Positive for shortness of breath.    Cardiovascular:  Positive for leg swelling.   Gastrointestinal:  Positive for abdominal pain.   Endocrine: Positive for cold intolerance.   Genitourinary:  Positive for decreased urine volume, difficulty urinating, flank pain and frequency.   Musculoskeletal:  Positive for back pain and gait problem.   Skin: Negative.    Allergic/Immunologic: Negative.    Neurological:  Positive for weakness.   Hematological: Negative.    Psychiatric/Behavioral: Negative.       10 point review of symptoms was performed and is negative except for what is stated in the HPI above.    Physical Exam  Vitals and nursing note reviewed.   Constitutional:       Appearance: Normal appearance.      Comments: Pleasant elderly male resting in bed in discomfort   HENT:      Head: Normocephalic and atraumatic.      Nose: Nose normal.      Mouth/Throat:      Mouth: Mucous membranes are moist.      Pharynx: Oropharynx is clear.   Eyes:      Extraocular Movements: Extraocular movements intact.      Conjunctiva/sclera: Conjunctivae normal.      Pupils: Pupils are equal, round, and reactive to light.   Cardiovascular:      Rate and Rhythm: Normal rate and regular rhythm.      Pulses: Normal pulses.      Heart sounds: Normal heart sounds.   Pulmonary:      Effort: Pulmonary effort is normal.      Breath sounds: Normal breath sounds.   Abdominal:      General: Abdomen is flat. Bowel sounds are normal.      Palpations: Abdomen is soft.   Musculoskeletal:         General: Normal range of motion.      Cervical back: Normal range of motion and neck supple.      Comments: Right paraspinal lumbar tenderness, limited ROM due to pain. NV intact in lower extremities.   Skin:     General: Skin is warm and dry.      Capillary Refill: Capillary refill takes less than 2 seconds.   Neurological:       "General: No focal deficit present.      Mental Status: He is alert and oriented to person, place, and time.   Psychiatric:         Mood and Affect: Mood normal.         Behavior: Behavior normal.         Thought Content: Thought content normal.         Judgment: Judgment normal.          Last Recorded Vitals  Blood pressure 107/56, pulse 61, temperature 36.4 °C (97.5 °F), temperature source Temporal, resp. rate 20, height 1.803 m (5' 11\"), weight 64.4 kg (142 lb), SpO2 98%.    Relevant Results  Results for orders placed or performed during the hospital encounter of 05/13/25 (from the past 24 hours)   CBC   Result Value Ref Range    WBC 3.8 (L) 4.4 - 11.3 x10*3/uL    nRBC 0.0 0.0 - 0.0 /100 WBCs    RBC 3.66 (L) 4.50 - 5.90 x10*6/uL    Hemoglobin 11.8 (L) 13.5 - 17.5 g/dL    Hematocrit 35.1 (L) 41.0 - 52.0 %    MCV 96 80 - 100 fL    MCH 32.2 26.0 - 34.0 pg    MCHC 33.6 32.0 - 36.0 g/dL    RDW 13.7 11.5 - 14.5 %    Platelets 144 (L) 150 - 450 x10*3/uL   Basic metabolic panel   Result Value Ref Range    Glucose 87 74 - 99 mg/dL    Sodium 138 136 - 145 mmol/L    Potassium 3.6 3.5 - 5.3 mmol/L    Chloride 102 98 - 107 mmol/L    Bicarbonate 26 21 - 32 mmol/L    Anion Gap 14 10 - 20 mmol/L    Urea Nitrogen 21 6 - 23 mg/dL    Creatinine 0.64 0.50 - 1.30 mg/dL    eGFR >90 >60 mL/min/1.73m*2    Calcium 9.0 8.6 - 10.3 mg/dL   Urinalysis with Reflex Culture and Microscopic   Result Value Ref Range    Color, Urine Light-Yellow Light-Yellow, Yellow, Dark-Yellow    Appearance, Urine Clear Clear    Specific Gravity, Urine 1.022 1.005 - 1.035    pH, Urine 6.5 5.0, 5.5, 6.0, 6.5, 7.0, 7.5, 8.0    Protein, Urine NEGATIVE NEGATIVE, 10 (TRACE), 20 (TRACE) mg/dL    Glucose, Urine Normal Normal mg/dL    Blood, Urine NEGATIVE NEGATIVE mg/dL    Ketones, Urine 10 (1+) (A) NEGATIVE mg/dL    Bilirubin, Urine NEGATIVE NEGATIVE mg/dL    Urobilinogen, Urine Normal Normal mg/dL    Nitrite, Urine NEGATIVE NEGATIVE    Leukocyte Esterase, Urine " NEGATIVE NEGATIVE   Extra Urine Gray Tube   Result Value Ref Range    Extra Tube 293      CT lumbar spine wo IV contrast  Result Date: 5/13/2025  Interpreted By:  Jack Abdi, STUDY: CT LUMBAR SPINE WO IV CONTRAST;  5/13/2025 7:09 pm   INDICATION: Signs/Symptoms:back pain.     COMPARISON: None.   ACCESSION NUMBER(S): [id number]   ORDERING CLINICIAN: CAMRON SCHULTZ   TECHNIQUE: Axial noncontrast images of the lumbar spine with coronal and sagittal reconstructed images.   FINDINGS: VISUALIZED ABDOMEN: There are numerous nonobstructing bilateral renal calculi measuring up to 0.8 cm in the right kidney and 0.6 cm the left kidney. There is new 0.4 cm calculus in the distal right ureter prominent intrahepatic and extrahepatic bile ducts are likely secondary to post-cholecystectomy status. Moderate aortic atherosclerosis without AAA. There is colonic diverticulosis.   PARASPINAL SOFT TISSUES: No significant abnormality.   ALIGNMENT: Grade 1 retrolisthesis of L3 on L4 and anterolisthesis of L5 on S1 (unchanged). No traumatic spondylolisthesis or traumatic facet widening.   POSTOPERATIVE CHANGES: Redemonstrated L4-S1 posterior fusion and L4-L5 and L5 S S1 discectomies. There was solid ankylosis across the anterior and posterior elements. The hardware is intact without periprosthetic lucency or fracture.   VERTEBRAE: Not repeated bone. 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 There is ossification in the interspinous spaces at L4-L5 and L5-S1 similar to prior study. No acute fractures. Vertebral body heights are maintained.   SPINAL CANAL/INTERVERTEBRAL DISCS: There is no high-grade canal stenosis. There are disc spur complexes at L2-L3 and L3-L4 which contribute to mild central canal stenosis in conjunction with ligamentum flavum thickening and which appears similar to 12/18/2023.   NEURAL FORAMINA: There is multilevel hypertrophic facet arthropathy of mild  degree at L1-L2 and L2-L3, moderate at L3-L4. This contributes to varying degrees of neural foraminal stenosis in conjunction with disc disease and is notably most advanced at L3-L4 with moderate bilateral neural foraminal stenosis, also similar to prior study.   SACROILIAC JOINTS: Intact.       New 0.4 cm calculus in the distal right ureter without hydronephrosis. There are numerous nonobstructing bilateral renal calculi that remain.   Multilevel spondylotic changes of the lumbar spine which are similar to prior study as described above and are most pronounced at L3-L4 and L2-L3 with mild canal stenosis in up to moderate bilateral foraminal stenosis at L3-L4.   Status post L4-S1 posterior fusion without evidence of hardware complication.   MACRO: None.   Signed by: Jack Abdi 5/13/2025 7:34 PM Dictation workstation:   [id number]   Assessment & Plan  Lumbar strain, initial encounter    89 year old male who presented to ED today with acute exacerbation of back pain and unable to ambulate. PT/OT/SW ordered. Patient will be hospitalized for further medical management.      Lumbar strain  Ambulatory dysfunction  Hx of chronic back pain in pain management  Admit to OBS/telemety to Dr. Moore   Defer consults to attending per request  See imaging results   Analgesics/Zofran PRN   PT/OT/SW  Repeat labs in AM     Pancytopenia, chronic  WBC 3.8, H&H 11.8/35.1, platelets 144  Continue to monitor     Chronic conditions   #HTN, BPH, kidney stones, basal cell carcinoma  Continue home medications as appropriate when nursing completes home med rec   Full code      DVT PPX   Ambulation   SCD's as tolerated          had concerns including Back Pain (BIBA, patient states was bending down to get groceries and hurt his back, states unable to ambulate, states hx of back surgery 20 years ago).       Problem List Items Addressed This Visit       * (Principal) Lumbar strain, initial encounter - Primary       HPI       Back Pain      Additional comments: BIBA, patient states was bending down to get groceries and hurt his back, states unable to ambulate, states hx of back surgery 20 years ago          Last edited by Shikha Galindo LPN on 5/13/2025  6:28 PM.          Problem List as of 5/14/2025 Reviewed: 5/13/2025  9:07 PM by Ron Arzola MD      Fall, initial encounter    Myelomalacia of cervical cord    Cervical spondylosis with myelopathy    History of lumbar spinal fusion    * (Principal) Lumbar strain, initial encounter       Active Ambulatory Problems     Diagnosis Date Noted    Fall, initial encounter 12/18/2023    Myelomalacia of cervical cord 01/09/2024    Cervical spondylosis with myelopathy 01/09/2024    History of lumbar spinal fusion 01/09/2024     Resolved Ambulatory Problems     Diagnosis Date Noted    No Resolved Ambulatory Problems     Past Medical History:   Diagnosis Date    Personal history of malignant neoplasm, unspecified     Personal history of other diseases of the circulatory system     Personal history of other diseases of the digestive system     Personal history of other diseases of the digestive system     Personal history of other diseases of the musculoskeletal system and connective tissue     Personal history of other diseases of the musculoskeletal system and connective tissue     Personal history of other specified conditions     Personal history of other specified conditions            Active Orders   Diet    Adult diet Regular     Frequency: Effective now     Number of Occurrences: Until Specified   Nursing    Activity (specify) Activity With Exceptions; Up in chair, Ambulate with assistance     Frequency: Until discontinued     Number of Occurrences: Until Specified    Height on admission     Frequency: Once     Number of Occurrences: 1 Occurrences    Notify provider     Frequency: Until discontinued     Number of Occurrences: Until Specified    Nursing communication Notify LUIS when home med rec is  complete.     Frequency: Until discontinued     Number of Occurrences: Until Specified     Order Comments: Notify LUIS when home med rec is complete.      Pain Assessment     Frequency: PRN     Number of Occurrences: Until Specified    Vital Signs     Frequency: q8h     Number of Occurrences: Until Specified    Weight on admission     Frequency: Once     Number of Occurrences: 1 Occurrences   Consult    Inpatient consult to Social Work and TCC     Frequency: Once     Number of Occurrences: 1 Occurrences    Inpatient consult to Urology     Frequency: Once     Number of Occurrences: 1 Occurrences   Nourishments    May Participate in Room Service     Frequency: Once     Number of Occurrences: 1 Occurrences   OT    OT eval and treat     Frequency: Until therapy completed     Number of Occurrences: 1 Occurrences   PT    PT eval and treat     Frequency: Until therapy completed     Number of Occurrences: 1 Occurrences   Respiratory Care    Pulse oximetry, spot     Frequency: Once     Number of Occurrences: 1 Occurrences   ECG    Electrocardiogram, 12-lead PRN ACS symptoms     Frequency: PRN     Number of Occurrences: Until Specified     Order Comments: Notify provider if performed.     Telemetry    Telemetry monitoring - Floor only     Frequency: Until discontinued     Number of Occurrences: 3 Days   Medications    acetaminophen (Tylenol) tablet 650 mg     Frequency: q4h PRN     Dose: 650 mg     Route: oral    aspirin EC tablet 81 mg     Frequency: Daily     Dose: 81 mg     Route: oral    atorvastatin (Lipitor) tablet 20 mg     Frequency: Daily     Dose: 20 mg     Route: oral    finasteride (Proscar) tablet 5 mg     Frequency: Daily     Dose: 5 mg     Route: oral    heparin (porcine) injection 5,000 Units     Frequency: q8h     Dose: 5,000 Units     Route: subcutaneous    morphine injection 2 mg     Linked Order: Or     Frequency: q4h PRN     Dose: 2 mg     Route: intravenous    morphine injection 4 mg     Linked Order: Or  "    Frequency: q4h PRN     Dose: 4 mg     Route: intravenous    ondansetron (Zofran) injection 4 mg     Frequency: q4h PRN     Dose: 4 mg     Route: intravenous    polyethylene glycol (Glycolax, Miralax) packet 17 g     Frequency: Daily     Dose: 17 g     Route: oral     Dietary Orders (From admission, onward)       Start     Ordered    05/14/25 0631  May Participate in Room Service  ( ROOM SERVICE MAY PARTICIPATE)  Once        Question:  .  Answer:  Yes    05/14/25 0630 05/14/25 0112  Adult diet Regular  Diet effective now        Question:  Diet type  Answer:  Regular    05/14/25 0113                  89 yrs@  ADMITDTTM@  Lumbar strain, initial encounter [S39.012A]  [unfilled]  Weight: 64.4 kg (142 lb)     Vitals:    05/13/25 1810 05/14/25 0513 05/14/25 0601 05/14/25 1215   BP: (!) 177/91 165/80 152/74 107/56   Pulse: 75 70 56 61   Resp: 20 19  20   Temp: 36.8 °C (98.2 °F) 36.8 °C (98.2 °F) 36.5 °C (97.7 °F) 36.4 °C (97.5 °F)   TempSrc: Skin Tympanic  Temporal   SpO2: 100% 99% 98% 98%   Weight: 64.4 kg (142 lb)      Height: 1.803 m (5' 11\")               Chief Complaint    Back Pain         Patient seen resting in bed with head of bed elevated, no s/s or c/o acute difficulties at this time.  Vital signs for last 24 hours Temp:  [36.4 °C (97.5 °F)-36.8 °C (98.2 °F)] 36.4 °C (97.5 °F)  Heart Rate:  [56-75] 61  Resp:  [19-20] 20  BP: (107-177)/(56-91) 107/56    No intake/output data recorded.  Patient still requiring frequent cardiac and SPO2 monitoring. Continue aggressive pulmonary hygiene and oral hygiene. Off loading as tolerated for skin integrity. Medications and labs reviewed-    Patient recently received an antibiotic (last 12 hours)       None            Results for orders placed or performed during the hospital encounter of 05/13/25 (from the past 96 hours)   CBC   Result Value Ref Range    WBC 3.8 (L) 4.4 - 11.3 x10*3/uL    nRBC 0.0 0.0 - 0.0 /100 WBCs    RBC 3.66 (L) 4.50 - 5.90 x10*6/uL    Hemoglobin " 11.8 (L) 13.5 - 17.5 g/dL    Hematocrit 35.1 (L) 41.0 - 52.0 %    MCV 96 80 - 100 fL    MCH 32.2 26.0 - 34.0 pg    MCHC 33.6 32.0 - 36.0 g/dL    RDW 13.7 11.5 - 14.5 %    Platelets 144 (L) 150 - 450 x10*3/uL   Basic metabolic panel   Result Value Ref Range    Glucose 87 74 - 99 mg/dL    Sodium 138 136 - 145 mmol/L    Potassium 3.6 3.5 - 5.3 mmol/L    Chloride 102 98 - 107 mmol/L    Bicarbonate 26 21 - 32 mmol/L    Anion Gap 14 10 - 20 mmol/L    Urea Nitrogen 21 6 - 23 mg/dL    Creatinine 0.64 0.50 - 1.30 mg/dL    eGFR >90 >60 mL/min/1.73m*2    Calcium 9.0 8.6 - 10.3 mg/dL   Urinalysis with Reflex Culture and Microscopic   Result Value Ref Range    Color, Urine Light-Yellow Light-Yellow, Yellow, Dark-Yellow    Appearance, Urine Clear Clear    Specific Gravity, Urine 1.022 1.005 - 1.035    pH, Urine 6.5 5.0, 5.5, 6.0, 6.5, 7.0, 7.5, 8.0    Protein, Urine NEGATIVE NEGATIVE, 10 (TRACE), 20 (TRACE) mg/dL    Glucose, Urine Normal Normal mg/dL    Blood, Urine NEGATIVE NEGATIVE mg/dL    Ketones, Urine 10 (1+) (A) NEGATIVE mg/dL    Bilirubin, Urine NEGATIVE NEGATIVE mg/dL    Urobilinogen, Urine Normal Normal mg/dL    Nitrite, Urine NEGATIVE NEGATIVE    Leukocyte Esterase, Urine NEGATIVE NEGATIVE   Extra Urine Gray Tube   Result Value Ref Range    Extra Tube 293          Patient fully evaluated 5/14,thorough record review performed of previous labs and notes from prior encounters. Plan discussed with interdisciplinary team, consults placed, appreciate input. Will continue current and repeat labs in the AM.        Discharge planning discussed with patient and care team. Therapy evaluations ordered. Patient aware and agreeable to current plan, continue plan as above.     I spent a total of 75 minutes on the date of the service which included preparing to see the patient, face-to-face patient care, completing clinical documentation, obtaining and/or reviewing separately obtained history, performing a medically appropriate  examination, counseling and educating the patient/family/caregiver, ordering medications, tests, or procedures, communicating with other HCPs (not separately reported), independently interpreting results (not separately reported), communicating results to the patient/family/caregiver, and care coordination (not separately reported).       Isi Velazquez         [1]   Past Medical History:  Diagnosis Date    Personal history of malignant neoplasm, unspecified     History of malignant neoplasm    Personal history of other diseases of the circulatory system     History of hypertension    Personal history of other diseases of the digestive system     History of diverticulitis    Personal history of other diseases of the digestive system     History of pancreatitis    Personal history of other diseases of the musculoskeletal system and connective tissue     History of arthritis    Personal history of other diseases of the musculoskeletal system and connective tissue     History of back pain    Personal history of other specified conditions     History of abdominal pain    Personal history of other specified conditions     History of headache   [2]   Past Surgical History:  Procedure Laterality Date    CATARACT EXTRACTION  09/11/2017    Cataract Surgery    CHOLECYSTECTOMY  09/11/2017    Cholecystectomy    COLONOSCOPY  09/11/2017    Colonoscopy (Fiberoptic)    LUMBAR FUSION      MR HEAD ANGIO WO IV CONTRAST  12/19/2023    MR HEAD ANGIO WO IV CONTRAST 12/19/2023 PAR MRI    MR NECK ANGIO WO IV CONTRAST  12/19/2023    MR NECK ANGIO WO IV CONTRAST 12/19/2023 PAR MRI    OTHER SURGICAL HISTORY  09/11/2017    Biopsy Skin   [3] No family history on file.

## 2025-05-14 NOTE — CONSULTS
Reason For Consult  Kidney stones    History Of Present Illness  Colton Jiménez is a 89 y.o. male presenting with past medical history of HTN, BPH, kidney stones, basal cell cancer on nose, lumbar radiculopathy s/p lumbar spinal fusion, cervical spondylosis with myelopathy, who presented to [facility] ED today with acute exacerbation of back pain. He states he was trying to unload his truck from the grocery store, had several bags in his left hand and a large bottle of detergent in the right hand and when he stood up he felt pain. He is unable to ambulate. He denies any flank pain, hematuria or dysuria. He is followed by Dr Rey for his stones.      Past Medical History  He has a past medical history of Personal history of malignant neoplasm, unspecified, Personal history of other diseases of the circulatory system, Personal history of other diseases of the digestive system, Personal history of other diseases of the digestive system, Personal history of other diseases of the musculoskeletal system and connective tissue, Personal history of other diseases of the musculoskeletal system and connective tissue, Personal history of other specified conditions, and Personal history of other specified conditions.    Surgical History  He has a past surgical history that includes Other surgical history (09/11/2017); Colonoscopy (09/11/2017); Cholecystectomy (09/11/2017); Cataract extraction (09/11/2017); MR angio neck wo IV contrast (12/19/2023); MR angio head wo IV contrast (12/19/2023); and Lumbar fusion.     Social History  He reports that he has never smoked. He has never been exposed to tobacco smoke. He has never used smokeless tobacco. He reports current alcohol use. He reports that he does not use drugs.    Family History  Family History[1]     Allergies  Patient has no known allergies.    Review of Systems  I reviewed the H&P and there is no changes     Physical Exam  A&Ox3 no acute distress.  No CVAT     Last Recorded  "Vitals  Blood pressure 152/74, pulse 56, temperature 36.5 °C (97.7 °F), resp. rate 19, height 1.803 m (5' 11\"), weight 64.4 kg (142 lb), SpO2 98%.    Relevant Results      .CT lumbar spine wo IV contrast  Result Date: 5/13/2025  Interpreted By:  Jack Abdi, STUDY: CT LUMBAR SPINE WO IV CONTRAST;  5/13/2025 7:09 pm   INDICATION: Signs/Symptoms:back pain.     COMPARISON: None.   ACCESSION NUMBER(S): [id number]   ORDERING CLINICIAN: CAMRON SCHULTZ   TECHNIQUE: Axial noncontrast images of the lumbar spine with coronal and sagittal reconstructed images.   FINDINGS: VISUALIZED ABDOMEN: There are numerous nonobstructing bilateral renal calculi measuring up to 0.8 cm in the right kidney and 0.6 cm the left kidney. There is new 0.4 cm calculus in the distal right ureter prominent intrahepatic and extrahepatic bile ducts are likely secondary to post-cholecystectomy status. Moderate aortic atherosclerosis without AAA. There is colonic diverticulosis.   PARASPINAL SOFT TISSUES: No significant abnormality.   ALIGNMENT: Grade 1 retrolisthesis of L3 on L4 and anterolisthesis of L5 on S1 (unchanged). No traumatic spondylolisthesis or traumatic facet widening.   POSTOPERATIVE CHANGES: Redemonstrated L4-S1 posterior fusion and L4-L5 and L5 S S1 discectomies. There was solid ankylosis across the anterior and posterior elements. The hardware is intact without periprosthetic lucency or fracture.   VERTEBRAE: Not repeated bone. 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 There is ossification in the interspinous spaces at L4-L5 and L5-S1 similar to prior study. No acute fractures. Vertebral body heights are maintained.   SPINAL CANAL/INTERVERTEBRAL DISCS: There is no high-grade canal stenosis. There are disc spur complexes at L2-L3 and L3-L4 which contribute to mild central canal stenosis in conjunction with ligamentum flavum thickening and which appears similar to " 12/18/2023.   NEURAL FORAMINA: There is multilevel hypertrophic facet arthropathy of mild degree at L1-L2 and L2-L3, moderate at L3-L4. This contributes to varying degrees of neural foraminal stenosis in conjunction with disc disease and is notably most advanced at L3-L4 with moderate bilateral neural foraminal stenosis, also similar to prior study.   SACROILIAC JOINTS: Intact.       New 0.4 cm calculus in the distal right ureter without hydronephrosis. There are numerous nonobstructing bilateral renal calculi that remain.   Multilevel spondylotic changes of the lumbar spine which are similar to prior study as described above and are most pronounced at L3-L4 and L2-L3 with mild canal stenosis in up to moderate bilateral foraminal stenosis at L3-L4.   Status post L4-S1 posterior fusion without evidence of hardware complication.   MACRO: None.   Signed by: Jack Abdi 5/13/2025 7:34 PM Dictation workstation:   [id number]        Assessment/Plan     89 yr old male with a right UVJ 4mm calculus with no hydronephrosis and renal calculus.  - Medical expulsive therapy; increase fluids continue Flomax and F/U in 1 week with Dr Rey with [facility] before the appointment    Thank you for allowing us to participate in his care    I spent 30 minutes in the professional and overall care of this patient.      Giorgi Recio PA-C         [1] No family history on file.

## 2025-05-14 NOTE — PROGRESS NOTES
Occupational Therapy    Evaluation    Patient Name: Colton Jiménez  MRN: [id number]  Department: [facility]  Room: [address] [place] [address]  Today's Date: 5/14/2025  Time Calculation  Start Time: 0954  Stop Time: 1017  Time Calculation (min): 23 min        Assessment:  End of Session Communication: Bedside nurse  End of Session Patient Position: Up in chair, Alarm on (call light in reach, all needs met)  OT Assessment Results: Decreased ADL status, Decreased upper extremity strength, Decreased endurance, Decreased functional mobility  Strengths: Living arrangement secure  Barriers to Participation: Comorbidities  Plan:  Treatment Interventions: ADL retraining, UE strengthening/ROM, Functional transfer training, Endurance training, Equipment evaluation/education, Compensatory technique education  OT Frequency: 3 times per week  OT Discharge Recommendations: Moderate intensity level of continued care  OT - OK to Discharge: Yes (once medically appropriate to next level of care)  Treatment Interventions: ADL retraining, UE strengthening/ROM, Functional transfer training, Endurance training, Equipment evaluation/education, Compensatory technique education    Subjective   Current Problem:  1. Lumbar strain, initial encounter          OT Visit Info:  OT Received On: 05/14/25  General:  General  Reason for Referral: OT eval and tx; ADLs. dx; Lumbar strain,  Ambulatory dysfunction. CT L-spine: New 0.4 cm calculus in the distal right ureter without  hydronephrosis. There are numerous nonobstructing bilateral renal  calculi that remain.      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      Status post L4-S1 posterior fusion without evidence of hardware  complication  Referred By: Teresa  Past Medical History Relevant to Rehab: 89 y.o. male with a past medical history  of HTN, BPH, kidney stones, basal cell cancer on nose, lumbar radiculopathy s/p lumbar spinal fusion, cervical spondylosis with myelopathy, who presented to [facility] ED today with acute exacerbation of back pain. He states he was trying to unload his truck from the grocery store, had several bags in his left hand and a large bottle of detergent in the right hand and when he stood up he felt pain. He is unable to ambulate. He states his daughter came and took a bag. They called 911 because the patient was unable to ambulate. Patient does have history of back surgery in the past. He currently sees pain management. He states his back pain has been hurting him for about a week but is worse today. He tried to call pain management twice today with no response. He states he is only on Tylenol at home but he does get injections periodically. Last injection was in April. No loss of bladder or bowel control. No saddle anesthesia. No direct trauma. Denies urinary symptoms. Denies fever, chills, chest pain, shortness of breath, abdominal pain, nausea, vomiting, diarrhea, or constipation.  Co-Treatment: PT  Co-Treatment Reason: for safety and to maximize therapeutic performance  Prior to Session Communication: Bedside nurse  Patient Position Received: Bed, 2 rail up, Alarm on  General Comment: patient pleasant and cooperative; requires min encouragement to participate. also limited by pain and dizziness  Precautions:  Medical Precautions: Fall precautions (alarms, external catheter, up in chair, ambulate,)            Pain:  Pain Assessment  Pain Assessment:  (reports 5/10 pain in back, right side; RN medicating patient during session, also repositioned EOS for comfort)    Objective   Cognition:  Overall Cognitive Status: Within Functional Limits           Home Living:  Type of Home:  (patient lives with spouse, 3 [address] house without HR. . patient has 1st floor set up. spouse is bedbound, patient is her caregiver, he provides total  care.)  Bathroom Shower/Tub:  (Walk in shower, no DME)  Bathroom Toilet:  (standard toilet, no DME)  Prior Function:  Level of [place]:  (independent in ADLs/IADLs PLOF. drives. no AD use. owns WW. reports also has 2 supportive daughters)       ADL:  ADL Comments: anticipate MOD A x 1-2 for all ADLs based on performance with transfers/mobility this date  Activity Tolerance:  Endurance: Decreased tolerance for upright activites  Bed Mobility/Transfers: Bed Mobility  Bed Mobility:  (completed supine to sit with MOD A x 2; requires max cues for log roll technique)    Transfers  Transfer:  (completed STS with MOD A x 1 with WW)      Functional Mobility:  Functional Mobility  Functional Mobility Performed:  (engaged in stand step transfer from EOB to recliner chair with WW and MOD A x 1; patient reports dizziness, symptoms resolved when seated)     Sensation:  Sensation Comment: reports chronic numbness/tingling in B  feet/hands due to neuropathy  Strength:  Strength Comments: BUE ROM/MMT WFL for patient age as seen through transfers/mobility this date      Outcome Measures:[facility] Daily Activity  Putting on and taking off regular lower body clothing: Total  Bathing (including washing, rinsing, drying): Total  Putting on and taking off regular upper body clothing: A lot  Toileting, which includes using toilet, bedpan or urinal: Total  Taking care of personal grooming such as brushing teeth: A little  Eating Meals: None  Daily Activity - Total Score: 12        Education Documentation  ADL Training, taught by Alis Ramsey OT at 5/14/2025 12:00 PM.  Learner: Patient  Readiness: Acceptance  Method: Explanation  Response: Verbalizes Understanding, Needs Reinforcement    Body Mechanics, taught by Alis Ramsey OT at 5/14/2025 12:00 PM.  Learner: Patient  Readiness: Acceptance  Method: Explanation  Response: Verbalizes Understanding, Needs Reinforcement    Precautions, taught by Alis Ramsey OT at 5/14/2025 12:00  PM.  Learner: Patient  Readiness: Acceptance  Method: Explanation  Response: Verbalizes Understanding, Needs Reinforcement    Education Comments  No comments found.        OP EDUCATION:       Goals:  Encounter Problems       Encounter Problems (Active)       OT Goals       STG- patient will complete LB dressing with MIN A with use of ae/ad/dme prn (Progressing)       Start:  05/14/25    Expected End:  05/28/25            STG- patient will complete toileting at MIN A with use of ae/ad/dme prn (Progressing)       Start:  05/14/25    Expected End:  05/28/25            STG- patient will complete transfers to/from bed, chair, commode at CGA with use of ae/ad/dme prn  (Progressing)       Start:  05/14/25    Expected End:  05/28/25            STG- patient will complete simple mobility at CGA with use of ae/ad/dme prn (Progressing)       Start:  05/14/25    Expected End:  05/28/25            STG- patient will complete grooming at supervision with use of ae/ad/dme prn (Progressing)       Start:  05/14/25    Expected End:  05/28/25

## 2025-05-14 NOTE — PROGRESS NOTES
Physical Therapy    Physical Therapy Evaluation    Patient Name: Colton Jiménez  MRN: [id number]  Today's Date: 5/14/2025   Time Calculation  Start Time: 0954  Stop Time: 1017  Time Calculation (min): 23 min  635/635-A    Assessment/Plan   PT Assessment  PT Assessment Results: Decreased strength, Decreased endurance, Impaired balance, Decreased mobility, Decreased safety awareness, Pain  Rehab Prognosis: Good  Barriers to Discharge Home: Caregiver assistance, Physical needs  Caregiver Assistance: Caregiver assistance needed per identified barriers - however, level of patient's required assistance exceeds assistance available at home  Physical Needs: Ambulating household distances limited by function/safety  Evaluation/Treatment Tolerance: Patient limited by pain (dizziness)  End of Session Communication: Bedside nurse  Assessment Comment: Continued skilled PT intervention indicated to facilitate increased strength, balance & gait stability  End of Session Patient Position: Up in chair, Alarm on (le elevated, call light in reach)  IP OR SWING BED PT PLAN  Inpatient or Swing Bed: Inpatient  PT Plan  Treatment/Interventions: Bed mobility, Transfer training, Gait training, Stair training, Therapeutic exercise, Therapeutic activity  PT Plan: Ongoing PT  PT Frequency: 4 times per week  PT Discharge Recommendations: Moderate intensity level of continued care  PT Recommended Transfer Status: Assist x1  PT - OK to Discharge: Yes (to next level of care when cleared by medical team)    Subjective     Current Problem:  1. Lumbar strain, initial encounter          Problem List[1]  past medical history of HTN, BPH, kidney stones, basal cell cancer on nose, lumbar radiculopathy s/p lumbar spinal fusion, cervical spondylosis with myelopathy   General Visit Information:  General  Reason for Referral: PT eval & treat/impaired mobility DX: lumbar strain, ambulatory dysfunction; 5/13/25 acute exacerbation of back pain x1wk, unable to  ambulate; imaging: Multilevel spondylotic changes of the lumbar spine which are similar to prior study as described above and are most pronounced at L3-L4 and L2-L3 with mild canal stenosis in up to moderate bilateral foraminal stenosis at L3-L4.   Status post L4-S1 posterior fusion without evidence of hardware complication.  Referred By: Teresa  Caregiver Feedback: Per conference w/ RN patient stable to participate in therapy  Co-Treatment: OT  Co-Treatment Reason: to maximize pt safety & mobility  Patient Position Received: Bed, 2 rail up, Alarm on  General Comment: Pleasant & cooperative, receptive to mobility& instructions; pain limited mobility    Home Living:  Home Living  Home Living Comments: lives w/ spouse who is bedbound/pt is her caregiver; daughters local & supportive; 3steps w/o rail to enter [facility] setup ; walk in shower; standard ht toilet    Prior Level of Function:  Prior Function Per Pt/Caregiver Report  Prior Function Comments: independent mobility denies h/o falls; owns ww; independent adl/iadl/driving    Precautions:  Precautions  Precautions Comment: fall, alarm, [place], [place]    Vital Signs:     Objective     Pain:  Pain Assessment  Pain Assessment:  (rt sided LBP rated 5/10, RN medicated during session)    Cognition:  Cognition  Orientation Level: Oriented X4, Unable to assess    General Assessments:      Activity Tolerance  Endurance: Decreased tolerance for upright activites  Sensation  Sensation Comment: chronic numbness/tingling hands & feet 2/2 neuropathy     Functional Assessments:     Bed Mobility  Bed Mobility:  (instructed in log rolling , mod assist x2 supine>sit, guarded/pain limited)  Transfers  Transfer:  (mod assist x1 sit>stand elevated bed>ww, cues for safe hand plcmt, difficulty w/forward wt shift & le extension)  Ambulation/Gait Training  Ambulation/Gait Training Performed:  (mod assist x1 w/ ww 3ft pivot steps bed>chair; flexed posture, short shuffling steps, unsteady w/  dizziness limiting further standing activity)     Extremity/Trunk Assessments:        RLE   RLE :  (guarded, in supine attains hip/knee flexion to ~40deg sitting wfl; strength grossly 3+/5 w/pain limit)  LLE   LLE :  (arom grossly wfl; strength grossly 3+/5 w/pain limit)    Outcome Measures:     [facility] Basic Mobility  Turning from your back to your side while in a flat bed without using bedrails: A lot  Moving from lying on your back to sitting on the side of a flat bed without using bedrails: A lot  Moving to and from bed to chair (including a wheelchair): A lot  Standing up from a chair using your arms (e.g. wheelchair or bedside chair): A lot  To walk in hospital room: Total  Climbing 3-5 steps with railing: Total  Basic Mobility - Total Score: 10   Goals:  Encounter Problems       Encounter Problems (Active)       PT Problem       STG - Pt will transition supine <> sitting with min assist x1 using log rolling technique (Progressing)       Start:  05/14/25    Expected End:  05/28/25            STG - Pt will transfer STS with cga  (Progressing)       Start:  05/14/25    Expected End:  05/28/25            STG - Pt will amb >=40' using ww with cga  (Progressing)       Start:  05/14/25    Expected End:  05/28/25            STG - Pt will perform 2-3 sets of BLE therex x10 to maximize functional strength and independence  (Progressing)       Start:  05/14/25    Expected End:  05/28/25            STG - Pt will maintain standing supported balance with no LOB noted   (Progressing)       Start:  05/14/25    Expected End:  05/28/25                 Education Documentation  Mobility Training, taught by Ondina Aguilar PT at 5/14/2025 11:51 AM.  Learner: Patient  Readiness: Acceptance  Method: Explanation  Response: Verbalizes Understanding  Comment: safety, activity progression, body mechanics for back protection & comfort, use of ww                 [1]   Patient Active Problem List  Diagnosis    Fall, initial encounter     Myelomalacia of cervical cord    Cervical spondylosis with myelopathy    History of lumbar spinal fusion    Lumbar strain, initial encounter

## 2025-05-14 NOTE — ED PROVIDER NOTES
Patient was admitted to the hospitalist team and the hospitalist team called me and stated that the patient is requesting to be admitted to the service of Dr. Moore.  I spoke to Dr. Moore  who accepted admission and switched to the admission under his care.     Kenroy Caba,   05/13/25 [id number]

## 2025-05-14 NOTE — H&P
History Of Present Illness  Colton Jiménez is a 89 y.o. male with a past medical history of HTN, BPH, kidney stones, basal cell cancer on nose, lumbar radiculopathy s/p lumbar spinal fusion, cervical spondylosis with myelopathy, who presented to [facility] ED today with acute exacerbation of back pain. He states he was trying to unload his truck from the grocery store, had several bags in his left hand and a large bottle of detergent in the right hand and when he stood up he felt pain. He is unable to ambulate. He states his daughter came and took a bag. They called 911 because the patient was unable to ambulate. Patient does have history of back surgery in the past. He currently sees pain management. He states his back pain has been hurting him for about a week but is worse today. He tried to call pain management twice today with no response. He states he is only on Tylenol at home but he does get injections periodically. Last injection was in April. No loss of bladder or bowel control. No saddle anesthesia. No direct trauma. Denies urinary symptoms. Denies fever, chills, chest pain, shortness of breath, abdominal pain, nausea, vomiting, diarrhea, or constipation.     ED course: Hemodynamically stable. Temp 36.8C, /91, HR 75, RR 20, 100% RA. EKG unavailable for my review. Labs: WBC 3.8. RBC 3.66, H&H 11.8/35.1, platelets 144. UA: 1+ketones. See imaging results below. Morphine and zofran given in ED. Pt is being admitted to Dr. Moore who will continue to follow. I was asked to do the H&P and initial assessment.      Past Medical History  Medical History[1]  As above  Surgical History  Surgical History[2]     Social History  He reports that he has never smoked. He has never been exposed to tobacco smoke. He has never used smokeless tobacco. He reports current alcohol use. He reports that he does not use drugs.    Family History  Family History[3]     Allergies  Patient has no known allergies.    Review of Systems  10  "point review of symptoms was performed and is negative except for what is stated in the HPI above.    Physical Exam  Constitutional:       Appearance: Normal appearance.      Comments: Pleasant elderly male resting in bed in discomfort   HENT:      Head: Normocephalic and atraumatic.      Nose: Nose normal.      Mouth/Throat:      Mouth: Mucous membranes are moist.      Pharynx: Oropharynx is clear.   Eyes:      Extraocular Movements: Extraocular movements intact.      Conjunctiva/sclera: Conjunctivae normal.      Pupils: Pupils are equal, round, and reactive to light.   Cardiovascular:      Rate and Rhythm: Normal rate and regular rhythm.      Pulses: Normal pulses.      Heart sounds: Normal heart sounds.   Pulmonary:      Effort: Pulmonary effort is normal.      Breath sounds: Normal breath sounds.   Abdominal:      General: Abdomen is flat. Bowel sounds are normal.      Palpations: Abdomen is soft.   Musculoskeletal:         General: Normal range of motion.      Cervical back: Normal range of motion and neck supple.      Comments: Right paraspinal lumbar tenderness, limited ROM due to pain. NV intact in lower extremities.   Skin:     General: Skin is warm and dry.      Capillary Refill: Capillary refill takes less than 2 seconds.   Neurological:      General: No focal deficit present.      Mental Status: He is alert and oriented to person, place, and time.   Psychiatric:         Mood and Affect: Mood normal.         Behavior: Behavior normal.         Thought Content: Thought content normal.         Judgment: Judgment normal.          Last Recorded Vitals  Blood pressure (!) 177/91, pulse 75, temperature 36.8 °C (98.2 °F), temperature source Skin, resp. rate 20, height 1.803 m (5' 11\"), weight 64.4 kg (142 lb), SpO2 100%.    Relevant Results  Results for orders placed or performed during the hospital encounter of 05/13/25 (from the past 24 hours)   CBC   Result Value Ref Range    WBC 3.8 (L) 4.4 - 11.3 x10*3/uL    " nRBC 0.0 0.0 - 0.0 /100 WBCs    RBC 3.66 (L) 4.50 - 5.90 x10*6/uL    Hemoglobin 11.8 (L) 13.5 - 17.5 g/dL    Hematocrit 35.1 (L) 41.0 - 52.0 %    MCV 96 80 - 100 fL    MCH 32.2 26.0 - 34.0 pg    MCHC 33.6 32.0 - 36.0 g/dL    RDW 13.7 11.5 - 14.5 %    Platelets 144 (L) 150 - 450 x10*3/uL   Basic metabolic panel   Result Value Ref Range    Glucose 87 74 - 99 mg/dL    Sodium 138 136 - 145 mmol/L    Potassium 3.6 3.5 - 5.3 mmol/L    Chloride 102 98 - 107 mmol/L    Bicarbonate 26 21 - 32 mmol/L    Anion Gap 14 10 - 20 mmol/L    Urea Nitrogen 21 6 - 23 mg/dL    Creatinine 0.64 0.50 - 1.30 mg/dL    eGFR >90 >60 mL/min/1.73m*2    Calcium 9.0 8.6 - 10.3 mg/dL   Urinalysis with Reflex Culture and Microscopic   Result Value Ref Range    Color, Urine Light-Yellow Light-Yellow, Yellow, Dark-Yellow    Appearance, Urine Clear Clear    Specific Gravity, Urine 1.022 1.005 - 1.035    pH, Urine 6.5 5.0, 5.5, 6.0, 6.5, 7.0, 7.5, 8.0    Protein, Urine NEGATIVE NEGATIVE, 10 (TRACE), 20 (TRACE) mg/dL    Glucose, Urine Normal Normal mg/dL    Blood, Urine NEGATIVE NEGATIVE mg/dL    Ketones, Urine 10 (1+) (A) NEGATIVE mg/dL    Bilirubin, Urine NEGATIVE NEGATIVE mg/dL    Urobilinogen, Urine Normal Normal mg/dL    Nitrite, Urine NEGATIVE NEGATIVE    Leukocyte Esterase, Urine NEGATIVE NEGATIVE     CT lumbar spine wo IV contrast  Result Date: 5/13/2025  Interpreted By:  Jack Abdi, STUDY: CT LUMBAR SPINE WO IV CONTRAST;  5/13/2025 7:09 pm   INDICATION: Signs/Symptoms:back pain.     COMPARISON: None.   ACCESSION NUMBER(S): [id number]   ORDERING CLINICIAN: CAMRON SCHULTZ   TECHNIQUE: Axial noncontrast images of the lumbar spine with coronal and sagittal reconstructed images.   FINDINGS: VISUALIZED ABDOMEN: There are numerous nonobstructing bilateral renal calculi measuring up to 0.8 cm in the right kidney and 0.6 cm the left kidney. There is new 0.4 cm calculus in the distal right ureter prominent intrahepatic and extrahepatic bile ducts  are likely secondary to post-cholecystectomy status. Moderate aortic atherosclerosis without AAA. There is colonic diverticulosis.   PARASPINAL SOFT TISSUES: No significant abnormality.   ALIGNMENT: Grade 1 retrolisthesis of L3 on L4 and anterolisthesis of L5 on S1 (unchanged). No traumatic spondylolisthesis or traumatic facet widening.   POSTOPERATIVE CHANGES: Redemonstrated L4-S1 posterior fusion and L4-L5 and L5 S S1 discectomies. There was solid ankylosis across the anterior and posterior elements. The hardware is intact without periprosthetic lucency or fracture.   VERTEBRAE: Not repeated bone. 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 There is ossification in the interspinous spaces at L4-L5 and L5-S1 similar to prior study. No acute fractures. Vertebral body heights are maintained.   SPINAL CANAL/INTERVERTEBRAL DISCS: There is no high-grade canal stenosis. There are disc spur complexes at L2-L3 and L3-L4 which contribute to mild central canal stenosis in conjunction with ligamentum flavum thickening and which appears similar to 12/18/2023.   NEURAL FORAMINA: There is multilevel hypertrophic facet arthropathy of mild degree at L1-L2 and L2-L3, moderate at L3-L4. This contributes to varying degrees of neural foraminal stenosis in conjunction with disc disease and is notably most advanced at L3-L4 with moderate bilateral neural foraminal stenosis, also similar to prior study.   SACROILIAC JOINTS: Intact.       New 0.4 cm calculus in the distal right ureter without hydronephrosis. There are numerous nonobstructing bilateral renal calculi that remain.   Multilevel spondylotic changes of the lumbar spine which are similar to prior study as described above and are most pronounced at L3-L4 and L2-L3 with mild canal stenosis in up to moderate bilateral foraminal stenosis at L3-L4.   Status post L4-S1 posterior fusion without evidence of hardware complication.    MACRO: None.   Signed by: Jack Abdi 5/13/2025 7:34 PM Dictation workstation:   [id number]   Assessment & Plan  Lumbar strain, initial encounter    89 year old male who presented to ED today with acute exacerbation of back pain and unable to ambulate. PT/OT/SW ordered. Patient will be hospitalized for further medical management.      Lumbar strain  Ambulatory dysfunction  Hx of chronic back pain in pain management  Admit to OBS/telemety to Dr. Moore   Defer consults to attending per request  See imaging results   Analgesics/Zofran PRN   PT/OT/SW  Repeat labs in AM     Pancytopenia, chronic  WBC 3.8, H&H 11.8/35.1, platelets 144  Continue to monitor     Chronic conditions   #HTN, BPH, kidney stones, basal cell carcinoma  Continue home medications as appropriate when nursing completes home med rec   Full code      DVT PPX   Ambulation   SCD's as tolerated     I spent 40 minutes in the professional and overall care of this patient.    Jesenia Wilils, APRN-CNP         [1]   Past Medical History:  Diagnosis Date    Personal history of malignant neoplasm, unspecified     History of malignant neoplasm    Personal history of other diseases of the circulatory system     History of hypertension    Personal history of other diseases of the digestive system     History of diverticulitis    Personal history of other diseases of the digestive system     History of pancreatitis    Personal history of other diseases of the musculoskeletal system and connective tissue     History of arthritis    Personal history of other diseases of the musculoskeletal system and connective tissue     History of back pain    Personal history of other specified conditions     History of abdominal pain    Personal history of other specified conditions     History of headache   [2]   Past Surgical History:  Procedure Laterality Date    CATARACT EXTRACTION  09/11/2017    Cataract Surgery    CHOLECYSTECTOMY  09/11/2017    Cholecystectomy     COLONOSCOPY  09/11/2017    Colonoscopy (Fiberoptic)    LUMBAR FUSION      MR HEAD ANGIO WO IV CONTRAST  12/19/2023    MR HEAD ANGIO WO IV CONTRAST 12/19/2023 [facility]    MR NECK ANGIO WO IV CONTRAST  12/19/2023    MR NECK ANGIO WO IV CONTRAST 12/19/2023 [facility]    OTHER SURGICAL HISTORY  09/11/2017    Biopsy Skin   [3] No family history on file.

## 2025-05-14 NOTE — PROGRESS NOTES
05/14/25 9074   Discharge Planning   Living Arrangements Spouse/significant other   Support Systems Children;Home care staff   Expected Discharge Disposition Home H     I met with patient at the bedside, verified insurance and demographics.  Patient lives with his wife who is total care, bedbound; he is her caregiver.  They have 2 daughters who live in the area and assist with caregiving for patient's wife when he has to leave the home for shopping, errands, appointments.  Of note, his wife is currently a patient on the 9th floor.  He does not use mobility aids to ambulate, denies falls, drives and is independent with all ADLs at baseline.  Patient reported chronic back pain, gets spinal injections at the pain management clinic.  His AMPAC score is 10/12 but is refusing SNF, wants pain management to give his spinal injections bedside.  Attending explained to patient that the injections are done outpatient only and he verbalized understanding.  Patient is agreeable to home health care, reported they are already active with [facility] for his wife's care; referral sent to [facility].  Care Coordination team following for assistance with discharge planning.  Candice SANDERS TCC

## 2025-05-14 NOTE — PROGRESS NOTES
Colton Jiménez is a 89 y.o. male on day 0 of admission presenting with Lumbar strain, initial encounter.      Subjective   Patient fully evaluated    for    Problem List Items Addressed This Visit       * (Principal) Lumbar strain, initial encounter - Primary     Patient seen resting in bed with head of bed elevated, no s/s or c/o acute difficulties at this time.  Vital signs for last 24 hours Temp:  [36.4 °C (97.5 °F)-36.8 °C (98.2 °F)] 36.4 °C (97.5 °F)  Heart Rate:  [56-75] 61  Resp:  [19-20] 20  BP: (107-177)/(56-91) 107/56    No intake/output data recorded.  Patient still requiring frequent cardiac and SPO2 monitoring. Continue aggressive pulmonary hygiene and oral hygiene. Off loading as tolerated for skin integrity. Medications and labs reviewed-   Results for orders placed or performed during the hospital encounter of 05/13/25 (from the past 24 hours)   CBC   Result Value Ref Range    WBC 3.8 (L) 4.4 - 11.3 x10*3/uL    nRBC 0.0 0.0 - 0.0 /100 WBCs    RBC 3.66 (L) 4.50 - 5.90 x10*6/uL    Hemoglobin 11.8 (L) 13.5 - 17.5 g/dL    Hematocrit 35.1 (L) 41.0 - 52.0 %    MCV 96 80 - 100 fL    MCH 32.2 26.0 - 34.0 pg    MCHC 33.6 32.0 - 36.0 g/dL    RDW 13.7 11.5 - 14.5 %    Platelets 144 (L) 150 - 450 x10*3/uL   Basic metabolic panel   Result Value Ref Range    Glucose 87 74 - 99 mg/dL    Sodium 138 136 - 145 mmol/L    Potassium 3.6 3.5 - 5.3 mmol/L    Chloride 102 98 - 107 mmol/L    Bicarbonate 26 21 - 32 mmol/L    Anion Gap 14 10 - 20 mmol/L    Urea Nitrogen 21 6 - 23 mg/dL    Creatinine 0.64 0.50 - 1.30 mg/dL    eGFR >90 >60 mL/min/1.73m*2    Calcium 9.0 8.6 - 10.3 mg/dL   Urinalysis with Reflex Culture and Microscopic   Result Value Ref Range    Color, Urine Light-Yellow Light-Yellow, Yellow, Dark-Yellow    Appearance, Urine Clear Clear    Specific Gravity, Urine 1.022 1.005 - 1.035    pH, Urine 6.5 5.0, 5.5, 6.0, 6.5, 7.0, 7.5, 8.0    Protein, Urine NEGATIVE NEGATIVE, 10 (TRACE), 20 (TRACE) mg/dL    Glucose, Urine  Normal Normal mg/dL    Blood, Urine NEGATIVE NEGATIVE mg/dL    Ketones, Urine 10 (1+) (A) NEGATIVE mg/dL    Bilirubin, Urine NEGATIVE NEGATIVE mg/dL    Urobilinogen, Urine Normal Normal mg/dL    Nitrite, Urine NEGATIVE NEGATIVE    Leukocyte Esterase, Urine NEGATIVE NEGATIVE   Extra Urine Gray Tube   Result Value Ref Range    Extra Tube 293       Patient recently received an antibiotic (last 12 hours)       None           Plan discussed with interdisciplinary team, continue current and repeat labs in the AM.     Discharge planning discussed with patient and care team. Therapy evaluations ordered. Anticipate HHC/SNF at discharge. Patient aware and agreeable to current plan, continue plan as above.     I spent a total of 60 minutes on the date of the service which included preparing to see the patient, face-to-face patient care, completing clinical documentation, obtaining and/or reviewing separately obtained history, performing a medically appropriate examination, counseling and educating the patient/family/caregiver, ordering medications, tests, or procedures, communicating with other HCPs (not separately reported), independently interpreting results (not separately reported), communicating results to the patient/family/caregiver, and care coordination (not separately reported).        Objective     Last Recorded Vitals  /56 (BP Location: Right arm, Patient Position: Sitting)   Pulse 61   Temp 36.4 °C (97.5 °F) (Temporal)   Resp 20   Wt 64.4 kg (142 lb)   SpO2 98%   Intake/Output last 3 Shifts:  No intake or output data in the 24 hours ending 05/14/25 1500    Admission Weight  Weight: 64.4 kg (142 lb) (05/13/25 1810)    Daily Weight  05/13/25 : 64.4 kg (142 lb)    Image Results  CT lumbar spine wo IV contrast  Narrative: Interpreted By:  Jack Abdi,   STUDY:  CT LUMBAR SPINE WO IV CONTRAST;  5/13/2025 7:09 pm      INDICATION:  Signs/Symptoms:back pain.          COMPARISON:  None.      ACCESSION  NUMBER(S):  [id number]      ORDERING CLINICIAN:  CAMRON SCHULTZ      TECHNIQUE:  Axial noncontrast images of the lumbar spine with coronal and  sagittal reconstructed images.      FINDINGS:  VISUALIZED ABDOMEN: There are numerous nonobstructing bilateral renal  calculi measuring up to 0.8 cm in the right kidney and 0.6 cm the  left kidney. There is new 0.4 cm calculus in the distal right ureter  prominent intrahepatic and extrahepatic bile ducts are likely  secondary to post-cholecystectomy status. Moderate aortic  atherosclerosis without AAA. There is colonic diverticulosis.      PARASPINAL SOFT TISSUES: No significant abnormality.      ALIGNMENT: Grade 1 retrolisthesis of L3 on L4 and anterolisthesis of  L5 on S1 (unchanged). No traumatic spondylolisthesis or traumatic  facet widening.      POSTOPERATIVE CHANGES: Redemonstrated L4-S1 posterior fusion and  L4-L5 and L5 S S1 discectomies. There was solid ankylosis across the  anterior and posterior elements. The hardware is intact without  periprosthetic lucency or fracture.      VERTEBRAE: Not repeated bone. 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  There is ossification in the interspinous spaces at L4-L5 and L5-S1  similar to prior study. No acute fractures. Vertebral body heights  are maintained.      SPINAL CANAL/INTERVERTEBRAL DISCS: There is no high-grade canal  stenosis. There are disc spur complexes at L2-L3 and L3-L4 which  contribute to mild central canal stenosis in conjunction with  ligamentum flavum thickening and which appears similar to 12/18/2023.      NEURAL FORAMINA: There is multilevel hypertrophic facet arthropathy  of mild degree at L1-L2 and L2-L3, moderate at L3-L4. This  contributes to varying degrees of neural foraminal stenosis in  conjunction with disc disease and is notably most advanced at L3-L4  with moderate bilateral neural foraminal stenosis, also similar to  prior  study.      SACROILIAC JOINTS: Intact.      Impression: New 0.4 cm calculus in the distal right ureter without  hydronephrosis. There are numerous nonobstructing bilateral renal  calculi that remain.      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      Status post L4-S1 posterior fusion without evidence of hardware  complication.      MACRO:  None.      Signed by: Jack Abdi 5/13/2025 7:34 PM  Dictation workstation:   [id number]      Physical Exam    Relevant Results                  Assessment & Plan  Lumbar strain, initial encounter    Patient fully evaluated  05/14  for    Problem List Items Addressed This Visit       * (Principal) Lumbar strain, initial encounter - Primary     Patient seen resting in bed with head of bed elevated, no s/s or c/o acute difficulties at this time.  Vital signs for last 24 hours Temp:  [36.4 °C (97.5 °F)-36.8 °C (98.2 °F)] 36.4 °C (97.5 °F)  Heart Rate:  [56-75] 61  Resp:  [19-20] 20  BP: (107-177)/(56-91) 107/56    No intake/output data recorded.  Patient still requiring frequent cardiac and SPO2 monitoring. Continue aggressive pulmonary hygiene and oral hygiene. Off loading as tolerated for skin integrity. Medications and labs reviewed-   Results for orders placed or performed during the hospital encounter of 05/13/25 (from the past 24 hours)   CBC   Result Value Ref Range    WBC 3.8 (L) 4.4 - 11.3 x10*3/uL    nRBC 0.0 0.0 - 0.0 /100 WBCs    RBC 3.66 (L) 4.50 - 5.90 x10*6/uL    Hemoglobin 11.8 (L) 13.5 - 17.5 g/dL    Hematocrit 35.1 (L) 41.0 - 52.0 %    MCV 96 80 - 100 fL    MCH 32.2 26.0 - 34.0 pg    MCHC 33.6 32.0 - 36.0 g/dL    RDW 13.7 11.5 - 14.5 %    Platelets 144 (L) 150 - 450 x10*3/uL   Basic metabolic panel   Result Value Ref Range    Glucose 87 74 - 99 mg/dL    Sodium 138 136 -  145 mmol/L    Potassium 3.6 3.5 - 5.3 mmol/L    Chloride 102 98 - 107 mmol/L    Bicarbonate 26 21 - 32 mmol/L    Anion Gap 14 10 - 20 mmol/L    Urea Nitrogen 21 6 - 23 mg/dL    Creatinine 0.64 0.50 - 1.30 mg/dL    eGFR >90 >60 mL/min/1.73m*2    Calcium 9.0 8.6 - 10.3 mg/dL   Urinalysis with Reflex Culture and Microscopic   Result Value Ref Range    Color, Urine Light-Yellow Light-Yellow, Yellow, Dark-Yellow    Appearance, Urine Clear Clear    Specific Gravity, Urine 1.022 1.005 - 1.035    pH, Urine 6.5 5.0, 5.5, 6.0, 6.5, 7.0, 7.5, 8.0    Protein, Urine NEGATIVE NEGATIVE, 10 (TRACE), 20 (TRACE) mg/dL    Glucose, Urine Normal Normal mg/dL    Blood, Urine NEGATIVE NEGATIVE mg/dL    Ketones, Urine 10 (1+) (A) NEGATIVE mg/dL    Bilirubin, Urine NEGATIVE NEGATIVE mg/dL    Urobilinogen, Urine Normal Normal mg/dL    Nitrite, Urine NEGATIVE NEGATIVE    Leukocyte Esterase, Urine NEGATIVE NEGATIVE   Extra Urine Gray Tube   Result Value Ref Range    Extra Tube 293       Patient recently received an antibiotic (last 12 hours)       None           Plan discussed with interdisciplinary team, hemoglobin 11.8, urology consulted for large stone, decreased urine output. Will continue current pain management, cannot rule out back pain versus nephrolithiasis, IV hydration, decadron, and urology consult. Monitor overnight and repeat labs in the AM.     Discharge planning discussed with patient and care team. Therapy evaluations ordered. Anticipate HHC/SNF at discharge. Patient aware and agreeable to current plan, continue plan as above.     I spent a total of 60 minutes on the date of the service which included preparing to see the patient, face-to-face patient care, completing clinical documentation, obtaining and/or reviewing separately obtained history, performing a medically appropriate examination, counseling and educating the patient/family/caregiver, ordering medications, tests, or procedures, communicating with other HCPs (not  separately reported), independently interpreting results (not separately reported), communicating results to the patient/family/caregiver, and care coordination (not separately reported).            Isi Velazquez

## 2025-05-14 NOTE — CARE PLAN
The patient's goals for the shift include     Problem: Pain - Adult  Goal: Verbalizes/displays adequate comfort level or baseline comfort level  Outcome: Progressing  Flowsheets (Taken 5/14/2025 1612)  Verbalizes/displays adequate comfort level or baseline comfort level: Assess pain using appropriate pain scale     Problem: Fall/Injury  Goal: Not fall by end of shift  Outcome: Progressing     Problem: Pain  Goal: Turns in bed with improved pain control throughout the shift  Outcome: Progressing     The clinical goals for the shift include pt will remain hemodynamically stable throughout shift.

## 2025-05-15 PROBLEM — N20.0 KIDNEY STONE: Status: ACTIVE | Noted: 2025-05-15

## 2025-05-15 LAB
ALBUMIN SERPL BCP-MCNC: 3.4 G/DL (ref 3.4–5)
ALP SERPL-CCNC: 62 U/L (ref 33–136)
ALT SERPL W P-5'-P-CCNC: 32 U/L (ref 10–52)
ANION GAP SERPL CALC-SCNC: 8 MMOL/L (ref 10–20)
AST SERPL W P-5'-P-CCNC: 30 U/L (ref 9–39)
BILIRUB SERPL-MCNC: 1.2 MG/DL (ref 0–1.2)
BUN SERPL-MCNC: 21 MG/DL (ref 6–23)
CALCIUM SERPL-MCNC: 8.5 MG/DL (ref 8.6–10.3)
CHLORIDE SERPL-SCNC: 102 MMOL/L (ref 98–107)
CO2 SERPL-SCNC: 30 MMOL/L (ref 21–32)
CREAT SERPL-MCNC: 0.63 MG/DL (ref 0.5–1.3)
EGFRCR SERPLBLD CKD-EPI 2021: >90 ML/MIN/1.73M*2
ERYTHROCYTE [DISTWIDTH] IN BLOOD BY AUTOMATED COUNT: 13.6 % (ref 11.5–14.5)
GLUCOSE SERPL-MCNC: 160 MG/DL (ref 74–99)
HCT VFR BLD AUTO: 37.1 % (ref 41–52)
HGB BLD-MCNC: 12.2 G/DL (ref 13.5–17.5)
MCH RBC QN AUTO: 31.8 PG (ref 26–34)
MCHC RBC AUTO-ENTMCNC: 32.9 G/DL (ref 32–36)
MCV RBC AUTO: 97 FL (ref 80–100)
NRBC BLD-RTO: 0 /100 WBCS (ref 0–0)
PLATELET # BLD AUTO: 145 X10*3/UL (ref 150–450)
POTASSIUM SERPL-SCNC: 4.2 MMOL/L (ref 3.5–5.3)
PROT SERPL-MCNC: 6.1 G/DL (ref 6.4–8.2)
RBC # BLD AUTO: 3.84 X10*6/UL (ref 4.5–5.9)
SODIUM SERPL-SCNC: 136 MMOL/L (ref 136–145)
WBC # BLD AUTO: 4 X10*3/UL (ref 4.4–11.3)

## 2025-05-15 PROCEDURE — 1200000002 HC GENERAL ROOM WITH TELEMETRY DAILY

## 2025-05-15 PROCEDURE — 2500000001 HC RX 250 WO HCPCS SELF ADMINISTERED DRUGS (ALT 637 FOR MEDICARE OP)

## 2025-05-15 PROCEDURE — 84075 ASSAY ALKALINE PHOSPHATASE: CPT | Performed by: INTERNAL MEDICINE

## 2025-05-15 PROCEDURE — 2500000001 HC RX 250 WO HCPCS SELF ADMINISTERED DRUGS (ALT 637 FOR MEDICARE OP): Performed by: INTERNAL MEDICINE

## 2025-05-15 PROCEDURE — 85027 COMPLETE CBC AUTOMATED: CPT | Performed by: INTERNAL MEDICINE

## 2025-05-15 PROCEDURE — 36415 COLL VENOUS BLD VENIPUNCTURE: CPT | Performed by: INTERNAL MEDICINE

## 2025-05-15 PROCEDURE — 2500000004 HC RX 250 GENERAL PHARMACY W/ HCPCS (ALT 636 FOR OP/ED): Performed by: INTERNAL MEDICINE

## 2025-05-15 PROCEDURE — 97535 SELF CARE MNGMENT TRAINING: CPT | Mod: GP,CQ

## 2025-05-15 PROCEDURE — 2500000004 HC RX 250 GENERAL PHARMACY W/ HCPCS (ALT 636 FOR OP/ED)

## 2025-05-15 PROCEDURE — 97116 GAIT TRAINING THERAPY: CPT | Mod: GP,CQ

## 2025-05-15 RX ORDER — CYCLOBENZAPRINE HCL 10 MG
10 TABLET ORAL 3 TIMES DAILY PRN
Status: DISCONTINUED | OUTPATIENT
Start: 2025-05-15 | End: 2025-05-18 | Stop reason: HOSPADM

## 2025-05-15 RX ADMIN — ASPIRIN 81 MG: 81 TABLET, COATED ORAL at 08:45

## 2025-05-15 RX ADMIN — HEPARIN SODIUM 5000 UNITS: 5000 INJECTION, SOLUTION INTRAVENOUS; SUBCUTANEOUS at 06:52

## 2025-05-15 RX ADMIN — HEPARIN SODIUM 5000 UNITS: 5000 INJECTION, SOLUTION INTRAVENOUS; SUBCUTANEOUS at 13:46

## 2025-05-15 RX ADMIN — MORPHINE SULFATE 4 MG: 4 INJECTION, SOLUTION INTRAMUSCULAR; INTRAVENOUS at 16:13

## 2025-05-15 RX ADMIN — FINASTERIDE 5 MG: 5 TABLET, FILM COATED ORAL at 08:45

## 2025-05-15 RX ADMIN — DEXTROSE AND SODIUM CHLORIDE 100 ML/HR: 5; .9 INJECTION, SOLUTION INTRAVENOUS at 14:26

## 2025-05-15 RX ADMIN — PANTOPRAZOLE SODIUM 40 MG: 40 TABLET, DELAYED RELEASE ORAL at 06:52

## 2025-05-15 RX ADMIN — MORPHINE SULFATE 2 MG: 4 INJECTION, SOLUTION INTRAMUSCULAR; INTRAVENOUS at 22:20

## 2025-05-15 RX ADMIN — ATORVASTATIN CALCIUM 20 MG: 20 TABLET, FILM COATED ORAL at 08:45

## 2025-05-15 RX ADMIN — DEXTROSE AND SODIUM CHLORIDE 100 ML/HR: 5; .9 INJECTION, SOLUTION INTRAVENOUS at 03:52

## 2025-05-15 RX ADMIN — PHENAZOPYRIDINE 100 MG: 100 TABLET ORAL at 16:14

## 2025-05-15 RX ADMIN — HEPARIN SODIUM 5000 UNITS: 5000 INJECTION, SOLUTION INTRAVENOUS; SUBCUTANEOUS at 22:20

## 2025-05-15 RX ADMIN — DEXAMETHASONE 4 MG: 4 TABLET ORAL at 21:12

## 2025-05-15 RX ADMIN — PHENAZOPYRIDINE 100 MG: 100 TABLET ORAL at 12:31

## 2025-05-15 RX ADMIN — POLYETHYLENE GLYCOL 3350 17 G: 17 POWDER, FOR SOLUTION ORAL at 08:45

## 2025-05-15 RX ADMIN — DEXAMETHASONE 4 MG: 4 TABLET ORAL at 08:45

## 2025-05-15 RX ADMIN — PHENAZOPYRIDINE 100 MG: 100 TABLET ORAL at 08:44

## 2025-05-15 RX ADMIN — PANTOPRAZOLE SODIUM 40 MG: 40 TABLET, DELAYED RELEASE ORAL at 16:13

## 2025-05-15 ASSESSMENT — COGNITIVE AND FUNCTIONAL STATUS - GENERAL
WALKING IN HOSPITAL ROOM: A LITTLE
TURNING FROM BACK TO SIDE WHILE IN FLAT BAD: A LOT
HELP NEEDED FOR BATHING: A LOT
MOVING TO AND FROM BED TO CHAIR: A LOT
DRESSING REGULAR LOWER BODY CLOTHING: A LOT
HELP NEEDED FOR BATHING: A LOT
MOBILITY SCORE: 10
DRESSING REGULAR LOWER BODY CLOTHING: A LOT
WALKING IN HOSPITAL ROOM: TOTAL
CLIMB 3 TO 5 STEPS WITH RAILING: TOTAL
CLIMB 3 TO 5 STEPS WITH RAILING: TOTAL
TURNING FROM BACK TO SIDE WHILE IN FLAT BAD: A LITTLE
CLIMB 3 TO 5 STEPS WITH RAILING: TOTAL
DRESSING REGULAR UPPER BODY CLOTHING: A LITTLE
MOVING FROM LYING ON BACK TO SITTING ON SIDE OF FLAT BED WITH BEDRAILS: A LITTLE
WALKING IN HOSPITAL ROOM: TOTAL
TURNING FROM BACK TO SIDE WHILE IN FLAT BAD: A LOT
STANDING UP FROM CHAIR USING ARMS: A LOT
MOVING TO AND FROM BED TO CHAIR: A LITTLE
STANDING UP FROM CHAIR USING ARMS: A LOT
DAILY ACTIVITIY SCORE: 16
MOVING TO AND FROM BED TO CHAIR: A LOT
DAILY ACTIVITIY SCORE: 14
MOBILITY SCORE: 10
PERSONAL GROOMING: A LOT
STANDING UP FROM CHAIR USING ARMS: A LOT
TOILETING: A LOT
MOBILITY SCORE: 15
DRESSING REGULAR UPPER BODY CLOTHING: A LOT
MOVING FROM LYING ON BACK TO SITTING ON SIDE OF FLAT BED WITH BEDRAILS: A LOT
PERSONAL GROOMING: A LITTLE
TOILETING: A LOT
MOVING FROM LYING ON BACK TO SITTING ON SIDE OF FLAT BED WITH BEDRAILS: A LOT

## 2025-05-15 ASSESSMENT — PAIN DESCRIPTION - DESCRIPTORS
DESCRIPTORS: ACHING;CRAMPING;DISCOMFORT
DESCRIPTORS: ACHING
DESCRIPTORS: ACHING;CRAMPING

## 2025-05-15 ASSESSMENT — PAIN - FUNCTIONAL ASSESSMENT
PAIN_FUNCTIONAL_ASSESSMENT: 0-10

## 2025-05-15 ASSESSMENT — PAIN SCALES - GENERAL
PAINLEVEL_OUTOF10: 4
PAINLEVEL_OUTOF10: 8
PAINLEVEL_OUTOF10: 4
PAINLEVEL_OUTOF10: 0 - NO PAIN

## 2025-05-15 ASSESSMENT — PAIN DESCRIPTION - LOCATION: LOCATION: BACK

## 2025-05-15 ASSESSMENT — PAIN DESCRIPTION - ORIENTATION: ORIENTATION: RIGHT;LEFT;MID

## 2025-05-15 NOTE — PROGRESS NOTES
5/15/2025  Followed up with pt in room, introduced self and explained role.  Pt is from home, he cares for his wife.  Discussed recommendation of skilled care.  Pt declining.  Pt agreeable to HHC.  Wife uses [facility] and he would like them also.  Discussed asking family to help as needed and to do the shopping, light housework.  Stated he could ask them.  Pt has a walker at home. Stated he would like more help with wife's care- bathing.  Stated he has been working with his insurance for more help, but they don't have anyone currently.  Discussed asking family or private duty.  Discussed importance of pt recovering also.  Support provided.  Private duty list provided.   Pt unsure if will need transport home upon discharge.    CT Team will continue to follow.   Shayy Ivan RN TCC

## 2025-05-15 NOTE — DOCUMENTATION CLARIFICATION NOTE
"    PATIENT:               ELIZA FLORES  ACCT #:                  [id number]  MRN:                       [id number]  :                       1935  ADMIT DATE:       2025 6:07 PM  DISCH DATE:  RESPONDING PROVIDER #:        [id number]          PROVIDER RESPONSE TEXT:    Back pain multifactorial 2/2 moderate bilateral foraminal stenosis at L3-L4 and  nephrolithiasis    CDI QUERY TEXT:    Clarification    Instruction:    Based on your assessment of the patient and the clinical information, please provide the requested documentation by clicking on the appropriate radio button and enter any additional information if prompted.    Question: Please clarify if a relationship exists between    When answering this query, please exercise your independent professional judgment. The fact that a question is being asked, does not imply that any particular answer is desired or expected.    The patient's clinical indicators include:  Clinical Information: 89 y.o. male who presented with exacerbation of back pain.    Clinical Indicators:    Radiology:  CT L Spine: \"IMPRESSION:  New 0.4 cm calculus in the distal right ureter without hydronephrosis. There are numerous nonobstructing bilateral renal  calculi that remain.    Multilevel spondylotic changes of the lumbar spine which are similar to prior study as described above and are most pronounced at L3-L4 and L2-L3 with mild canal stenosis in up to moderate bilateral foraminal stenosis at L3-L4.    Status post L4-S1 posterior fusion without evidence of hardware complication.\"     H&P: \"past medical history of HTN, BPH, kidney stones, basal cell cancer on nose, lumbar radiculopathy s/p lumbar spinal fusion, cervical spondylosis with myelopathy, who presented to [facility] ED today with acute exacerbation of back pain.\"     Urology Consult: \"89 yr old male with a right UVJ 4mm calculus with no hydronephrosis and renal calculus.  - Medical expulsive therapy; increase fluids " "continue Flomax and F/U in 1 week\"    5/14 PN: \"Assessment & Plan  Lumbar strain  hemoglobin 11.8, urology consulted for large stone, decreased urine output. Will continue current pain management, cannot rule out back pain versus nephrolithiasis, IV hydration, decadron, and urology consult. Monitor overnight and repeat labs in the AM.\"    Treatment: Medications: D5.9 NS IV at 100ml/hr, Decadron 4mg PO q12h, Morphine 2mg IV q4h PRN pain x2 doses, Flexeril 5mg PO x1, Toradol 15mg IV x1, Morphine 4mg IV x2 doses, urology consult    Risk Factors: History: lumbar radiculopathy s/p lumbar spinal fusion, cervical spondylosis with myelopathy  Options provided:  -- Back pain multifactorial 2/2 moderate bilateral foraminal stenosis at L3-L4 and  nephrolithiasis  -- Back pain 2/2 moderate bilateral foraminal stenosis at L3-L4 only  -- Back pain 2/2 nephrolithiasis only  -- Other - I will add my own diagnosis  -- Refer to Clinical Documentation Reviewer    Query created by: Mel Sanchez on 5/15/2025 10:19 AM      Electronically signed by:  JOSHUA BRANCH MD 5/15/2025 3:53 PM          "

## 2025-05-15 NOTE — PROGRESS NOTES
Colton Jiménez is a 89 y.o. male on day 1 of admission presenting with Lumbar strain, initial encounter.      Subjective     Colton Jiménez seen resting in bed with head of the bed elevated. States pain comes with certain positions to the R-flank.     Objective     Last Recorded Vitals  /70   Pulse 78   Temp 36.6 °C (97.9 °F)   Resp 20   Wt 64.4 kg (142 lb)   SpO2 93%   Intake/Output last 3 Shifts:    Intake/Output Summary (Last 24 hours) at 5/15/2025 1436  Last data filed at 5/15/2025 1427  Gross per 24 hour   Intake 2055 ml   Output 1450 ml   Net 605 ml       Admission Weight  Weight: 64.4 kg (142 lb) (05/13/25 1810)    Daily Weight  05/13/25 : 64.4 kg (142 lb)    Image Results  CT lumbar spine wo IV contrast  Narrative: Interpreted By:  Jack Abdi,   STUDY:  CT LUMBAR SPINE WO IV CONTRAST;  5/13/2025 7:09 pm      INDICATION:  Signs/Symptoms:back pain.          COMPARISON:  None.      ACCESSION NUMBER(S):  [id number]      ORDERING CLINICIAN:  CAMRON SCHULTZ      TECHNIQUE:  Axial noncontrast images of the lumbar spine with coronal and  sagittal reconstructed images.      FINDINGS:  VISUALIZED ABDOMEN: There are numerous nonobstructing bilateral renal  calculi measuring up to 0.8 cm in the right kidney and 0.6 cm the  left kidney. There is new 0.4 cm calculus in the distal right ureter  prominent intrahepatic and extrahepatic bile ducts are likely  secondary to post-cholecystectomy status. Moderate aortic  atherosclerosis without AAA. There is colonic diverticulosis.      PARASPINAL SOFT TISSUES: No significant abnormality.      ALIGNMENT: Grade 1 retrolisthesis of L3 on L4 and anterolisthesis of  L5 on S1 (unchanged). No traumatic spondylolisthesis or traumatic  facet widening.      POSTOPERATIVE CHANGES: Redemonstrated L4-S1 posterior fusion and  L4-L5 and L5 S S1 discectomies. There was solid ankylosis across the  anterior and posterior elements. The hardware is intact without  periprosthetic  lucency or fracture.      VERTEBRAE: Not repeated bone. 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  There is ossification in the interspinous spaces at L4-L5 and L5-S1  similar to prior study. No acute fractures. Vertebral body heights  are maintained.      SPINAL CANAL/INTERVERTEBRAL DISCS: There is no high-grade canal  stenosis. There are disc spur complexes at L2-L3 and L3-L4 which  contribute to mild central canal stenosis in conjunction with  ligamentum flavum thickening and which appears similar to 12/18/2023.      NEURAL FORAMINA: There is multilevel hypertrophic facet arthropathy  of mild degree at L1-L2 and L2-L3, moderate at L3-L4. This  contributes to varying degrees of neural foraminal stenosis in  conjunction with disc disease and is notably most advanced at L3-L4  with moderate bilateral neural foraminal stenosis, also similar to  prior study.      SACROILIAC JOINTS: Intact.      Impression: New 0.4 cm calculus in the distal right ureter without  hydronephrosis. There are numerous nonobstructing bilateral renal  calculi that remain.      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      Status post L4-S1 posterior fusion without evidence of hardware  complication.      MACRO:  None.      Signed by: Jack Abdi 5/13/2025 7:34 PM  Dictation workstation:   [id number]      Physical Exam    General: Awake, alert, in no acute distress  Eyes: Gaze conjugate.  No scleral icterus or injection  HENT: Normo-cephalic, atraumatic. No stridor  CV: Regular rate, regular rhythm. Radial pulses 2+ bilaterally  Resp: Breathing non-labored, speaking in full sentences.  Clear to auscultation bilaterally  GI: Soft, non-distended, non-tender. No rebound or guarding.  :  WNL  MSK/Extremities: No gross bony deformities. Moving all extremities  Skin: Warm. Appropriate color  Neuro: Alert. Oriented. Face symmetric. Speech is fluent.  Gross strength and sensation intact in b/l UE and LEs  Psych: Appropriate mood and affect    10 point ROS negative unless otherwise noted in HPI    Patient fully evaluated 5/15  for    Problem List Items Addressed This Visit          Musculoskeletal and Injuries    * (Principal) Lumbar strain, initial encounter - Primary     Patient seen resting in bed with head of bed elevated, no s/s or c/o acute difficulties at this time.  Vital signs for last 24 hours Temp:  [36.6 °C (97.9 °F)-37.3 °C (99.1 °F)] 36.6 °C (97.9 °F)  Heart Rate:  [67-78] 78  BP: (129-156)/(68-76) 129/70    I/O this shift:  In: 828.3 [I.V.:828.3]  Out: -   Patient still requiring frequent cardiac and SPO2 monitoring. Continue aggressive pulmonary hygiene and oral hygiene. Off loading as tolerated for skin integrity. Medications and labs reviewed-   Results for orders placed or performed during the hospital encounter of 05/13/25 (from the past 24 hours)   CBC and Auto Differential   Result Value Ref Range    WBC 3.1 (L) 4.4 - 11.3 x10*3/uL    nRBC 0.0 0.0 - 0.0 /100 WBCs    RBC 3.56 (L) 4.50 - 5.90 x10*6/uL    Hemoglobin 11.1 (L) 13.5 - 17.5 g/dL    Hematocrit 35.1 (L) 41.0 - 52.0 %    MCV 99 80 - 100 fL    MCH 31.2 26.0 - 34.0 pg    MCHC 31.6 (L) 32.0 - 36.0 g/dL    RDW 13.5 11.5 - 14.5 %    Platelets 141 (L) 150 - 450 x10*3/uL    Neutrophils % 57.2 40.0 - 80.0 %    Immature Granulocytes %, Automated 0.3 0.0 - 0.9 %    Lymphocytes % 24.4 13.0 - 44.0 %    Monocytes % 13.3 2.0 - 10.0 %    Eosinophils % 4.2 0.0 - 6.0 %    Basophils % 0.6 0.0 - 2.0 %    Neutrophils Absolute 1.76 1.60 - 5.50 x10*3/uL    Immature Granulocytes Absolute, Automated 0.01 0.00 - 0.50 x10*3/uL    Lymphocytes Absolute 0.75 (L) 0.80 - 3.00 x10*3/uL    Monocytes Absolute 0.41 0.05 - 0.80 x10*3/uL    Eosinophils Absolute  0.13 0.00 - 0.40 x10*3/uL    Basophils Absolute 0.02 0.00 - 0.10 x10*3/uL   Comprehensive Metabolic Panel   Result Value Ref Range    Glucose 120 (H) 74 - 99 mg/dL    Sodium 136 136 - 145 mmol/L    Potassium 4.0 3.5 - 5.3 mmol/L    Chloride 100 98 - 107 mmol/L    Bicarbonate 32 21 - 32 mmol/L    Anion Gap 8 (L) 10 - 20 mmol/L    Urea Nitrogen 21 6 - 23 mg/dL    Creatinine 0.69 0.50 - 1.30 mg/dL    eGFR 90 >60 mL/min/1.73m*2    Calcium 8.8 8.6 - 10.3 mg/dL    Albumin 3.4 3.4 - 5.0 g/dL    Alkaline Phosphatase 64 33 - 136 U/L    Total Protein 5.9 (L) 6.4 - 8.2 g/dL    AST 45 (H) 9 - 39 U/L    Bilirubin, Total 1.5 (H) 0.0 - 1.2 mg/dL    ALT 40 10 - 52 U/L   Comprehensive Metabolic Panel   Result Value Ref Range    Glucose 160 (H) 74 - 99 mg/dL    Sodium 136 136 - 145 mmol/L    Potassium 4.2 3.5 - 5.3 mmol/L    Chloride 102 98 - 107 mmol/L    Bicarbonate 30 21 - 32 mmol/L    Anion Gap 8 (L) 10 - 20 mmol/L    Urea Nitrogen 21 6 - 23 mg/dL    Creatinine 0.63 0.50 - 1.30 mg/dL    eGFR >90 >60 mL/min/1.73m*2    Calcium 8.5 (L) 8.6 - 10.3 mg/dL    Albumin 3.4 3.4 - 5.0 g/dL    Alkaline Phosphatase 62 33 - 136 U/L    Total Protein 6.1 (L) 6.4 - 8.2 g/dL    AST 30 9 - 39 U/L    Bilirubin, Total 1.2 0.0 - 1.2 mg/dL    ALT 32 10 - 52 U/L   CBC   Result Value Ref Range    WBC 4.0 (L) 4.4 - 11.3 x10*3/uL    nRBC 0.0 0.0 - 0.0 /100 WBCs    RBC 3.84 (L) 4.50 - 5.90 x10*6/uL    Hemoglobin 12.2 (L) 13.5 - 17.5 g/dL    Hematocrit 37.1 (L) 41.0 - 52.0 %    MCV 97 80 - 100 fL    MCH 31.8 26.0 - 34.0 pg    MCHC 32.9 32.0 - 36.0 g/dL    RDW 13.6 11.5 - 14.5 %    Platelets 145 (L) 150 - 450 x10*3/uL      Patient recently received an antibiotic (last 12 hours)       None           Plan discussed with interdisciplinary team, continue current and repeat labs in the AM.       Discharge planning discussed with patient and care team. Therapy evaluations ordered.   [facility]    Patient aware and agreeable to current plan, continue plan as above.      I spent a total of 60 minutes on the date of the service which included preparing to see the patient, face-to-face patient care, completing clinical documentation, obtaining and/or reviewing separately obtained history, performing a medically appropriate examination, counseling and educating the patient/family/caregiver, ordering medications, tests, or procedures, communicating with other HCPs (not separately reported), independently interpreting results (not separately reported), communicating results to the patient/family/caregiver, and care coordination (not separately reported).                Assessment & Plan  Lumbar strain, initial encounter    Kidney stone  Expulsion therapy per nephrology   Flexeril 10mg TID for pain              ALFREDO DAVEYS

## 2025-05-15 NOTE — CARE PLAN
The patient's goals for the shift include no pain  able to move around    The clinical goals for the shift include will tolerate turns and repositioning every 2 hours throughout shift, ending 5/15/25 @ 0700 hrs.    Over the shift, the patient did not make progress toward the following goals. Barriers to progression include severe back pain when assisted to turn. Mild pain at rest. Recommendations to address these barriers include PRN morphine prior to turning and PT/OT to assist with mobility.

## 2025-05-15 NOTE — CARE PLAN
The patient's goals for the shift include     Problem: Pain - Adult  Goal: Verbalizes/displays adequate comfort level or baseline comfort level  Outcome: Progressing     Problem: Fall/Injury  Goal: Not fall by end of shift  Outcome: Progressing     Problem: Pain  Goal: Turns in bed with improved pain control throughout the shift  Outcome: Progressing     Problem: Skin  Goal: Prevent/minimize sheer/friction injuries  Outcome: Progressing  Flowsheets (Taken 5/15/2025 1133)  Prevent/minimize sheer/friction injuries:   HOB 30 degrees or less   Turn/reposition every 2 hours/use positioning/transfer devices     The clinical goals for the shift include Pt will remain hemodynamically stable throughout shift.

## 2025-05-15 NOTE — PROGRESS NOTES
Physical Therapy    Physical Therapy Treatment    Patient Name: Colton Jiménez  MRN: [id number]  Department: [facility]  Room: [address] [place] [address]  Today's Date: 5/15/2025  Time Calculation  Start Time: 1350  Stop Time: 1413  Time Calculation (min): 23 min         Assessment/Plan   PT Assessment  End of Session Communication: Bedside nurse, PCT/NA/CTA  End of Session Patient Position: Up in chair, Alarm on     PT Plan  Treatment/Interventions: Bed mobility, Transfer training, Gait training, Stair training, Therapeutic exercise, Therapeutic activity  PT Plan: Ongoing PT  PT Frequency: 4 times per week  PT Discharge Recommendations: Moderate intensity level of continued care  PT Recommended Transfer Status: Assist x1  PT - OK to Discharge: Yes (to next level of care when cleared by medical team)      General Visit Information:   General  Prior to Session Communication: Bedside nurse, PCT/NA/CTA  Patient Position Received: Bed, 3 rail up, Alarm off, not on at start of session  General Comment:  (pt agreeable to participate in therapy with min encouragment.)    Subjective   Precautions:  Precautions  Hearing/Visual Limitations: [place]  Medical Precautions: Fall precautions  Precautions Comment: tele; piv; external catheter        Objective   Pain:  Pain Assessment  Pain Assessment: 0-10  0-10 (Numeric) Pain Score:  (2 at rest;  5-6 with mobility)  Pain Interventions:  (pt asking for pain meds end of tx session; communicated with RN)    Cognition:  Cognition  Overall Cognitive Status: Within Functional Limits     Treatments:  Bed Mobility  Bed Mobility:  (sup > sit with CGA using bedrail used to assist.  increased time to complete and scoot fully to edge of bed.)    Ambulation/Gait Training  Ambulation/Gait Training Performed:  (pt ambulates approx. 12 ft x 2 with FWW and CGA to min A x 1 plus chair follow for safety.  slow pace; partial step-through; stooped posture.  heavy BUE use through walker handles.)    Transfers  Transfer:  (sit <>  stand with FWW and min/mod A x 2.  cuing for safe hand placement/ sequencing.)    Outcome Measures:  [facility] Basic Mobility  Turning from your back to your side while in a flat bed without using bedrails: A little  Moving from lying on your back to sitting on the side of a flat bed without using bedrails: A little  Moving to and from bed to chair (including a wheelchair): A little  Standing up from a chair using your arms (e.g. wheelchair or bedside chair): A lot  To walk in hospital room: A little  Climbing 3-5 steps with railing: Total  Basic Mobility - Total Score: 15    Education Documentation  Precautions, taught by Ivis Melo PTA at 5/15/2025  2:55 PM.  Learner: Patient  Readiness: Acceptance  Method: Explanation  Response: Verbalizes Understanding    Mobility Training, taught by Ivis Melo PTA at 5/15/2025  2:55 PM.  Learner: Patient  Readiness: Acceptance  Method: Explanation  Response: Verbalizes Understanding    Education Comments  No comments found.        EDUCATION:       Encounter Problems       Encounter Problems (Active)       PT Problem       STG - Pt will transition supine <> sitting with min assist x1 using log rolling technique (Progressing)       Start:  05/14/25    Expected End:  05/28/25            STG - Pt will transfer STS with cga  (Progressing)       Start:  05/14/25    Expected End:  05/28/25            STG - Pt will amb >=40' using ww with cga  (Progressing)       Start:  05/14/25    Expected End:  05/28/25            STG - Pt will perform 2-3 sets of BLE therex x10 to maximize functional strength and independence  (Progressing)       Start:  05/14/25    Expected End:  05/28/25            STG - Pt will maintain standing supported balance with no LOB noted   (Progressing)       Start:  05/14/25    Expected End:  05/28/25

## 2025-05-16 ENCOUNTER — TELEPHONE (OUTPATIENT)
Dept: PAIN MEDICINE | Facility: CLINIC | Age: 89
End: 2025-05-16

## 2025-05-16 LAB
ALBUMIN SERPL BCP-MCNC: 3.1 G/DL (ref 3.4–5)
ALP SERPL-CCNC: 53 U/L (ref 33–136)
ALT SERPL W P-5'-P-CCNC: 21 U/L (ref 10–52)
ANION GAP SERPL CALC-SCNC: 10 MMOL/L (ref 10–20)
AST SERPL W P-5'-P-CCNC: 17 U/L (ref 9–39)
BILIRUB SERPL-MCNC: 0.9 MG/DL (ref 0–1.2)
BUN SERPL-MCNC: 23 MG/DL (ref 6–23)
CALCIUM SERPL-MCNC: 8.7 MG/DL (ref 8.6–10.3)
CHLORIDE SERPL-SCNC: 106 MMOL/L (ref 98–107)
CO2 SERPL-SCNC: 28 MMOL/L (ref 21–32)
CREAT SERPL-MCNC: 0.56 MG/DL (ref 0.5–1.3)
EGFRCR SERPLBLD CKD-EPI 2021: >90 ML/MIN/1.73M*2
ERYTHROCYTE [DISTWIDTH] IN BLOOD BY AUTOMATED COUNT: 13.7 % (ref 11.5–14.5)
GLUCOSE SERPL-MCNC: 104 MG/DL (ref 74–99)
HCT VFR BLD AUTO: 34.2 % (ref 41–52)
HGB BLD-MCNC: 11.1 G/DL (ref 13.5–17.5)
MCH RBC QN AUTO: 31.4 PG (ref 26–34)
MCHC RBC AUTO-ENTMCNC: 32.5 G/DL (ref 32–36)
MCV RBC AUTO: 97 FL (ref 80–100)
NRBC BLD-RTO: 0 /100 WBCS (ref 0–0)
PLATELET # BLD AUTO: 144 X10*3/UL (ref 150–450)
POTASSIUM SERPL-SCNC: 4.1 MMOL/L (ref 3.5–5.3)
PROT SERPL-MCNC: 5.6 G/DL (ref 6.4–8.2)
RBC # BLD AUTO: 3.53 X10*6/UL (ref 4.5–5.9)
SODIUM SERPL-SCNC: 140 MMOL/L (ref 136–145)
WBC # BLD AUTO: 6.3 X10*3/UL (ref 4.4–11.3)

## 2025-05-16 PROCEDURE — 2500000001 HC RX 250 WO HCPCS SELF ADMINISTERED DRUGS (ALT 637 FOR MEDICARE OP)

## 2025-05-16 PROCEDURE — 2500000004 HC RX 250 GENERAL PHARMACY W/ HCPCS (ALT 636 FOR OP/ED)

## 2025-05-16 PROCEDURE — 1200000002 HC GENERAL ROOM WITH TELEMETRY DAILY

## 2025-05-16 PROCEDURE — 80053 COMPREHEN METABOLIC PANEL: CPT | Performed by: INTERNAL MEDICINE

## 2025-05-16 PROCEDURE — 2500000001 HC RX 250 WO HCPCS SELF ADMINISTERED DRUGS (ALT 637 FOR MEDICARE OP): Performed by: INTERNAL MEDICINE

## 2025-05-16 PROCEDURE — 2500000005 HC RX 250 GENERAL PHARMACY W/O HCPCS: Performed by: INTERNAL MEDICINE

## 2025-05-16 PROCEDURE — 2500000004 HC RX 250 GENERAL PHARMACY W/ HCPCS (ALT 636 FOR OP/ED): Performed by: INTERNAL MEDICINE

## 2025-05-16 PROCEDURE — 36415 COLL VENOUS BLD VENIPUNCTURE: CPT | Performed by: INTERNAL MEDICINE

## 2025-05-16 PROCEDURE — 85027 COMPLETE CBC AUTOMATED: CPT | Performed by: INTERNAL MEDICINE

## 2025-05-16 PROCEDURE — 2500000001 HC RX 250 WO HCPCS SELF ADMINISTERED DRUGS (ALT 637 FOR MEDICARE OP): Performed by: NURSE PRACTITIONER

## 2025-05-16 RX ORDER — OXYCODONE HYDROCHLORIDE 5 MG/1
5 TABLET ORAL EVERY 6 HOURS PRN
Refills: 0 | Status: DISCONTINUED | OUTPATIENT
Start: 2025-05-16 | End: 2025-05-18 | Stop reason: HOSPADM

## 2025-05-16 RX ORDER — LIDOCAINE 560 MG/1
2 PATCH PERCUTANEOUS; TOPICAL; TRANSDERMAL DAILY
Status: DISCONTINUED | OUTPATIENT
Start: 2025-05-16 | End: 2025-05-18 | Stop reason: HOSPADM

## 2025-05-16 RX ORDER — ACETAMINOPHEN 325 MG/1
975 TABLET ORAL EVERY 8 HOURS
Status: DISCONTINUED | OUTPATIENT
Start: 2025-05-16 | End: 2025-05-18 | Stop reason: HOSPADM

## 2025-05-16 RX ORDER — ACETAMINOPHEN 500 MG
5 TABLET ORAL ONCE
Status: DISCONTINUED | OUTPATIENT
Start: 2025-05-17 | End: 2025-05-17

## 2025-05-16 RX ORDER — BISACODYL 10 MG/1
10 SUPPOSITORY RECTAL DAILY
Status: DISCONTINUED | OUTPATIENT
Start: 2025-05-16 | End: 2025-05-18 | Stop reason: HOSPADM

## 2025-05-16 RX ORDER — TRAMADOL HYDROCHLORIDE 50 MG/1
50 TABLET, FILM COATED ORAL EVERY 8 HOURS PRN
Status: DISCONTINUED | OUTPATIENT
Start: 2025-05-16 | End: 2025-05-18 | Stop reason: HOSPADM

## 2025-05-16 RX ADMIN — POLYETHYLENE GLYCOL 3350 17 G: 17 POWDER, FOR SOLUTION ORAL at 09:22

## 2025-05-16 RX ADMIN — LIDOCAINE 4% 2 PATCH: 40 PATCH TOPICAL at 16:53

## 2025-05-16 RX ADMIN — HEPARIN SODIUM 5000 UNITS: 5000 INJECTION, SOLUTION INTRAVENOUS; SUBCUTANEOUS at 15:01

## 2025-05-16 RX ADMIN — PANTOPRAZOLE SODIUM 40 MG: 40 TABLET, DELAYED RELEASE ORAL at 16:52

## 2025-05-16 RX ADMIN — ASPIRIN 81 MG: 81 TABLET, COATED ORAL at 09:22

## 2025-05-16 RX ADMIN — HEPARIN SODIUM 5000 UNITS: 5000 INJECTION, SOLUTION INTRAVENOUS; SUBCUTANEOUS at 21:33

## 2025-05-16 RX ADMIN — DEXAMETHASONE 4 MG: 4 TABLET ORAL at 20:45

## 2025-05-16 RX ADMIN — ACETAMINOPHEN 975 MG: 325 TABLET ORAL at 16:52

## 2025-05-16 RX ADMIN — DEXAMETHASONE 4 MG: 4 TABLET ORAL at 09:21

## 2025-05-16 RX ADMIN — ACETAMINOPHEN 650 MG: 325 TABLET ORAL at 09:32

## 2025-05-16 RX ADMIN — PHENAZOPYRIDINE 100 MG: 100 TABLET ORAL at 09:21

## 2025-05-16 RX ADMIN — PANTOPRAZOLE SODIUM 40 MG: 40 TABLET, DELAYED RELEASE ORAL at 06:01

## 2025-05-16 RX ADMIN — PHENAZOPYRIDINE 100 MG: 100 TABLET ORAL at 16:52

## 2025-05-16 RX ADMIN — FINASTERIDE 5 MG: 5 TABLET, FILM COATED ORAL at 09:22

## 2025-05-16 RX ADMIN — ATORVASTATIN CALCIUM 20 MG: 20 TABLET, FILM COATED ORAL at 09:22

## 2025-05-16 RX ADMIN — HEPARIN SODIUM 5000 UNITS: 5000 INJECTION, SOLUTION INTRAVENOUS; SUBCUTANEOUS at 06:01

## 2025-05-16 RX ADMIN — PHENAZOPYRIDINE 100 MG: 100 TABLET ORAL at 12:46

## 2025-05-16 RX ADMIN — BISACODYL 10 MG: 10 SUPPOSITORY RECTAL at 15:02

## 2025-05-16 ASSESSMENT — PAIN - FUNCTIONAL ASSESSMENT
PAIN_FUNCTIONAL_ASSESSMENT: 0-10

## 2025-05-16 ASSESSMENT — PAIN SCALES - GENERAL
PAINLEVEL_OUTOF10: 3
PAINLEVEL_OUTOF10: 3
PAINLEVEL_OUTOF10: 5 - MODERATE PAIN

## 2025-05-16 ASSESSMENT — PAIN DESCRIPTION - LOCATION: LOCATION: BACK

## 2025-05-16 ASSESSMENT — PAIN DESCRIPTION - ORIENTATION: ORIENTATION: RIGHT;LOWER

## 2025-05-16 NOTE — CARE PLAN
The patient's goals for the shift include no pain  able to move around    The clinical goals for the shift include Will remain safe/free from falls/injury throughout shift ending 5/16/25 @ 0700 hrs.    VS stable. Morphine administered once overnight. Asleep for the rest of the shift. No acute events.

## 2025-05-16 NOTE — TELEPHONE ENCOUNTER
Colton called from hospital bed, he had a couple questions to ask you (steroid drip as good as pills) etc.  Could you please give him a call

## 2025-05-16 NOTE — CARE PLAN
The patient's goals for the shift include no pain  able to move around    The clinical goals for the shift include Will remain safe/free from falls/injury throughout shift ending 5/16/25 @ 0700 hrs.    Over the shift, the patient did make progress toward the following goals.

## 2025-05-17 PROCEDURE — 2500000001 HC RX 250 WO HCPCS SELF ADMINISTERED DRUGS (ALT 637 FOR MEDICARE OP)

## 2025-05-17 PROCEDURE — 2500000004 HC RX 250 GENERAL PHARMACY W/ HCPCS (ALT 636 FOR OP/ED): Performed by: INTERNAL MEDICINE

## 2025-05-17 PROCEDURE — 2500000001 HC RX 250 WO HCPCS SELF ADMINISTERED DRUGS (ALT 637 FOR MEDICARE OP): Performed by: INTERNAL MEDICINE

## 2025-05-17 PROCEDURE — 2500000005 HC RX 250 GENERAL PHARMACY W/O HCPCS: Performed by: INTERNAL MEDICINE

## 2025-05-17 PROCEDURE — 2500000004 HC RX 250 GENERAL PHARMACY W/ HCPCS (ALT 636 FOR OP/ED)

## 2025-05-17 PROCEDURE — 2500000002 HC RX 250 W HCPCS SELF ADMINISTERED DRUGS (ALT 637 FOR MEDICARE OP, ALT 636 FOR OP/ED): Performed by: INTERNAL MEDICINE

## 2025-05-17 PROCEDURE — 1100000001 HC PRIVATE ROOM DAILY

## 2025-05-17 PROCEDURE — 2500000001 HC RX 250 WO HCPCS SELF ADMINISTERED DRUGS (ALT 637 FOR MEDICARE OP): Performed by: NURSE PRACTITIONER

## 2025-05-17 RX ORDER — QUETIAPINE FUMARATE 25 MG/1
12.5 TABLET, FILM COATED ORAL 2 TIMES DAILY PRN
Status: DISCONTINUED | OUTPATIENT
Start: 2025-05-17 | End: 2025-05-18 | Stop reason: HOSPADM

## 2025-05-17 RX ORDER — QUETIAPINE FUMARATE 25 MG/1
12.5 TABLET, FILM COATED ORAL NIGHTLY
Status: DISCONTINUED | OUTPATIENT
Start: 2025-05-17 | End: 2025-05-18 | Stop reason: HOSPADM

## 2025-05-17 RX ORDER — ACETAMINOPHEN 500 MG
5 TABLET ORAL NIGHTLY
Status: DISCONTINUED | OUTPATIENT
Start: 2025-05-17 | End: 2025-05-18 | Stop reason: HOSPADM

## 2025-05-17 RX ADMIN — Medication 5 MG: at 21:19

## 2025-05-17 RX ADMIN — HEPARIN SODIUM 5000 UNITS: 5000 INJECTION, SOLUTION INTRAVENOUS; SUBCUTANEOUS at 14:20

## 2025-05-17 RX ADMIN — ASPIRIN 81 MG: 81 TABLET, COATED ORAL at 09:04

## 2025-05-17 RX ADMIN — ACETAMINOPHEN 975 MG: 325 TABLET ORAL at 16:55

## 2025-05-17 RX ADMIN — QUETIAPINE FUMARATE 12.5 MG: 25 TABLET ORAL at 21:19

## 2025-05-17 RX ADMIN — HEPARIN SODIUM 5000 UNITS: 5000 INJECTION, SOLUTION INTRAVENOUS; SUBCUTANEOUS at 22:04

## 2025-05-17 RX ADMIN — LIDOCAINE 4% 2 PATCH: 40 PATCH TOPICAL at 09:05

## 2025-05-17 RX ADMIN — BISACODYL 10 MG: 10 SUPPOSITORY RECTAL at 09:05

## 2025-05-17 RX ADMIN — ATORVASTATIN CALCIUM 20 MG: 20 TABLET, FILM COATED ORAL at 09:05

## 2025-05-17 RX ADMIN — PHENAZOPYRIDINE 100 MG: 100 TABLET ORAL at 09:08

## 2025-05-17 RX ADMIN — POLYETHYLENE GLYCOL 3350 17 G: 17 POWDER, FOR SOLUTION ORAL at 09:05

## 2025-05-17 RX ADMIN — FINASTERIDE 5 MG: 5 TABLET, FILM COATED ORAL at 09:05

## 2025-05-17 RX ADMIN — DEXAMETHASONE 4 MG: 4 TABLET ORAL at 09:04

## 2025-05-17 RX ADMIN — PANTOPRAZOLE SODIUM 40 MG: 40 TABLET, DELAYED RELEASE ORAL at 16:55

## 2025-05-17 RX ADMIN — ACETAMINOPHEN 975 MG: 325 TABLET ORAL at 09:04

## 2025-05-17 ASSESSMENT — COGNITIVE AND FUNCTIONAL STATUS - GENERAL
STANDING UP FROM CHAIR USING ARMS: A LITTLE
TURNING FROM BACK TO SIDE WHILE IN FLAT BAD: A LITTLE
MOVING TO AND FROM BED TO CHAIR: A LITTLE
HELP NEEDED FOR BATHING: A LITTLE
PERSONAL GROOMING: A LITTLE
MOVING FROM LYING ON BACK TO SITTING ON SIDE OF FLAT BED WITH BEDRAILS: A LITTLE
DRESSING REGULAR UPPER BODY CLOTHING: A LITTLE
WALKING IN HOSPITAL ROOM: A LITTLE
TOILETING: A LITTLE
MOBILITY SCORE: 18
DRESSING REGULAR LOWER BODY CLOTHING: A LITTLE
CLIMB 3 TO 5 STEPS WITH RAILING: A LITTLE
EATING MEALS: A LITTLE
DAILY ACTIVITIY SCORE: 18

## 2025-05-17 ASSESSMENT — PAIN - FUNCTIONAL ASSESSMENT: PAIN_FUNCTIONAL_ASSESSMENT: 0-10

## 2025-05-17 ASSESSMENT — PAIN SCALES - GENERAL
PAINLEVEL_OUTOF10: 0 - NO PAIN
PAINLEVEL_OUTOF10: 5 - MODERATE PAIN

## 2025-05-17 NOTE — PROGRESS NOTES
Colton Jiménez is a 89 y.o. male on day 2 of admission presenting with Lumbar strain, initial encounter.      Subjective   No events overnight. Patient seen and examined at bedside. He still has significant back pain with movement. He is ok when laying still. He is adamantly refusing SNF.       Objective     Last Recorded Vitals  /67 (BP Location: Right arm, Patient Position: Lying)   Pulse 73   Temp 36.9 °C (98.4 °F) (Temporal)   Resp 16   Wt 64.4 kg (142 lb)   SpO2 94%   Intake/Output last 3 Shifts:    Intake/Output Summary (Last 24 hours) at 5/16/2025 2318  Last data filed at 5/16/2025 1457  Gross per 24 hour   Intake --   Output 1650 ml   Net -1650 ml       Admission Weight  Weight: 64.4 kg (142 lb) (05/13/25 1810)    Daily Weight  05/13/25 : 64.4 kg (142 lb)    Image Results  CT lumbar spine wo IV contrast  Narrative: Interpreted By:  Jack Abdi,   STUDY:  CT LUMBAR SPINE WO IV CONTRAST;  5/13/2025 7:09 pm      INDICATION:  Signs/Symptoms:back pain.          COMPARISON:  None.      ACCESSION NUMBER(S):  [id number]      ORDERING CLINICIAN:  CAMRON SCHULTZ      TECHNIQUE:  Axial noncontrast images of the lumbar spine with coronal and  sagittal reconstructed images.      FINDINGS:  VISUALIZED ABDOMEN: There are numerous nonobstructing bilateral renal  calculi measuring up to 0.8 cm in the right kidney and 0.6 cm the  left kidney. There is new 0.4 cm calculus in the distal right ureter  prominent intrahepatic and extrahepatic bile ducts are likely  secondary to post-cholecystectomy status. Moderate aortic  atherosclerosis without AAA. There is colonic diverticulosis.      PARASPINAL SOFT TISSUES: No significant abnormality.      ALIGNMENT: Grade 1 retrolisthesis of L3 on L4 and anterolisthesis of  L5 on S1 (unchanged). No traumatic spondylolisthesis or traumatic  facet widening.      POSTOPERATIVE CHANGES: Redemonstrated L4-S1 posterior fusion and  L4-L5 and L5 S S1 discectomies. There was solid  ankylosis across the  anterior and posterior elements. The hardware is intact without  periprosthetic lucency or fracture.      VERTEBRAE: Not repeated bone. 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  There is ossification in the interspinous spaces at L4-L5 and L5-S1  similar to prior study. No acute fractures. Vertebral body heights  are maintained.      SPINAL CANAL/INTERVERTEBRAL DISCS: There is no high-grade canal  stenosis. There are disc spur complexes at L2-L3 and L3-L4 which  contribute to mild central canal stenosis in conjunction with  ligamentum flavum thickening and which appears similar to 12/18/2023.      NEURAL FORAMINA: There is multilevel hypertrophic facet arthropathy  of mild degree at L1-L2 and L2-L3, moderate at L3-L4. This  contributes to varying degrees of neural foraminal stenosis in  conjunction with disc disease and is notably most advanced at L3-L4  with moderate bilateral neural foraminal stenosis, also similar to  prior study.      SACROILIAC JOINTS: Intact.      Impression: New 0.4 cm calculus in the distal right ureter without  hydronephrosis. There are numerous nonobstructing bilateral renal  calculi that remain.      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      Status post L4-S1 posterior fusion without evidence of hardware  complication.      MACRO:  None.      Signed by: Jack Abdi 5/13/2025 7:34 PM  Dictation workstation:   [id number]      Physical Exam  Constitutional:       Appearance: Normal appearance.      Comments: Pleasant elderly male resting in bed in discomfort   HENT:      Head: Normocephalic and atraumatic.      Nose: Nose normal.      Mouth/Throat:      Mouth: Mucous membranes are moist.      Pharynx: Oropharynx  is clear.   Eyes:      Extraocular Movements: Extraocular movements intact.      Conjunctiva/sclera: Conjunctivae normal.      Pupils: Pupils are equal, round, and reactive to light.   Cardiovascular:      Rate and Rhythm: Normal rate and regular rhythm.      Pulses: Normal pulses.      Heart sounds: Normal heart sounds.   Pulmonary:      Effort: Pulmonary effort is normal.      Breath sounds: Normal breath sounds.   Abdominal:      General: Abdomen is flat. Bowel sounds are normal.      Palpations: Abdomen is soft.   Musculoskeletal:         General: Normal range of motion.      Cervical back: Normal range of motion and neck supple.      Comments: Right paraspinal lumbar tenderness, limited ROM due to pain. NV intact in lower extremities.   Skin:     General: Skin is warm and dry.      Capillary Refill: Capillary refill takes less than 2 seconds.   Neurological:      General: No focal deficit present.      Mental Status: He is alert and oriented to person, place, and time.   Psychiatric:         Mood and Affect: Mood normal.         Behavior: Behavior normal.         Thought Content: Thought content normal.         Judgment: Judgment normal.      Relevant Results                              Assessment & Plan  Lumbar strain, initial encounter    Kidney stone    89 year old male who presented to ED today with acute exacerbation of back pain and unable to ambulate. PT/OT/SW ordered. Patient will be hospitalized for further medical management.      Lumbar strain  Ambulatory dysfunction  Hx of chronic back pain in pain management  Admit to OBS/telemety to Dr. Moore   Defer consults to attending per request  See imaging results   Analgesics/Zofran PRN   PT/OT/SW  Repeat labs in AM      Pancytopenia, chronic  WBC 3.8, H&H 11.8/35.1, platelets 144  Continue to monitor     Chronic conditions   #HTN, BPH, kidney stones, basal cell carcinoma  Continue home medications as appropriate when nursing completes home med rec    Full code      DVT PPX   Ambulation   SCD's as tolerated      5/16: Transition to PO medication for pain control. Add lidoderm patches as well. Anticipate discharge home with [facility] this weekend.           Erik Castillo, DO

## 2025-05-17 NOTE — NURSING NOTE
Spoke with patient's daughter about his change in behavior this morning. Patient refused medications and has been yelling out uncontrollably. Difficult to redirect. PRN sitter in with patient.

## 2025-05-17 NOTE — PROGRESS NOTES
Was notified by nurse regarding patient being found in the wrong room.  He is currently taking opioids and muscle relaxer as needed.  Melatonin and sitter as needed ordered.  Continue to monitor, attending to follow.           Andi Rebolledo PA-C

## 2025-05-17 NOTE — NURSING NOTE
PCA found patient in room 634 asking for his nurse. He was in 635. Patient did not use call light. Patient states he pooped on the floor, picked it up and threw it away after getting out of bed. RN moved patient to 620 for safety purposes. NP made aware.

## 2025-05-17 NOTE — CARE PLAN
Problem: Pain - Adult  Goal: Verbalizes/displays adequate comfort level or baseline comfort level  Outcome: Progressing     Problem: Skin  Goal: Prevent/minimize sheer/friction injuries  Outcome: Progressing     Problem: Pain  Goal: Turns in bed with improved pain control throughout the shift  Outcome: Progressing

## 2025-05-18 VITALS
HEIGHT: 71 IN | BODY MASS INDEX: 19.88 KG/M2 | HEART RATE: 63 BPM | TEMPERATURE: 97.7 F | RESPIRATION RATE: 17 BRPM | SYSTOLIC BLOOD PRESSURE: 144 MMHG | OXYGEN SATURATION: 97 % | DIASTOLIC BLOOD PRESSURE: 76 MMHG | WEIGHT: 142 LBS

## 2025-05-18 PROCEDURE — 2500000001 HC RX 250 WO HCPCS SELF ADMINISTERED DRUGS (ALT 637 FOR MEDICARE OP): Performed by: NURSE PRACTITIONER

## 2025-05-18 PROCEDURE — 2500000001 HC RX 250 WO HCPCS SELF ADMINISTERED DRUGS (ALT 637 FOR MEDICARE OP)

## 2025-05-18 PROCEDURE — 2500000001 HC RX 250 WO HCPCS SELF ADMINISTERED DRUGS (ALT 637 FOR MEDICARE OP): Performed by: INTERNAL MEDICINE

## 2025-05-18 PROCEDURE — 2500000005 HC RX 250 GENERAL PHARMACY W/O HCPCS: Performed by: INTERNAL MEDICINE

## 2025-05-18 PROCEDURE — 2500000004 HC RX 250 GENERAL PHARMACY W/ HCPCS (ALT 636 FOR OP/ED)

## 2025-05-18 RX ORDER — TRAMADOL HYDROCHLORIDE 50 MG/1
50 TABLET, FILM COATED ORAL EVERY 8 HOURS PRN
Qty: 10 TABLET | Refills: 0 | Status: SHIPPED | OUTPATIENT
Start: 2025-05-18

## 2025-05-18 RX ORDER — CYCLOBENZAPRINE HCL 10 MG
10 TABLET ORAL 3 TIMES DAILY PRN
Qty: 30 TABLET | Refills: 0 | Status: SHIPPED | OUTPATIENT
Start: 2025-05-18

## 2025-05-18 RX ORDER — OXYCODONE HYDROCHLORIDE 5 MG/1
5 TABLET ORAL EVERY 6 HOURS PRN
Qty: 15 TABLET | Refills: 0 | Status: SHIPPED | OUTPATIENT
Start: 2025-05-18

## 2025-05-18 RX ORDER — POLYETHYLENE GLYCOL 3350 17 G/17G
17 POWDER, FOR SOLUTION ORAL DAILY
Qty: 30 PACKET | Refills: 0 | Status: SHIPPED | OUTPATIENT
Start: 2025-05-19

## 2025-05-18 RX ORDER — ACETAMINOPHEN 325 MG/1
975 TABLET ORAL EVERY 8 HOURS
Qty: 90 TABLET | Refills: 0 | Status: SHIPPED | OUTPATIENT
Start: 2025-05-18

## 2025-05-18 RX ORDER — LIDOCAINE 560 MG/1
2 PATCH PERCUTANEOUS; TOPICAL; TRANSDERMAL DAILY
Qty: 30 PATCH | Refills: 0 | Status: SHIPPED | OUTPATIENT
Start: 2025-05-19

## 2025-05-18 RX ORDER — PANTOPRAZOLE SODIUM 40 MG/1
40 TABLET, DELAYED RELEASE ORAL
Qty: 30 TABLET | Refills: 0 | Status: SHIPPED | OUTPATIENT
Start: 2025-05-18

## 2025-05-18 RX ADMIN — LIDOCAINE 4% 2 PATCH: 40 PATCH TOPICAL at 09:11

## 2025-05-18 RX ADMIN — ACETAMINOPHEN 975 MG: 325 TABLET ORAL at 01:01

## 2025-05-18 RX ADMIN — BISACODYL 10 MG: 10 SUPPOSITORY RECTAL at 09:11

## 2025-05-18 RX ADMIN — ASPIRIN 81 MG: 81 TABLET, COATED ORAL at 09:10

## 2025-05-18 RX ADMIN — PANTOPRAZOLE SODIUM 40 MG: 40 TABLET, DELAYED RELEASE ORAL at 06:28

## 2025-05-18 RX ADMIN — ACETAMINOPHEN 975 MG: 325 TABLET ORAL at 09:10

## 2025-05-18 RX ADMIN — HEPARIN SODIUM 5000 UNITS: 5000 INJECTION, SOLUTION INTRAVENOUS; SUBCUTANEOUS at 14:50

## 2025-05-18 RX ADMIN — ATORVASTATIN CALCIUM 20 MG: 20 TABLET, FILM COATED ORAL at 09:10

## 2025-05-18 RX ADMIN — POLYETHYLENE GLYCOL 3350 17 G: 17 POWDER, FOR SOLUTION ORAL at 09:11

## 2025-05-18 RX ADMIN — FINASTERIDE 5 MG: 5 TABLET, FILM COATED ORAL at 09:10

## 2025-05-18 RX ADMIN — HEPARIN SODIUM 5000 UNITS: 5000 INJECTION, SOLUTION INTRAVENOUS; SUBCUTANEOUS at 06:29

## 2025-05-18 ASSESSMENT — COGNITIVE AND FUNCTIONAL STATUS - GENERAL
MOVING FROM LYING ON BACK TO SITTING ON SIDE OF FLAT BED WITH BEDRAILS: A LITTLE
DRESSING REGULAR LOWER BODY CLOTHING: A LITTLE
MOVING TO AND FROM BED TO CHAIR: A LITTLE
TURNING FROM BACK TO SIDE WHILE IN FLAT BAD: A LITTLE
CLIMB 3 TO 5 STEPS WITH RAILING: A LITTLE
EATING MEALS: A LITTLE
STANDING UP FROM CHAIR USING ARMS: A LITTLE
TOILETING: A LITTLE
MOBILITY SCORE: 18
WALKING IN HOSPITAL ROOM: A LITTLE
DAILY ACTIVITIY SCORE: 18
PERSONAL GROOMING: A LITTLE
DRESSING REGULAR UPPER BODY CLOTHING: A LITTLE
HELP NEEDED FOR BATHING: A LITTLE

## 2025-05-18 ASSESSMENT — PAIN SCALES - GENERAL: PAINLEVEL_OUTOF10: 4

## 2025-05-18 NOTE — PROGRESS NOTES
Colton Jiménez is a 89 y.o. male on day 4 of admission presenting with Lumbar strain, initial encounter.      Subjective   Patient wandered into another room overnight.  He has been up ambulating.  He states that it does make his back pain worse, but it is tolerable.  He did go and see his wife on another floor as well.       Objective     Last Recorded Vitals  /76 (BP Location: Right arm, Patient Position: Sitting)   Pulse 63   Temp 36.5 °C (97.7 °F) (Temporal)   Resp 17   Wt 64.4 kg (142 lb)   SpO2 97%   Intake/Output last 3 Shifts:    Intake/Output Summary (Last 24 hours) at 5/18/2025 1443  Last data filed at 5/18/2025 0519  Gross per 24 hour   Intake --   Output 700 ml   Net -700 ml       Admission Weight  Weight: 64.4 kg (142 lb) (05/13/25 1810)    Daily Weight  05/13/25 : 64.4 kg (142 lb)    Image Results  CT lumbar spine wo IV contrast  Narrative: Interpreted By:  Jack Abdi,   STUDY:  CT LUMBAR SPINE WO IV CONTRAST;  5/13/2025 7:09 pm      INDICATION:  Signs/Symptoms:back pain.          COMPARISON:  None.      ACCESSION NUMBER(S):  [id number]      ORDERING CLINICIAN:  CAMRON SCHULTZ      TECHNIQUE:  Axial noncontrast images of the lumbar spine with coronal and  sagittal reconstructed images.      FINDINGS:  VISUALIZED ABDOMEN: There are numerous nonobstructing bilateral renal  calculi measuring up to 0.8 cm in the right kidney and 0.6 cm the  left kidney. There is new 0.4 cm calculus in the distal right ureter  prominent intrahepatic and extrahepatic bile ducts are likely  secondary to post-cholecystectomy status. Moderate aortic  atherosclerosis without AAA. There is colonic diverticulosis.      PARASPINAL SOFT TISSUES: No significant abnormality.      ALIGNMENT: Grade 1 retrolisthesis of L3 on L4 and anterolisthesis of  L5 on S1 (unchanged). No traumatic spondylolisthesis or traumatic  facet widening.      POSTOPERATIVE CHANGES: Redemonstrated L4-S1 posterior fusion and  L4-L5 and L5 S S1  discectomies. There was solid ankylosis across the  anterior and posterior elements. The hardware is intact without  periprosthetic lucency or fracture.      VERTEBRAE: Not repeated bone. 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  There is ossification in the interspinous spaces at L4-L5 and L5-S1  similar to prior study. No acute fractures. Vertebral body heights  are maintained.      SPINAL CANAL/INTERVERTEBRAL DISCS: There is no high-grade canal  stenosis. There are disc spur complexes at L2-L3 and L3-L4 which  contribute to mild central canal stenosis in conjunction with  ligamentum flavum thickening and which appears similar to 12/18/2023.      NEURAL FORAMINA: There is multilevel hypertrophic facet arthropathy  of mild degree at L1-L2 and L2-L3, moderate at L3-L4. This  contributes to varying degrees of neural foraminal stenosis in  conjunction with disc disease and is notably most advanced at L3-L4  with moderate bilateral neural foraminal stenosis, also similar to  prior study.      SACROILIAC JOINTS: Intact.      Impression: New 0.4 cm calculus in the distal right ureter without  hydronephrosis. There are numerous nonobstructing bilateral renal  calculi that remain.      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      Status post L4-S1 posterior fusion without evidence of hardware  complication.      MACRO:  None.      Signed by: Jack Abdi 5/13/2025 7:34 PM  Dictation workstation:   [id number]      Physical Exam  Constitutional:       Appearance: Normal appearance.      Comments: Pleasant elderly male resting in bed in discomfort   HENT:      Head: Normocephalic and atraumatic.      Nose: Nose normal.      Mouth/Throat:      Mouth: Mucous membranes are  moist.      Pharynx: Oropharynx is clear.   Eyes:      Extraocular Movements: Extraocular movements intact.      Conjunctiva/sclera: Conjunctivae normal.      Pupils: Pupils are equal, round, and reactive to light.   Cardiovascular:      Rate and Rhythm: Normal rate and regular rhythm.      Pulses: Normal pulses.      Heart sounds: Normal heart sounds.   Pulmonary:      Effort: Pulmonary effort is normal.      Breath sounds: Normal breath sounds.   Abdominal:      General: Abdomen is flat. Bowel sounds are normal.      Palpations: Abdomen is soft.   Musculoskeletal:         General: Normal range of motion.      Cervical back: Normal range of motion and neck supple.      Comments: Right paraspinal lumbar tenderness, limited ROM due to pain. NV intact in lower extremities.   Skin:     General: Skin is warm and dry.      Capillary Refill: Capillary refill takes less than 2 seconds.   Neurological:      General: No focal deficit present.      Mental Status: He is alert and oriented to person, place, and time.   Psychiatric:         Mood and Affect: Mood normal.         Behavior: Behavior normal.         Thought Content: Thought content normal.         Judgment: Judgment normal.      Relevant Results                              Assessment & Plan  Lumbar strain, initial encounter    Kidney stone    89 year old male who presented to ED today with acute exacerbation of back pain and unable to ambulate. PT/OT/SW ordered. Patient will be hospitalized for further medical management.      Lumbar strain  Ambulatory dysfunction  Hx of chronic back pain in pain management  Admit to OBS/telemety to Dr. Moore   Defer consults to attending per request  See imaging results   Analgesics/Zofran PRN   PT/OT/SW  Repeat labs in AM      Pancytopenia, chronic  WBC 3.8, H&H 11.8/35.1, platelets 144  Continue to monitor     Chronic conditions   #HTN, BPH, kidney stones, basal cell carcinoma  Continue home medications as appropriate when  nursing completes home med rec   Full code      DVT PPX   Ambulation   SCD's as tolerated      5/16: Transition to PO medication for pain control. Add lidoderm patches as well. Anticipate discharge home with [facility] this weekend.    5/17: Discontinue IV steroids as this may be making patient confused.  Add Seroquel.  Continue p.o. pain medicine.  Anticipate discharge home tomorrow.           Erik Castillo, DO

## 2025-05-18 NOTE — CARE PLAN
The patient's goals for the shift include no pain  able to move around    The clinical goals for the shift include comfort/safety    Over the shift, the patient did not make progress toward the following goals. Barriers to progression include   . Recommendations to address these barriers include   .

## 2025-05-19 ENCOUNTER — DOCUMENTATION (OUTPATIENT)
Dept: HOME HEALTH SERVICES | Facility: HOME HEALTH | Age: 89
End: 2025-05-19

## 2025-05-19 ENCOUNTER — OFFICE VISIT (OUTPATIENT)
Dept: PAIN MEDICINE | Facility: CLINIC | Age: 89
End: 2025-05-19
Payer: MEDICARE

## 2025-05-19 VITALS
HEART RATE: 87 BPM | RESPIRATION RATE: 18 BRPM | BODY MASS INDEX: 20.3 KG/M2 | TEMPERATURE: 97 F | SYSTOLIC BLOOD PRESSURE: 119 MMHG | OXYGEN SATURATION: 95 % | HEIGHT: 71 IN | DIASTOLIC BLOOD PRESSURE: 56 MMHG | WEIGHT: 145 LBS

## 2025-05-19 DIAGNOSIS — M96.1 POSTLAMINECTOMY SYNDROME OF LUMBAR REGION: ICD-10-CM

## 2025-05-19 DIAGNOSIS — M54.17 LUMBOSACRAL RADICULOPATHY: Primary | ICD-10-CM

## 2025-05-19 LAB — HOLD SPECIMEN: 293

## 2025-05-19 PROCEDURE — 1159F MED LIST DOCD IN RCRD: CPT | Performed by: ANESTHESIOLOGY

## 2025-05-19 PROCEDURE — 1125F AMNT PAIN NOTED PAIN PRSNT: CPT | Performed by: ANESTHESIOLOGY

## 2025-05-19 PROCEDURE — 99214 OFFICE O/P EST MOD 30 MIN: CPT | Performed by: ANESTHESIOLOGY

## 2025-05-19 PROCEDURE — 1111F DSCHRG MED/CURRENT MED MERGE: CPT | Performed by: ANESTHESIOLOGY

## 2025-05-19 ASSESSMENT — COLUMBIA-SUICIDE SEVERITY RATING SCALE - C-SSRS
2. HAVE YOU ACTUALLY HAD ANY THOUGHTS OF KILLING YOURSELF?: NO
1. IN THE PAST MONTH, HAVE YOU WISHED YOU WERE DEAD OR WISHED YOU COULD GO TO SLEEP AND NOT WAKE UP?: NO
6. HAVE YOU EVER DONE ANYTHING, STARTED TO DO ANYTHING, OR PREPARED TO DO ANYTHING TO END YOUR LIFE?: NO

## 2025-05-19 ASSESSMENT — ENCOUNTER SYMPTOMS
SHORTNESS OF BREATH: 0
LOSS OF SENSATION IN FEET: 1
FEVER: 0
OCCASIONAL FEELINGS OF UNSTEADINESS: 1
BACK PAIN: 1

## 2025-05-19 ASSESSMENT — PAIN - FUNCTIONAL ASSESSMENT: PAIN_FUNCTIONAL_ASSESSMENT: 0-10

## 2025-05-19 ASSESSMENT — PATIENT HEALTH QUESTIONNAIRE - PHQ9
1. LITTLE INTEREST OR PLEASURE IN DOING THINGS: NOT AT ALL
SUM OF ALL RESPONSES TO PHQ9 QUESTIONS 1 AND 2: 0
2. FEELING DOWN, DEPRESSED OR HOPELESS: NOT AT ALL

## 2025-05-19 ASSESSMENT — PAIN SCALES - GENERAL
PAINLEVEL_OUTOF10: 8
PAINLEVEL_OUTOF10: 8

## 2025-05-19 ASSESSMENT — PAIN DESCRIPTION - DESCRIPTORS: DESCRIPTORS: ACHING

## 2025-05-19 NOTE — HH CARE COORDINATION
Home Care received a referral for Nursing, Physical Therapy, Occupational Therapy, and Home Health Aide. Unfortunately, we are unable to accept and process the referral at this time.    Reason:  Internal referral received in error - Referral was intended for alternate agency or alternate services([facility])    Patients, please reach out to the referring provider or your PCP to assist in obtaining an alternative home care agency and/or guidance to meet your needs.    Providers, please reach out to  Home Care at [phone] with any questions regarding the declined referral.

## 2025-05-19 NOTE — PROGRESS NOTES
05/19/25 1154   Discharge Planning   Living Arrangements Spouse/significant other   Support Systems Children   Expected Discharge Disposition Home H   Does the patient need discharge transport arranged? No     Patient discharged.  Home care orders attached to [facility] referral; [facility] aware patient is home.  Candice SANDERS TCC

## 2025-05-19 NOTE — PROGRESS NOTES
Chief Complain    Follow-up (Was in the hospital last week was discharged yesterday. Is having back pain. Would like to discuss injections.)    History Of Present Illness  Colton Jiménez is a 89 y.o. male here for follow-up of right sided low back pain, radiating to right buttock and lateral hip pain. The patient has been experiencing these symptoms for last few  month(s). The patient describes the pain as aching. The patient's current pain score is 7-8 on a scale from 0-10. The pain is worsened by trying to get up from and lying down  and is alleviated by nothing relieves the pain. Since the last visit the pain has worsened.    Previous procedures:  Right S1 transforaminal on 12/13/2024: 50% relief of pain  Right S1 transforaminal on March 5th, 2025: 50% relief of pain    Past Medical History  He has a past medical history of Personal history of malignant neoplasm, unspecified, Personal history of other diseases of the circulatory system, Personal history of other diseases of the digestive system, Personal history of other diseases of the digestive system, Personal history of other diseases of the musculoskeletal system and connective tissue, Personal history of other diseases of the musculoskeletal system and connective tissue, Personal history of other specified conditions, and Personal history of other specified conditions.    Surgical History  He has a past surgical history that includes Other surgical history (09/11/2017); Colonoscopy (09/11/2017); Cholecystectomy (09/11/2017); Cataract extraction (09/11/2017); MR angio neck wo IV contrast (12/19/2023); MR angio head wo IV contrast (12/19/2023); and Lumbar fusion.    Social History  He reports that he has never smoked. He has never been exposed to tobacco smoke. He has never used smokeless tobacco. He reports current alcohol use. He reports that he does not use drugs.    Family History  Non contributory     Allergies  Patient has no known allergies.    Review of  "Systems  Review of Systems   Constitutional:  Negative for fever.   Respiratory:  Negative for shortness of breath.    Cardiovascular:  Negative for chest pain.   Musculoskeletal:  Positive for back pain.   Psychiatric/Behavioral:  Negative for suicidal ideas.         Physical Exam  Physical Exam  Constitutional:       Appearance: Normal appearance.   HENT:      Head: Normocephalic and atraumatic.   Eyes:      Pupils: Pupils are equal, round, and reactive to light.   Pulmonary:      Effort: Pulmonary effort is normal.   Neurological:      Mental Status: He is alert and oriented to person, place, and time.   Psychiatric:         Behavior: Behavior normal.           Last Recorded Vitals  Blood pressure 119/56, pulse 87, temperature 36.1 °C (97 °F), resp. rate 18, height 1.803 m (5' 11\"), weight 65.8 kg (145 lb), SpO2 95%.    Reviewed Images  Reviewed and independently interpreted CT of lumbar spine done on 5/13/2025 reveals no acute injuries stable fusion L5-S1.    Reviewed Labs   Latest Reference Range & Units 05/16/25 06:14   GLUCOSE 74 - 99 mg/dL 104 (H)   SODIUM 136 - 145 mmol/L 140   POTASSIUM 3.5 - 5.3 mmol/L 4.1   CHLORIDE 98 - 107 mmol/L 106   Bicarbonate 21 - 32 mmol/L 28   Anion Gap 10 - 20 mmol/L 10   Blood Urea Nitrogen 6 - 23 mg/dL 23   Creatinine 0.50 - 1.30 mg/dL 0.56   EGFR >60 mL/min/1.73m*2 >90   Calcium 8.6 - 10.3 mg/dL 8.7   Albumin 3.4 - 5.0 g/dL 3.1 (L)   Alkaline Phosphatase 33 - 136 U/L 53   ALT 10 - 52 U/L 21   AST 9 - 39 U/L 17   Bilirubin Total 0.0 - 1.2 mg/dL 0.9   (H): Data is abnormally high  (L): Data is abnormally low   Latest Reference Range & Units 05/16/25 06:14   WBC 4.4 - 11.3 x10*3/uL 6.3   nRBC 0.0 - 0.0 /100 WBCs 0.0   RBC 4.50 - 5.90 x10*6/uL 3.53 (L)   HEMOGLOBIN 13.5 - 17.5 g/dL 11.1 (L)   HEMATOCRIT 41.0 - 52.0 % 34.2 (L)   MCV 80 - 100 fL 97   MCH 26.0 - 34.0 pg 31.4   MCHC 32.0 - 36.0 g/dL 32.5   RED CELL DISTRIBUTION WIDTH 11.5 - 14.5 % 13.7   Platelets 150 - 450 x10*3/uL " 144 (L)   (L): Data is abnormally low     Assessment/Plan   Encounter Diagnoses   Name Primary?    Lumbosacral radiculopathy Yes    Postlaminectomy syndrome of lumbar region             Colton Jiménez is a 89 y.o. male here for evaluation of right-sided low back pain rating to right buttock lateral hip it was also going down to his right lower extremity to his leg, currently staying mostly to the lateral hip and thigh.  Past medical history significant of lumbar fusion between L4-S1 done 20 years ago.  Has had 2 right S1 transforaminal injection which provided him with around 50% relief of pain lasting for few weeks only.  Since the last visit he had an acute flareup of his lumbar radicular pain.  He was seen in the ER and hospitalized.  Reviewed the CT scan done did not reveal any acute changes or compression fractures.  Would trial him on a caudal epidural steroid injection to see if he can get longer-term benefit with that compared to the transforaminal as he had previously.  Discussed risk benefits and alternatives.        Naman Shanks MD

## 2025-05-23 NOTE — DISCHARGE SUMMARY
Discharge Diagnosis  Lumbar strain, initial encounter           Issues Requiring Follow-Up  LBP    Discharge Meds     Medication List      START taking these medications     acetaminophen 325 mg tablet; Commonly known as: Tylenol; Take 3 tablets   (975 mg) by mouth every 8 hours.   cyclobenzaprine 10 mg tablet; Commonly known as: Flexeril; Take 1 tablet   (10 mg) by mouth 3 times a day as needed for muscle spasms.   lidocaine 4 % patch; Place 2 patches over 12 hours on the skin once   daily. Remove & discard patch within 12 hours or as directed by MD.   oxyCODONE 5 mg immediate release tablet; Commonly known as: Roxicodone;   Take 1 tablet (5 mg) by mouth every 6 hours if needed for severe pain (7 -   10).   pantoprazole 40 mg EC tablet; Commonly known as: ProtoNix; Take 1 tablet   (40 mg) by mouth once daily in the morning. Take before meals. Do not   crush, chew, or split.   polyethylene glycol 17 gram packet; Commonly known as: Glycolax,   Miralax; Take 17 g by mouth once daily.   traMADol 50 mg tablet; Commonly known as: Ultram; Take 1 tablet (50 mg)   by mouth every 8 hours if needed for moderate pain (4 - 6).     CONTINUE taking these medications     amLODIPine 2.5 mg tablet; Commonly known as: Norvasc   aspirin 81 mg EC tablet   atorvastatin 20 mg tablet; Commonly known as: Lipitor   finasteride 5 mg tablet; Commonly known as: Proscar   sertraline 100 mg tablet; Commonly known as: Zoloft       Test Results Pending At Discharge  Pending Labs       No current pending labs.            Hospital Course   89 year old male who presented to ED today with acute exacerbation of back pain and unable to ambulate. PT/OT/SW ordered. Patient will be hospitalized for further medical management.      Lumbar strain  Ambulatory dysfunction  Hx of chronic back pain in pain management  Admit to OBS/telemety to Dr. Moore   Defer consults to attending per request  See imaging results   Analgesics/Zofran PRN   PT/OT/SW  Repeat labs  in AM      Pancytopenia, chronic  WBC 3.8, H&H 11.8/35.1, platelets 144  Continue to monitor     Chronic conditions   #HTN, BPH, kidney stones, basal cell carcinoma  Continue home medications as appropriate when nursing completes home med rec   Full code      DVT PPX   Ambulation   SCD's as tolerated      5/16: Transition to PO medication for pain control. Add lidoderm patches as well. Anticipate discharge home with [facility] this weekend.     5/17: Discontinue IV steroids as this may be making patient confused.  Add Seroquel.  Continue p.o. pain medicine.  Anticipate discharge home tomorrow.       Pertinent Physical Exam At Time of Discharge  Physical Exam  Constitutional:       Appearance: Normal appearance.   HENT:      Head: Normocephalic and atraumatic.      Nose: Nose normal.      Mouth/Throat:      Mouth: Mucous membranes are moist.      Pharynx: Oropharynx is clear.   Eyes:      Extraocular Movements: Extraocular movements intact.      Pupils: Pupils are equal, round, and reactive to light.   Cardiovascular:      Rate and Rhythm: Normal rate and regular rhythm.   Pulmonary:      Effort: No respiratory distress.      Breath sounds: Normal breath sounds. No wheezing, rhonchi or rales.   Abdominal:      General: Bowel sounds are normal. There is no distension.      Palpations: Abdomen is soft.      Tenderness: There is no abdominal tenderness. There is no guarding.   Musculoskeletal:      Right lower leg: No edema.      Left lower leg: No edema.   Skin:     General: Skin is warm and dry.   Neurological:      Mental Status: He is alert and oriented to person, place, and time. Mental status is at baseline.   Psychiatric:         Mood and Affect: Mood normal.         Behavior: Behavior normal.         Outpatient Follow-Up  Future Appointments   Date Time Provider Department Center   5/28/2025  3:00 PM PAR OPCTR PAINMGMT PROCEDURE ROOM [facility]         Erik Castillo DO

## 2025-05-28 ENCOUNTER — HOSPITAL ENCOUNTER (OUTPATIENT)
Dept: PAIN MEDICINE | Facility: CLINIC | Age: 89
Discharge: HOME | End: 2025-05-28
Payer: MEDICARE

## 2025-05-28 VITALS
RESPIRATION RATE: 18 BRPM | TEMPERATURE: 97.9 F | SYSTOLIC BLOOD PRESSURE: 157 MMHG | HEART RATE: 101 BPM | OXYGEN SATURATION: 97 % | DIASTOLIC BLOOD PRESSURE: 77 MMHG

## 2025-05-28 DIAGNOSIS — M54.17 LUMBOSACRAL RADICULOPATHY: ICD-10-CM

## 2025-05-28 PROCEDURE — 62323 NJX INTERLAMINAR LMBR/SAC: CPT | Performed by: ANESTHESIOLOGY

## 2025-05-28 PROCEDURE — 2500000004 HC RX 250 GENERAL PHARMACY W/ HCPCS (ALT 636 FOR OP/ED): Mod: JZ | Performed by: ANESTHESIOLOGY

## 2025-05-28 PROCEDURE — 2500000005 HC RX 250 GENERAL PHARMACY W/O HCPCS: Performed by: ANESTHESIOLOGY

## 2025-05-28 PROCEDURE — 2550000001 HC RX 255 CONTRASTS: Performed by: ANESTHESIOLOGY

## 2025-05-28 RX ORDER — METHYLPREDNISOLONE ACETATE 40 MG/ML
INJECTION, SUSPENSION INTRA-ARTICULAR; INTRALESIONAL; INTRAMUSCULAR; SOFT TISSUE AS NEEDED
Status: COMPLETED | OUTPATIENT
Start: 2025-05-28 | End: 2025-05-28

## 2025-05-28 RX ORDER — LIDOCAINE HYDROCHLORIDE 10 MG/ML
INJECTION, SOLUTION EPIDURAL; INFILTRATION; INTRACAUDAL; PERINEURAL AS NEEDED
Status: COMPLETED | OUTPATIENT
Start: 2025-05-28 | End: 2025-05-28

## 2025-05-28 RX ORDER — SODIUM CHLORIDE 9 MG/ML
INJECTION, SOLUTION INTRAMUSCULAR; INTRAVENOUS; SUBCUTANEOUS AS NEEDED
Status: COMPLETED | OUTPATIENT
Start: 2025-05-28 | End: 2025-05-28

## 2025-05-28 RX ORDER — DEXAMETHASONE SODIUM PHOSPHATE 10 MG/ML
INJECTION INTRAMUSCULAR; INTRAVENOUS AS NEEDED
Status: COMPLETED | OUTPATIENT
Start: 2025-05-28 | End: 2025-05-28

## 2025-05-28 RX ADMIN — IOHEXOL 3 ML: 300 INJECTION, SOLUTION INTRAVENOUS at 15:22

## 2025-05-28 RX ADMIN — SODIUM CHLORIDE 10 ML: 9 INJECTION, SOLUTION INTRAMUSCULAR; INTRAVENOUS; SUBCUTANEOUS at 15:22

## 2025-05-28 RX ADMIN — LIDOCAINE HYDROCHLORIDE 10 ML: 10 INJECTION, SOLUTION EPIDURAL; INFILTRATION; INTRACAUDAL; PERINEURAL at 15:22

## 2025-05-28 RX ADMIN — DEXAMETHASONE SODIUM PHOSPHATE 10 MG: 10 INJECTION, SOLUTION INTRAMUSCULAR; INTRAVENOUS at 15:27

## 2025-05-28 ASSESSMENT — PAIN - FUNCTIONAL ASSESSMENT
PAIN_FUNCTIONAL_ASSESSMENT: 0-10
PAIN_FUNCTIONAL_ASSESSMENT: 0-10

## 2025-05-28 ASSESSMENT — PAIN DESCRIPTION - DESCRIPTORS: DESCRIPTORS: ACHING

## 2025-05-28 ASSESSMENT — PATIENT HEALTH QUESTIONNAIRE - PHQ9
SUM OF ALL RESPONSES TO PHQ9 QUESTIONS 1 AND 2: 0
2. FEELING DOWN, DEPRESSED OR HOPELESS: NOT AT ALL
1. LITTLE INTEREST OR PLEASURE IN DOING THINGS: NOT AT ALL

## 2025-05-28 ASSESSMENT — PAIN SCALES - GENERAL
PAINLEVEL_OUTOF10: 4
PAINLEVEL_OUTOF10: 0 - NO PAIN
